# Patient Record
Sex: MALE | Race: WHITE | Employment: OTHER | URBAN - NONMETROPOLITAN AREA
[De-identification: names, ages, dates, MRNs, and addresses within clinical notes are randomized per-mention and may not be internally consistent; named-entity substitution may affect disease eponyms.]

---

## 2017-02-01 RX ORDER — ALPRAZOLAM 0.25 MG/1
0.25 TABLET ORAL DAILY
Qty: 90 TABLET | Refills: 0 | Status: SHIPPED | OUTPATIENT
Start: 2017-02-01 | End: 2017-05-25 | Stop reason: SDUPTHER

## 2017-03-07 ENCOUNTER — PROCEDURE VISIT (OUTPATIENT)
Dept: UROLOGY | Age: 72
End: 2017-03-07

## 2017-03-07 VITALS — BODY MASS INDEX: 31.42 KG/M2 | HEIGHT: 72 IN | WEIGHT: 232 LBS

## 2017-03-07 DIAGNOSIS — R35.0 FREQUENCY OF URINATION: ICD-10-CM

## 2017-03-07 DIAGNOSIS — N40.1 BENIGN PROSTATIC HYPERPLASIA WITH LOWER URINARY TRACT SYMPTOMS, UNSPECIFIED MORPHOLOGY: ICD-10-CM

## 2017-03-07 DIAGNOSIS — C67.0 MALIGNANT NEOPLASM OF TRIGONE OF URINARY BLADDER (HCC): ICD-10-CM

## 2017-03-07 DIAGNOSIS — C67.4 MALIGNANT NEOPLASM OF POSTERIOR WALL OF URINARY BLADDER (HCC): Primary | ICD-10-CM

## 2017-03-07 LAB
BILIRUBIN URINE: NEGATIVE
BLOOD URINE, POC: NORMAL
CHARACTER, URINE: CLEAR
COLOR, URINE: YELLOW
GLUCOSE URINE: NEGATIVE MG/DL
KETONES, URINE: NEGATIVE
LEUKOCYTE CLUMPS, URINE: NEGATIVE
NITRITE, URINE: NEGATIVE
PH, URINE: 6.5
PROTEIN, URINE: NEGATIVE MG/DL
SPECIFIC GRAVITY, URINE: 1.01 (ref 1–1.03)
UROBILINOGEN, URINE: 0.2 EU/DL

## 2017-03-07 PROCEDURE — 51741 ELECTRO-UROFLOWMETRY FIRST: CPT | Performed by: UROLOGY

## 2017-03-07 PROCEDURE — 99999 PR OFFICE/OUTPT VISIT,PROCEDURE ONLY: CPT | Performed by: UROLOGY

## 2017-03-07 PROCEDURE — 81003 URINALYSIS AUTO W/O SCOPE: CPT | Performed by: UROLOGY

## 2017-03-07 PROCEDURE — 52000 CYSTOURETHROSCOPY: CPT | Performed by: UROLOGY

## 2017-03-07 PROCEDURE — 51798 US URINE CAPACITY MEASURE: CPT | Performed by: UROLOGY

## 2017-03-07 ASSESSMENT — ENCOUNTER SYMPTOMS
EYE PAIN: 0
EYE ITCHING: 0
BACK PAIN: 0
VOMITING: 0
FACIAL SWELLING: 0
CONSTIPATION: 0
COLOR CHANGE: 0
SHORTNESS OF BREATH: 0
CHEST TIGHTNESS: 0

## 2017-03-09 ENCOUNTER — OFFICE VISIT (OUTPATIENT)
Dept: FAMILY MEDICINE CLINIC | Age: 72
End: 2017-03-09

## 2017-03-09 VITALS
DIASTOLIC BLOOD PRESSURE: 66 MMHG | HEART RATE: 60 BPM | WEIGHT: 240 LBS | BODY MASS INDEX: 32.55 KG/M2 | SYSTOLIC BLOOD PRESSURE: 100 MMHG | RESPIRATION RATE: 20 BRPM | TEMPERATURE: 97.9 F

## 2017-03-09 DIAGNOSIS — M77.8 SHOULDER TENDONITIS, LEFT: Primary | ICD-10-CM

## 2017-03-09 DIAGNOSIS — I10 ESSENTIAL HYPERTENSION, BENIGN: ICD-10-CM

## 2017-03-09 PROCEDURE — 96372 THER/PROPH/DIAG INJ SC/IM: CPT | Performed by: FAMILY MEDICINE

## 2017-03-09 PROCEDURE — 99213 OFFICE O/P EST LOW 20 MIN: CPT | Performed by: FAMILY MEDICINE

## 2017-03-09 RX ORDER — METHYLPREDNISOLONE ACETATE 80 MG/ML
160 INJECTION, SUSPENSION INTRA-ARTICULAR; INTRALESIONAL; INTRAMUSCULAR; SOFT TISSUE ONCE
Status: COMPLETED | OUTPATIENT
Start: 2017-03-09 | End: 2017-03-09

## 2017-03-09 RX ORDER — LISINOPRIL 10 MG/1
10 TABLET ORAL DAILY
Qty: 90 TABLET | Refills: 3 | Status: SHIPPED | OUTPATIENT
Start: 2017-03-09 | End: 2018-03-15 | Stop reason: SDUPTHER

## 2017-03-09 RX ORDER — HYOSCYAMINE SULFATE 0.125 MG
0.12 TABLET,DISINTEGRATING ORAL EVERY 4 HOURS PRN
Qty: 360 TABLET | Refills: 3 | Status: SHIPPED | OUTPATIENT
Start: 2017-03-09 | End: 2017-06-13 | Stop reason: SDUPTHER

## 2017-03-09 RX ADMIN — METHYLPREDNISOLONE ACETATE 160 MG: 80 INJECTION, SUSPENSION INTRA-ARTICULAR; INTRALESIONAL; INTRAMUSCULAR; SOFT TISSUE at 14:50

## 2017-03-09 ASSESSMENT — ENCOUNTER SYMPTOMS
DIARRHEA: 0
RHINORRHEA: 0
SHORTNESS OF BREATH: 0
NAUSEA: 0
COUGH: 0
SINUS PRESSURE: 0
CONSTIPATION: 0
ABDOMINAL PAIN: 0
ABDOMINAL DISTENTION: 0
EYE PAIN: 0
SORE THROAT: 0

## 2017-05-26 RX ORDER — ALPRAZOLAM 0.25 MG/1
0.25 TABLET ORAL DAILY
Qty: 30 TABLET | Refills: 0 | Status: SHIPPED | OUTPATIENT
Start: 2017-05-26 | End: 2017-08-02 | Stop reason: SDUPTHER

## 2017-06-13 ENCOUNTER — OFFICE VISIT (OUTPATIENT)
Dept: FAMILY MEDICINE CLINIC | Age: 72
End: 2017-06-13

## 2017-06-13 VITALS
RESPIRATION RATE: 16 BRPM | TEMPERATURE: 97.9 F | HEART RATE: 56 BPM | BODY MASS INDEX: 32.96 KG/M2 | SYSTOLIC BLOOD PRESSURE: 104 MMHG | WEIGHT: 243 LBS | DIASTOLIC BLOOD PRESSURE: 64 MMHG

## 2017-06-13 DIAGNOSIS — R35.1 NOCTURIA: ICD-10-CM

## 2017-06-13 DIAGNOSIS — M75.52 SHOULDER BURSITIS, LEFT: Primary | ICD-10-CM

## 2017-06-13 DIAGNOSIS — R04.2 HEMOPTYSIS: ICD-10-CM

## 2017-06-13 DIAGNOSIS — L82.1 SEBORRHEIC KERATOSES: ICD-10-CM

## 2017-06-13 DIAGNOSIS — L30.9 DERMATITIS: ICD-10-CM

## 2017-06-13 PROCEDURE — 96372 THER/PROPH/DIAG INJ SC/IM: CPT | Performed by: FAMILY MEDICINE

## 2017-06-13 PROCEDURE — 99214 OFFICE O/P EST MOD 30 MIN: CPT | Performed by: FAMILY MEDICINE

## 2017-06-13 RX ORDER — HYOSCYAMINE SULFATE 0.125 MG
0.12 TABLET,DISINTEGRATING ORAL EVERY 4 HOURS PRN
Qty: 360 TABLET | Refills: 3 | Status: SHIPPED | OUTPATIENT
Start: 2017-06-13 | End: 2018-12-20 | Stop reason: SDUPTHER

## 2017-06-13 RX ORDER — GLUCOSAMINE HCL/CHONDROITIN SU 500-400 MG
CAPSULE ORAL
Qty: 50 STRIP | Refills: 3 | Status: SHIPPED | OUTPATIENT
Start: 2017-06-13 | End: 2020-10-05 | Stop reason: SDUPTHER

## 2017-06-13 RX ORDER — METHYLPREDNISOLONE ACETATE 80 MG/ML
160 INJECTION, SUSPENSION INTRA-ARTICULAR; INTRALESIONAL; INTRAMUSCULAR; SOFT TISSUE ONCE
Status: COMPLETED | OUTPATIENT
Start: 2017-06-13 | End: 2017-06-13

## 2017-06-13 RX ORDER — GLUCOSAMINE HCL/CHONDROITIN SU 500-400 MG
CAPSULE ORAL
Qty: 100 STRIP | Refills: 3 | Status: CANCELLED | OUTPATIENT
Start: 2017-06-13

## 2017-06-13 RX ORDER — NITROGLYCERIN 0.4 MG/1
0.4 TABLET SUBLINGUAL EVERY 5 MIN PRN
Qty: 25 TABLET | Refills: 5 | Status: SHIPPED | OUTPATIENT
Start: 2017-06-13

## 2017-06-13 RX ADMIN — METHYLPREDNISOLONE ACETATE 160 MG: 80 INJECTION, SUSPENSION INTRA-ARTICULAR; INTRALESIONAL; INTRAMUSCULAR; SOFT TISSUE at 14:34

## 2017-06-16 LAB — PSA, ULTRASENSITIVE: 0.84 NG/ML

## 2017-06-16 ASSESSMENT — ENCOUNTER SYMPTOMS
CONSTIPATION: 0
NAUSEA: 0
ABDOMINAL PAIN: 0
COUGH: 0
SORE THROAT: 0
RHINORRHEA: 0
SINUS PRESSURE: 0
DIARRHEA: 0
SHORTNESS OF BREATH: 0
EYE PAIN: 0
ABDOMINAL DISTENTION: 0

## 2017-07-18 LAB
CHOLESTEROL, TOTAL: 139 MG/DL
CHOLESTEROL/HDL RATIO: NORMAL
HDLC SERPL-MCNC: 65 MG/DL (ref 35–70)
LDL CHOLESTEROL CALCULATED: 58 MG/DL (ref 0–160)
TRIGL SERPL-MCNC: 82 MG/DL
VLDLC SERPL CALC-MCNC: NORMAL MG/DL

## 2017-08-02 RX ORDER — ALPRAZOLAM 0.25 MG/1
0.25 TABLET ORAL DAILY
Qty: 30 TABLET | Refills: 0 | Status: SHIPPED | OUTPATIENT
Start: 2017-08-02 | End: 2017-09-14 | Stop reason: SDUPTHER

## 2017-08-03 ENCOUNTER — HOSPITAL ENCOUNTER (OUTPATIENT)
Dept: GENERAL RADIOLOGY | Age: 72
Discharge: HOME | End: 2017-08-03
Payer: MEDICARE

## 2017-08-03 ENCOUNTER — HOSPITAL ENCOUNTER (OUTPATIENT)
Age: 72
Discharge: HOME | End: 2017-08-03
Payer: MEDICARE

## 2017-08-03 DIAGNOSIS — I20.9 ANGINAL PAIN (HCC): ICD-10-CM

## 2017-08-03 LAB
BASOPHILS # BLD: 0.6 %
BASOPHILS ABSOLUTE: 0 THOU/MM3 (ref 0–0.1)
EOSINOPHIL # BLD: 1.5 %
EOSINOPHILS ABSOLUTE: 0.1 THOU/MM3 (ref 0–0.4)
HCT VFR BLD CALC: 39.5 % (ref 42–52)
HEMOGLOBIN: 13.9 GM/DL (ref 14–18)
LYMPHOCYTES # BLD: 20.9 %
LYMPHOCYTES ABSOLUTE: 1.6 THOU/MM3 (ref 1–4.8)
MCH RBC QN AUTO: 32.1 PG (ref 27–31)
MCHC RBC AUTO-ENTMCNC: 35.3 GM/DL (ref 33–37)
MCV RBC AUTO: 91.2 FL (ref 80–94)
MONOCYTES # BLD: 9.1 %
MONOCYTES ABSOLUTE: 0.7 THOU/MM3 (ref 0.4–1.3)
NUCLEATED RED BLOOD CELLS: 0 /100 WBC
PDW BLD-RTO: 13.7 % (ref 11.5–14.5)
PLATELET # BLD: 188 THOU/MM3 (ref 130–400)
PMV BLD AUTO: 9.1 MCM (ref 7.4–10.4)
RBC # BLD: 4.33 MILL/MM3 (ref 4.7–6.1)
RBC # BLD: NORMAL 10*6/UL
SEG NEUTROPHILS: 67.9 %
SEGMENTED NEUTROPHILS ABSOLUTE COUNT: 5.3 THOU/MM3 (ref 1.8–7.7)
TSH SERPL DL<=0.05 MIU/L-ACNC: 0.81 UIU/ML (ref 0.4–4.2)
WBC # BLD: 7.8 THOU/MM3 (ref 4.8–10.8)

## 2017-08-03 PROCEDURE — 36415 COLL VENOUS BLD VENIPUNCTURE: CPT

## 2017-08-03 PROCEDURE — 85025 COMPLETE CBC W/AUTO DIFF WBC: CPT

## 2017-08-03 PROCEDURE — 71020 XR CHEST STANDARD TWO VW: CPT

## 2017-08-03 PROCEDURE — 84443 ASSAY THYROID STIM HORMONE: CPT

## 2017-09-07 ENCOUNTER — PROCEDURE VISIT (OUTPATIENT)
Dept: UROLOGY | Age: 72
End: 2017-09-07
Payer: MEDICARE

## 2017-09-07 VITALS
DIASTOLIC BLOOD PRESSURE: 82 MMHG | BODY MASS INDEX: 31.69 KG/M2 | HEIGHT: 72 IN | SYSTOLIC BLOOD PRESSURE: 132 MMHG | WEIGHT: 234 LBS

## 2017-09-07 DIAGNOSIS — C67.0 MALIGNANT NEOPLASM OF TRIGONE OF URINARY BLADDER (HCC): ICD-10-CM

## 2017-09-07 DIAGNOSIS — R39.14 BENIGN PROSTATIC HYPERTROPHY (BPH) WITH INCOMPLETE BLADDER EMPTYING: Primary | ICD-10-CM

## 2017-09-07 DIAGNOSIS — N40.1 BENIGN PROSTATIC HYPERTROPHY (BPH) WITH INCOMPLETE BLADDER EMPTYING: Primary | ICD-10-CM

## 2017-09-07 LAB
BILIRUBIN URINE: NEGATIVE
BLOOD URINE, POC: NORMAL
CHARACTER, URINE: CLEAR
COLOR, URINE: YELLOW
GLUCOSE URINE: NEGATIVE MG/DL
KETONES, URINE: NEGATIVE
LEUKOCYTE CLUMPS, URINE: NORMAL
NITRITE, URINE: NEGATIVE
PH, URINE: 5.5
POST VOID RESIDUAL (PVR): 110 ML
PROTEIN, URINE: NEGATIVE MG/DL
SPECIFIC GRAVITY, URINE: 1.01 (ref 1–1.03)
UROBILINOGEN, URINE: 0.2 EU/DL

## 2017-09-07 PROCEDURE — 52000 CYSTOURETHROSCOPY: CPT | Performed by: UROLOGY

## 2017-09-07 PROCEDURE — 99999 PR OFFICE/OUTPT VISIT,PROCEDURE ONLY: CPT | Performed by: UROLOGY

## 2017-09-07 PROCEDURE — 81003 URINALYSIS AUTO W/O SCOPE: CPT | Performed by: UROLOGY

## 2017-09-07 PROCEDURE — 51798 US URINE CAPACITY MEASURE: CPT | Performed by: UROLOGY

## 2017-09-14 ENCOUNTER — OFFICE VISIT (OUTPATIENT)
Dept: FAMILY MEDICINE CLINIC | Age: 72
End: 2017-09-14
Payer: MEDICARE

## 2017-09-14 VITALS
HEIGHT: 70 IN | WEIGHT: 242 LBS | DIASTOLIC BLOOD PRESSURE: 60 MMHG | RESPIRATION RATE: 16 BRPM | HEART RATE: 72 BPM | TEMPERATURE: 97.7 F | BODY MASS INDEX: 34.65 KG/M2 | SYSTOLIC BLOOD PRESSURE: 106 MMHG

## 2017-09-14 DIAGNOSIS — Z85.51 HISTORY OF BLADDER CANCER: ICD-10-CM

## 2017-09-14 DIAGNOSIS — I10 ESSENTIAL HYPERTENSION, BENIGN: ICD-10-CM

## 2017-09-14 DIAGNOSIS — R73.9 HYPERGLYCEMIA: ICD-10-CM

## 2017-09-14 DIAGNOSIS — I25.10 CORONARY ARTERY DISEASE INVOLVING NATIVE HEART WITHOUT ANGINA PECTORIS, UNSPECIFIED VESSEL OR LESION TYPE: Primary | ICD-10-CM

## 2017-09-14 PROCEDURE — G0008 ADMIN INFLUENZA VIRUS VAC: HCPCS | Performed by: FAMILY MEDICINE

## 2017-09-14 PROCEDURE — 99213 OFFICE O/P EST LOW 20 MIN: CPT | Performed by: FAMILY MEDICINE

## 2017-09-14 PROCEDURE — 90662 IIV NO PRSV INCREASED AG IM: CPT | Performed by: FAMILY MEDICINE

## 2017-09-14 RX ORDER — ATORVASTATIN CALCIUM 40 MG/1
40 TABLET, FILM COATED ORAL NIGHTLY
Qty: 90 TABLET | Refills: 3 | Status: SHIPPED | OUTPATIENT
Start: 2017-09-14 | End: 2018-09-27 | Stop reason: SDUPTHER

## 2017-09-14 RX ORDER — ALPRAZOLAM 0.25 MG/1
0.25 TABLET ORAL DAILY
Qty: 30 TABLET | Refills: 2 | Status: SHIPPED | OUTPATIENT
Start: 2017-09-14 | End: 2017-12-14 | Stop reason: SDUPTHER

## 2017-09-14 RX ORDER — DICYCLOMINE HCL 20 MG
20 TABLET ORAL 2 TIMES DAILY
Qty: 180 TABLET | Refills: 3 | Status: SHIPPED | OUTPATIENT
Start: 2017-09-14 | End: 2018-09-27 | Stop reason: SDUPTHER

## 2017-09-14 RX ORDER — METOPROLOL TARTRATE 50 MG/1
25 TABLET, FILM COATED ORAL NIGHTLY
COMMUNITY
End: 2018-09-27 | Stop reason: DRUGHIGH

## 2017-09-17 ASSESSMENT — ENCOUNTER SYMPTOMS
DIARRHEA: 0
NAUSEA: 0
ABDOMINAL DISTENTION: 0
SINUS PRESSURE: 0
ABDOMINAL PAIN: 0
COUGH: 0
RHINORRHEA: 0
EYE PAIN: 0
SHORTNESS OF BREATH: 0
CONSTIPATION: 0
SORE THROAT: 0

## 2017-11-15 ENCOUNTER — OFFICE VISIT (OUTPATIENT)
Dept: PULMONOLOGY | Age: 72
End: 2017-11-15
Payer: MEDICARE

## 2017-11-15 VITALS
HEART RATE: 62 BPM | DIASTOLIC BLOOD PRESSURE: 66 MMHG | HEIGHT: 70 IN | BODY MASS INDEX: 35.05 KG/M2 | OXYGEN SATURATION: 92 % | SYSTOLIC BLOOD PRESSURE: 104 MMHG | WEIGHT: 244.8 LBS

## 2017-11-15 DIAGNOSIS — Z99.89 OBSTRUCTIVE SLEEP APNEA ON CPAP: Primary | ICD-10-CM

## 2017-11-15 DIAGNOSIS — G47.33 OBSTRUCTIVE SLEEP APNEA ON CPAP: Primary | ICD-10-CM

## 2017-11-15 PROCEDURE — G8484 FLU IMMUNIZE NO ADMIN: HCPCS | Performed by: PHYSICIAN ASSISTANT

## 2017-11-15 PROCEDURE — G8598 ASA/ANTIPLAT THER USED: HCPCS | Performed by: PHYSICIAN ASSISTANT

## 2017-11-15 PROCEDURE — G8417 CALC BMI ABV UP PARAM F/U: HCPCS | Performed by: PHYSICIAN ASSISTANT

## 2017-11-15 PROCEDURE — 1036F TOBACCO NON-USER: CPT | Performed by: PHYSICIAN ASSISTANT

## 2017-11-15 PROCEDURE — 1123F ACP DISCUSS/DSCN MKR DOCD: CPT | Performed by: PHYSICIAN ASSISTANT

## 2017-11-15 PROCEDURE — 3017F COLORECTAL CA SCREEN DOC REV: CPT | Performed by: PHYSICIAN ASSISTANT

## 2017-11-15 PROCEDURE — 4040F PNEUMOC VAC/ADMIN/RCVD: CPT | Performed by: PHYSICIAN ASSISTANT

## 2017-11-15 PROCEDURE — G8427 DOCREV CUR MEDS BY ELIG CLIN: HCPCS | Performed by: PHYSICIAN ASSISTANT

## 2017-11-15 PROCEDURE — 99213 OFFICE O/P EST LOW 20 MIN: CPT | Performed by: PHYSICIAN ASSISTANT

## 2017-11-15 NOTE — PROGRESS NOTES
[facility]      Nimco Lopez                                         [id number]  11/15/2017   Chief Complaint   Patient presents with    Sleep Apnea     16 month f/u with download [facility], unable to get a reading off pap machine        Pt of Dr. Mayo Fox    PAP Download:   Audery Crimes or initial  AHI: 13.4     Date of initial study: 9/12/05     Machine/Mfg: F and P Interface: nasal    Provider:  [x][facility]  []Akira  []Maile  []Bibiana         [][facility] []Other:   Neck Size: 16.5  Mallampati 4    ESS:  3    Here is a scan of the most recent download:  Machine will not download    Presentation:   Sydney Still presents for sleep medicine follow up for obstructive sleep apnea  Since the last visit, Sydney Still is doing reasonably well with their sleep machine. Other comments: He is doing well with PAP but hates it. Equipment issues: The pressure is  acceptable, the mask is acceptable and he  is  using the humidity. Sleep issues:  Do you feel better? No  More rested? No   Better concentration? no    Progress History:   Since last visit any new medical issues? No  New ER or hospitlal visits? No  Any new or changes in medicines? No  Any new sleep medicines? No        Past Medical History:   Diagnosis Date    Arthritis     CAD (coronary artery disease)     GERD (gastroesophageal reflux disease)     Hyperglycemia 3/18/2016    Hyperlipidemia     Hypertension     IBS (irritable bowel syndrome)     Kidney stones     Malignant neoplasm of trigone of urinary bladder (HCC)     Obstructive sleep apnea     wears cpap    Prostatitis        Past Surgical History:   Procedure Laterality Date    ABDOMEN SURGERY      BACK SURGERY  07/08/2013    Lumbar Coyle L2-S-1, revision, PLIF L5-S-1 w/ grafting    CARDIAC CATHETERIZATION  9/2007    COLON SURGERY  2008    Dr. Reagan Moran  2011?     Dr. April Ayala  2007    DILATATION, ESOPHAGUS      ENDOSCOPY, COLON, DIAGNOSTIC      HERNIA REPAIR  2005    Dr. Mei Valdes Right 3/25/2014    right total hip    OTHER SURGICAL HISTORY  08/25/2016    TURBT by Dr Saqib Figueroa       Social History   Substance Use Topics    Smoking status: Former Smoker     Packs/day: 1.00     Years: 25.00     Quit date: 1/1/1995    Smokeless tobacco: Former User    Alcohol use 0.0 oz/week      Comment: occas       Allergies   Allergen Reactions    Avodart [Dutasteride] Other (See Comments)     Chest tightness       Current Outpatient Prescriptions   Medication Sig Dispense Refill    metoprolol tartrate (LOPRESSOR) 50 MG tablet Take 25 mg by mouth nightly      ALPRAZolam (XANAX) 0.25 MG tablet Take 1 tablet by mouth Daily 30 tablet 2    dicyclomine (BENTYL) 20 MG tablet Take 1 tablet by mouth 2 times daily 180 tablet 3    atorvastatin (LIPITOR) 40 MG tablet Take 1 tablet by mouth nightly 90 tablet 3    hyoscyamine (NULEV) 125 MCG TBDP dispersible tablet Take 1 tablet by mouth every 4 hours as needed (abd pain) 360 tablet 3    nitroGLYCERIN (NITROSTAT) 0.4 MG SL tablet Place 1 tablet under the tongue every 5 minutes as needed for Chest pain 25 tablet 5    Glucose Blood (BLOOD GLUCOSE TEST STRIPS) STRP [employer] True Metrix Test strips Test BS's every two weeks as directed Dx: R73.9 50 strip 3    lisinopril (PRINIVIL;ZESTRIL) 10 MG tablet Take 1 tablet by mouth daily 90 tablet 3    tamsulosin (FLOMAX) 0.4 MG capsule Take 1 capsule by mouth nightly 90 capsule 3    Cinnamon 500 MG CAPS Take 500 mg by mouth Daily      Milk Thistle 1000 MG CAPS Take 1,000 mg by mouth Daily      Blood Glucose Monitoring Suppl KIT [employer] True Metrix Glucometer Dx: R73.9 1 kit 0    Lancets MISC TruePlus Super Thin 28G lancets Test BS's every 2 weeks as directed Dx: R73.9 50 each 11    Saw Palmetto 500 MG CAPS Take 1 tablet by mouth 2 times daily.       Ubiquinol 100 MG CAPS Take 1 tablet by mouth daily.      Bilberry 100 MG CAPS Take 100 mg by mouth Daily       fish oil-omega-3 fatty acids 1000 MG capsule Take 1 g by mouth daily.  aspirin 81 MG EC tablet Take 81 mg by mouth daily.  Chromium Picolinate 500 MCG TABS Take 500 mg by mouth daily.  Vitamin E 400 UNIT TABS Take 400 mg by mouth daily.  Ascorbic Acid (VITAMIN C CR) 500 MG TBCR Take 500 mg by mouth daily.  Methylsulfonylmethane (MSM) 1500 MG TABS Take 1,500 mg by mouth daily.  Lycopene 10 MG CAPS Take 20 mg by mouth daily.  Multiple Vitamin (MULTIVITAMIN PO) Take 1 tablet by mouth daily.  Cholecalciferol (VITAMIN D3) 2000 UNIT TABS Take 2,000 mg by mouth daily.  Pyridoxine HCl (VITAMIN B-6) 50 MG tablet Take 50 mg by mouth 2 times daily.  Vitamins-Lipotropics (BALANCED B-50 COMPLEX PO) Take  by mouth 2 times daily. No current facility-administered medications for this visit. Family History   Problem Relation Age of Onset    Heart Disease Mother     Diabetes Mother     Kidney Disease Father     Cancer Brother      colon        Review of Systems -   General ROS: gained 8 lbs over 2 months  ENT ROS: negative for - nasal congestion, oral lesions or sore throat  Hematological and Lymphatic ROS: negative  Endocrine ROS: negative  Respiratory ROS: no cough, shortness of breath, or wheezing  Cardiovascular ROS: no chest pain   Gastrointestinal ROS: no abdominal pain, change in bowel habits, or black or bloody stools  Musculoskeletal ROS: negative  Neurological ROS: negative    Physical Exam:    BMI:  Body mass index is 35.13 kg/m².     Wt Readings from Last 3 Encounters:   11/15/17 244 lb 12.8 oz (111 kg)   09/14/17 242 lb (109.8 kg)   09/07/17 234 lb (106.1 kg)     Vitals: /66   Pulse 62   Ht 5' 10\" (1.778 m)   Wt 244 lb 12.8 oz (111 kg)   SpO2 92% Comment: room air at rest  BMI 35.13 kg/m²       General appearance: alert and oriented to person, place and time, well-developed and well-nourished, in no acute distress  Nose: Nares normal. Septum midline. Mucosa normal. No drainage or sinus tenderness. Oropharynx:  negative  Lungs: clear to auscultation bilaterally  Heart: regular rate and rhythm, S1, S2 normal, no murmur, click, rub or gallop  Extremities: extremities normal, atraumatic, no cyanosis or edema  Neurologic: Mental status: Alert, oriented, thought content appropriate      ASSESSMENT/DIAGNOSIS    1. Obstructive sleep apnea on CPAP  DME Order for CPAP as OP            Plan   Do you need any equipment today? Yes he needs a replacement machine but does not want one  - his machine is not downloading but refuses new machine  - He  was advised to continue current positive airway pressure therapy with above described pressure. - He  advised to keep good compliance with current recommended pressure to get optimal results and clinical improvement  - Recommend 7-9 hours of sleep with PAP  - He was advised to call DME company regarding supplies if needed. - He call my office for earlier appointment if needed for worsening of sleep symptoms.   - He was instructed on weight loss  - Or[place] was educated about my impression and plan. Patient verbalizes understanding.   We will see Sherri Martinez back in: 1 year with download    Selina Rivera PA-C, MPAS  11/15/2017

## 2017-11-24 DIAGNOSIS — N40.1 BPH WITH OBSTRUCTION/LOWER URINARY TRACT SYMPTOMS: ICD-10-CM

## 2017-11-24 DIAGNOSIS — N13.8 BPH WITH OBSTRUCTION/LOWER URINARY TRACT SYMPTOMS: ICD-10-CM

## 2017-11-24 NOTE — TELEPHONE ENCOUNTER
Coleen Cough called requesting a refill on the following medications:  Requested Prescriptions     Pending Prescriptions Disp Refills    tamsulosin (FLOMAX) 0.4 MG capsule 90 capsule 3     Sig: Take 1 capsule by mouth nightly     Pharmacy verified: [facility] [address]      Date of last visit: 9/14/17  Date of next visit (if applicable): 03/88/7276

## 2017-11-27 RX ORDER — TAMSULOSIN HYDROCHLORIDE 0.4 MG/1
0.4 CAPSULE ORAL NIGHTLY
Qty: 90 CAPSULE | Refills: 3 | Status: SHIPPED | OUTPATIENT
Start: 2017-11-27 | End: 2017-12-14 | Stop reason: DRUGHIGH

## 2017-12-07 LAB
ABSOLUTE BASO #: 0.1 K/UL (ref 0–0.1)
ABSOLUTE EOS #: 0.3 K/UL (ref 0.1–0.4)
ABSOLUTE LYMPH #: 2.6 K/UL (ref 0.8–5.2)
ABSOLUTE MONO #: 0.7 K/UL (ref 0.1–0.9)
ABSOLUTE NEUT #: 3.8 K/UL (ref 1.3–9.1)
ALBUMIN SERPL-MCNC: 4.3 G/DL (ref 3.2–5.3)
ALK PHOSPHATASE: 80 IU/L (ref 35–121)
ALT SERPL-CCNC: 22 IU/L (ref 5–59)
ANION GAP SERPL CALCULATED.3IONS-SCNC: 11 MMOL/L
AST SERPL-CCNC: 26 IU/L (ref 10–42)
AVERAGE GLUCOSE: 114 MG/DL (ref 66–114)
BASOPHILS RELATIVE PERCENT: 0.8 %
BILIRUB SERPL-MCNC: 1.4 MG/DL (ref 0.2–1.3)
BUN BLDV-MCNC: 19 MG/DL (ref 10–25)
CALCIUM SERPL-MCNC: 9.1 MG/DL (ref 8.7–10.8)
CHLORIDE BLD-SCNC: 106 MMOL/L (ref 95–111)
CO2: 27 MMOL/L (ref 21–32)
CREAT SERPL-MCNC: 1 MG/DL (ref 0.7–1.5)
EGFR AFRICAN AMERICAN: 89
EGFR IF NONAFRICAN AMERICAN: 73
EOSINOPHILS RELATIVE PERCENT: 3.9 %
GLUCOSE: 110 MG/DL (ref 70–100)
HBA1C MFR BLD: 5.6 % (ref 4.2–5.8)
HCT VFR BLD CALC: 43.2 % (ref 41.4–51)
HEMOGLOBIN: 14.7 G/DL (ref 13.8–17)
LYMPHOCYTE %: 34.9 %
MCH RBC QN AUTO: 30.5 PG (ref 27–34)
MCHC RBC AUTO-ENTMCNC: 34 G/DL (ref 31–36)
MCV RBC AUTO: 89.6 FL (ref 80–100)
MONOCYTES # BLD: 9.4 %
NEUTROPHILS RELATIVE PERCENT: 50.5 %
PDW BLD-RTO: 11.8 % (ref 10.8–14.8)
PLATELETS: 206 K/UL (ref 150–450)
POTASSIUM SERPL-SCNC: 4.3 MMOL/L (ref 3.5–5.4)
RBC: 4.82 M/UL (ref 4–5.5)
SODIUM BLD-SCNC: 140 MMOL/L (ref 134–147)
TOTAL PROTEIN: 6.4 G/DL (ref 5.8–8)
WBC: 7.5 K/UL (ref 3.7–10.8)

## 2017-12-14 ENCOUNTER — OFFICE VISIT (OUTPATIENT)
Dept: FAMILY MEDICINE CLINIC | Age: 72
End: 2017-12-14
Payer: MEDICARE

## 2017-12-14 VITALS
DIASTOLIC BLOOD PRESSURE: 62 MMHG | WEIGHT: 244 LBS | RESPIRATION RATE: 16 BRPM | TEMPERATURE: 98.3 F | SYSTOLIC BLOOD PRESSURE: 118 MMHG | HEART RATE: 72 BPM | BODY MASS INDEX: 35.01 KG/M2

## 2017-12-14 DIAGNOSIS — N40.1 BENIGN PROSTATIC HYPERPLASIA WITH INCOMPLETE BLADDER EMPTYING: ICD-10-CM

## 2017-12-14 DIAGNOSIS — N40.1 BPH WITH OBSTRUCTION/LOWER URINARY TRACT SYMPTOMS: ICD-10-CM

## 2017-12-14 DIAGNOSIS — I10 ESSENTIAL HYPERTENSION, BENIGN: ICD-10-CM

## 2017-12-14 DIAGNOSIS — R39.14 BENIGN PROSTATIC HYPERPLASIA WITH INCOMPLETE BLADDER EMPTYING: ICD-10-CM

## 2017-12-14 DIAGNOSIS — E78.5 HYPERLIPIDEMIA, UNSPECIFIED HYPERLIPIDEMIA TYPE: ICD-10-CM

## 2017-12-14 DIAGNOSIS — R73.9 HYPERGLYCEMIA: Primary | Chronic | ICD-10-CM

## 2017-12-14 DIAGNOSIS — N13.8 BPH WITH OBSTRUCTION/LOWER URINARY TRACT SYMPTOMS: ICD-10-CM

## 2017-12-14 PROCEDURE — G8417 CALC BMI ABV UP PARAM F/U: HCPCS | Performed by: FAMILY MEDICINE

## 2017-12-14 PROCEDURE — 99213 OFFICE O/P EST LOW 20 MIN: CPT | Performed by: FAMILY MEDICINE

## 2017-12-14 PROCEDURE — 4040F PNEUMOC VAC/ADMIN/RCVD: CPT | Performed by: FAMILY MEDICINE

## 2017-12-14 PROCEDURE — 1036F TOBACCO NON-USER: CPT | Performed by: FAMILY MEDICINE

## 2017-12-14 PROCEDURE — G8484 FLU IMMUNIZE NO ADMIN: HCPCS | Performed by: FAMILY MEDICINE

## 2017-12-14 PROCEDURE — 3017F COLORECTAL CA SCREEN DOC REV: CPT | Performed by: FAMILY MEDICINE

## 2017-12-14 PROCEDURE — G8427 DOCREV CUR MEDS BY ELIG CLIN: HCPCS | Performed by: FAMILY MEDICINE

## 2017-12-14 PROCEDURE — 1123F ACP DISCUSS/DSCN MKR DOCD: CPT | Performed by: FAMILY MEDICINE

## 2017-12-14 PROCEDURE — G8598 ASA/ANTIPLAT THER USED: HCPCS | Performed by: FAMILY MEDICINE

## 2017-12-14 RX ORDER — ALPRAZOLAM 0.25 MG/1
0.25 TABLET ORAL DAILY
Qty: 30 TABLET | Refills: 2 | Status: SHIPPED | OUTPATIENT
Start: 2017-12-14 | End: 2018-03-15 | Stop reason: SDUPTHER

## 2017-12-14 RX ORDER — BLOOD-GLUCOSE METER
1 EACH MISCELLANEOUS WEEKLY
Qty: 1 EACH | Refills: 1 | Status: SHIPPED | OUTPATIENT
Start: 2017-12-14 | End: 2018-12-20

## 2017-12-14 RX ORDER — TAMSULOSIN HYDROCHLORIDE 0.4 MG/1
0.8 CAPSULE ORAL NIGHTLY
Qty: 90 CAPSULE | Refills: 3 | Status: SHIPPED
Start: 2017-12-14 | End: 2017-12-29 | Stop reason: SDUPTHER

## 2017-12-14 RX ORDER — OMEPRAZOLE 20 MG/1
20 CAPSULE, DELAYED RELEASE ORAL DAILY
COMMUNITY
End: 2019-03-21 | Stop reason: SDUPTHER

## 2017-12-14 ASSESSMENT — ENCOUNTER SYMPTOMS
DIARRHEA: 0
SINUS PRESSURE: 0
COUGH: 0
RHINORRHEA: 0
ABDOMINAL DISTENTION: 0
NAUSEA: 0
CONSTIPATION: 0
SORE THROAT: 0
EYE PAIN: 0
ABDOMINAL PAIN: 0
SHORTNESS OF BREATH: 0

## 2017-12-14 ASSESSMENT — PATIENT HEALTH QUESTIONNAIRE - PHQ9
2. FEELING DOWN, DEPRESSED OR HOPELESS: 0
1. LITTLE INTEREST OR PLEASURE IN DOING THINGS: 0
SUM OF ALL RESPONSES TO PHQ QUESTIONS 1-9: 0
SUM OF ALL RESPONSES TO PHQ9 QUESTIONS 1 & 2: 0

## 2017-12-29 ENCOUNTER — TELEPHONE (OUTPATIENT)
Dept: FAMILY MEDICINE CLINIC | Age: 72
End: 2017-12-29

## 2017-12-29 DIAGNOSIS — N40.1 BPH WITH OBSTRUCTION/LOWER URINARY TRACT SYMPTOMS: ICD-10-CM

## 2017-12-29 DIAGNOSIS — N13.8 BPH WITH OBSTRUCTION/LOWER URINARY TRACT SYMPTOMS: ICD-10-CM

## 2017-12-29 RX ORDER — TAMSULOSIN HYDROCHLORIDE 0.4 MG/1
0.8 CAPSULE ORAL NIGHTLY
Qty: 180 CAPSULE | Refills: 3 | Status: SHIPPED | OUTPATIENT
Start: 2017-12-29 | End: 2018-12-20 | Stop reason: SDUPTHER

## 2018-03-15 ENCOUNTER — OFFICE VISIT (OUTPATIENT)
Dept: FAMILY MEDICINE CLINIC | Age: 73
End: 2018-03-15
Payer: MEDICARE

## 2018-03-15 VITALS
RESPIRATION RATE: 16 BRPM | HEART RATE: 64 BPM | DIASTOLIC BLOOD PRESSURE: 68 MMHG | TEMPERATURE: 97.6 F | SYSTOLIC BLOOD PRESSURE: 110 MMHG | BODY MASS INDEX: 35.3 KG/M2 | WEIGHT: 246 LBS

## 2018-03-15 DIAGNOSIS — R73.9 HYPERGLYCEMIA: Chronic | ICD-10-CM

## 2018-03-15 DIAGNOSIS — E78.5 HYPERLIPIDEMIA, UNSPECIFIED HYPERLIPIDEMIA TYPE: Primary | ICD-10-CM

## 2018-03-15 DIAGNOSIS — I10 ESSENTIAL HYPERTENSION, BENIGN: ICD-10-CM

## 2018-03-15 DIAGNOSIS — F41.9 ANXIETY: Chronic | ICD-10-CM

## 2018-03-15 PROCEDURE — 4040F PNEUMOC VAC/ADMIN/RCVD: CPT | Performed by: FAMILY MEDICINE

## 2018-03-15 PROCEDURE — 1036F TOBACCO NON-USER: CPT | Performed by: FAMILY MEDICINE

## 2018-03-15 PROCEDURE — G8482 FLU IMMUNIZE ORDER/ADMIN: HCPCS | Performed by: FAMILY MEDICINE

## 2018-03-15 PROCEDURE — 1123F ACP DISCUSS/DSCN MKR DOCD: CPT | Performed by: FAMILY MEDICINE

## 2018-03-15 PROCEDURE — 99213 OFFICE O/P EST LOW 20 MIN: CPT | Performed by: FAMILY MEDICINE

## 2018-03-15 PROCEDURE — G8427 DOCREV CUR MEDS BY ELIG CLIN: HCPCS | Performed by: FAMILY MEDICINE

## 2018-03-15 PROCEDURE — 3017F COLORECTAL CA SCREEN DOC REV: CPT | Performed by: FAMILY MEDICINE

## 2018-03-15 PROCEDURE — G8417 CALC BMI ABV UP PARAM F/U: HCPCS | Performed by: FAMILY MEDICINE

## 2018-03-15 RX ORDER — LISINOPRIL 10 MG/1
10 TABLET ORAL DAILY
Qty: 90 TABLET | Refills: 3 | Status: SHIPPED | OUTPATIENT
Start: 2018-03-15 | End: 2018-12-20 | Stop reason: SDUPTHER

## 2018-03-15 RX ORDER — ALPRAZOLAM 0.25 MG/1
0.25 TABLET ORAL DAILY
Qty: 30 TABLET | Refills: 2 | Status: SHIPPED | OUTPATIENT
Start: 2018-03-15 | End: 2018-04-14

## 2018-03-15 ASSESSMENT — ENCOUNTER SYMPTOMS
SINUS PRESSURE: 0
COUGH: 0
CONSTIPATION: 0
ABDOMINAL PAIN: 0
EYE PAIN: 0
NAUSEA: 0
RHINORRHEA: 0
DIARRHEA: 0
SORE THROAT: 0
ABDOMINAL DISTENTION: 0
SHORTNESS OF BREATH: 0

## 2018-03-15 NOTE — PROGRESS NOTES
Neck: No JVD present. Cardiovascular: Normal rate, regular rhythm and normal heart sounds. Pulmonary/Chest: Effort normal and breath sounds normal. He has no wheezes. He has no rales. Musculoskeletal: He exhibits no edema or tenderness. Neurological: He is alert and oriented to person, place, and time. Skin: Skin is warm and dry. He is not diaphoretic. No pallor. /68   Pulse 64   Temp 97.6 °F (36.4 °C)   Resp 16   Wt 246 lb (111.6 kg)   BMI 35.30 kg/m²       Impression/Plan:  1. Hyperlipidemia, unspecified hyperlipidemia type    2. Anxiety    3. Essential hypertension, benign    4. Hyperglycemia      Requested Prescriptions     Signed Prescriptions Disp Refills    ALPRAZolam (XANAX) 0.25 MG tablet 30 tablet 2     Sig: Take 1 tablet by mouth Daily for 30 days.  lisinopril (PRINIVIL;ZESTRIL) 10 MG tablet 90 tablet 3     Sig: Take 1 tablet by mouth daily     Orders Placed This Encounter   Procedures    Hemoglobin A1C     Standing Status:   Future     Standing Expiration Date:   3/15/2019    Comprehensive Metabolic Panel     Standing Status:   Future     Standing Expiration Date:   3/15/2019    Lipid Panel     Standing Status:   Future     Standing Expiration Date:   3/15/2019     Order Specific Question:   Is Patient Fasting?/# of Hours     Answer:   yes/12       Patient given educational materials - see patient instructions. Discussed use, benefit, and side effects of prescribed medications. All patient questions answered. Pt voiced understanding. Reviewed health maintenance. Patient agreed with treatment plan. Follow up as directed. **This report has been created using voice recognition software. It may contain minor errors which are inherent in voice recognition technology. **       Electronically signed by Miriam Taylor MD on 3/15/2018 at 7:15 PM

## 2018-06-16 LAB
ALBUMIN SERPL-MCNC: 4.6 G/DL (ref 3.5–5.2)
ALK PHOSPHATASE: 81 U/L (ref 39–118)
ALT SERPL-CCNC: 21 U/L (ref 5–50)
ANION GAP SERPL CALCULATED.3IONS-SCNC: 15 MEQ/L (ref 10–19)
AST SERPL-CCNC: 22 U/L (ref 9–50)
AVERAGE GLUCOSE: 117 MG/DL (ref 66–114)
BILIRUB SERPL-MCNC: 1.3 MG/DL
BUN BLDV-MCNC: 18 MG/DL (ref 8–23)
CALCIUM SERPL-MCNC: 8.8 MG/DL (ref 8.5–10.5)
CHLORIDE BLD-SCNC: 104 MEQ/L (ref 95–107)
CHOLESTEROL/HDL RATIO: 2.1
CHOLESTEROL: 138 MG/DL
CO2: 22 MEQ/L (ref 19–31)
CREAT SERPL-MCNC: 0.9 MG/DL (ref 0.8–1.4)
EGFR AFRICAN AMERICAN: 97.9 ML/MIN/1.73 M2
EGFR IF NONAFRICAN AMERICAN: 84.4 ML/MIN/1.73 M2
GLUCOSE: 114 MG/DL (ref 70–99)
HBA1C MFR BLD: 5.7 % (ref 4.2–5.8)
HDLC SERPL-MCNC: 66 MG/DL
LDL CHOLESTEROL CALCULATED: 58 MG/DL
LDL/HDL RATIO: 0.9
POTASSIUM SERPL-SCNC: 4.2 MEQ/L (ref 3.5–5.4)
SODIUM BLD-SCNC: 141 MEQ/L (ref 135–146)
TOTAL PROTEIN: 6.7 G/DL (ref 6.1–8.3)
TRIGL SERPL-MCNC: 68 MG/DL
VLDLC SERPL CALC-MCNC: 14 MG/DL

## 2018-06-21 ENCOUNTER — OFFICE VISIT (OUTPATIENT)
Dept: FAMILY MEDICINE CLINIC | Age: 73
End: 2018-06-21
Payer: MEDICARE

## 2018-06-21 VITALS
SYSTOLIC BLOOD PRESSURE: 102 MMHG | WEIGHT: 249 LBS | BODY MASS INDEX: 35.73 KG/M2 | DIASTOLIC BLOOD PRESSURE: 70 MMHG | HEART RATE: 76 BPM | RESPIRATION RATE: 20 BRPM | TEMPERATURE: 98.2 F

## 2018-06-21 DIAGNOSIS — E78.5 HYPERLIPIDEMIA, UNSPECIFIED HYPERLIPIDEMIA TYPE: ICD-10-CM

## 2018-06-21 DIAGNOSIS — F41.9 ANXIETY: Primary | Chronic | ICD-10-CM

## 2018-06-21 DIAGNOSIS — I10 ESSENTIAL HYPERTENSION, BENIGN: ICD-10-CM

## 2018-06-21 PROCEDURE — G8427 DOCREV CUR MEDS BY ELIG CLIN: HCPCS | Performed by: FAMILY MEDICINE

## 2018-06-21 PROCEDURE — 4040F PNEUMOC VAC/ADMIN/RCVD: CPT | Performed by: FAMILY MEDICINE

## 2018-06-21 PROCEDURE — G8417 CALC BMI ABV UP PARAM F/U: HCPCS | Performed by: FAMILY MEDICINE

## 2018-06-21 PROCEDURE — 99213 OFFICE O/P EST LOW 20 MIN: CPT | Performed by: FAMILY MEDICINE

## 2018-06-21 PROCEDURE — 1036F TOBACCO NON-USER: CPT | Performed by: FAMILY MEDICINE

## 2018-06-21 PROCEDURE — 3017F COLORECTAL CA SCREEN DOC REV: CPT | Performed by: FAMILY MEDICINE

## 2018-06-21 PROCEDURE — 1123F ACP DISCUSS/DSCN MKR DOCD: CPT | Performed by: FAMILY MEDICINE

## 2018-06-21 RX ORDER — ALPRAZOLAM 0.25 MG/1
0.25 TABLET ORAL DAILY
COMMUNITY
End: 2018-06-21 | Stop reason: SDUPTHER

## 2018-06-21 RX ORDER — ALPRAZOLAM 0.25 MG/1
0.25 TABLET ORAL DAILY
Qty: 30 TABLET | Refills: 2 | Status: SHIPPED | OUTPATIENT
Start: 2018-06-21 | End: 2018-07-21

## 2018-06-27 ASSESSMENT — ENCOUNTER SYMPTOMS
NAUSEA: 0
CONSTIPATION: 0
ABDOMINAL PAIN: 0
RHINORRHEA: 0
EYE PAIN: 0
SHORTNESS OF BREATH: 0
SORE THROAT: 0
SINUS PRESSURE: 0
COUGH: 0
ABDOMINAL DISTENTION: 0
DIARRHEA: 0

## 2018-08-17 ENCOUNTER — APPOINTMENT (OUTPATIENT)
Dept: GENERAL RADIOLOGY | Age: 73
DRG: 303 | End: 2018-08-17
Payer: MEDICARE

## 2018-08-17 ENCOUNTER — HOSPITAL ENCOUNTER (INPATIENT)
Age: 73
LOS: 2 days | Discharge: HOME | DRG: 303 | End: 2018-08-19
Attending: EMERGENCY MEDICINE | Admitting: INTERNAL MEDICINE
Payer: MEDICARE

## 2018-08-17 DIAGNOSIS — R07.89 OTHER CHEST PAIN: Primary | ICD-10-CM

## 2018-08-17 PROBLEM — R07.9 CHEST PAIN: Status: ACTIVE | Noted: 2018-08-17

## 2018-08-17 LAB
ANION GAP SERPL CALCULATED.3IONS-SCNC: 13 MEQ/L (ref 8–16)
BASOPHILS # BLD: 0.5 %
BASOPHILS ABSOLUTE: 0 THOU/MM3 (ref 0–0.1)
BUN BLDV-MCNC: 15 MG/DL (ref 7–22)
CALCIUM SERPL-MCNC: 9.4 MG/DL (ref 8.5–10.5)
CHLORIDE BLD-SCNC: 102 MEQ/L (ref 98–111)
CO2: 24 MEQ/L (ref 23–33)
CREAT SERPL-MCNC: 0.9 MG/DL (ref 0.4–1.2)
EKG ATRIAL RATE: 64 BPM
EKG P AXIS: 20 DEGREES
EKG P-R INTERVAL: 148 MS
EKG Q-T INTERVAL: 422 MS
EKG QRS DURATION: 106 MS
EKG QTC CALCULATION (BAZETT): 435 MS
EKG R AXIS: -21 DEGREES
EKG T AXIS: 29 DEGREES
EKG VENTRICULAR RATE: 64 BPM
EOSINOPHIL # BLD: 1.8 %
EOSINOPHILS ABSOLUTE: 0.1 THOU/MM3 (ref 0–0.4)
ERYTHROCYTE [DISTWIDTH] IN BLOOD BY AUTOMATED COUNT: 12.6 % (ref 11.5–14.5)
ERYTHROCYTE [DISTWIDTH] IN BLOOD BY AUTOMATED COUNT: 41.3 FL (ref 35–45)
GFR SERPL CREATININE-BSD FRML MDRD: 83 ML/MIN/1.73M2
GLUCOSE BLD-MCNC: 102 MG/DL (ref 70–108)
HCT VFR BLD CALC: 43.5 % (ref 42–52)
HEMOGLOBIN: 15.2 GM/DL (ref 14–18)
IMMATURE GRANS (ABS): 0.02 THOU/MM3 (ref 0–0.07)
IMMATURE GRANULOCYTES: 0.3 %
LYMPHOCYTES # BLD: 29.2 %
LYMPHOCYTES ABSOLUTE: 2.3 THOU/MM3 (ref 1–4.8)
MCH RBC QN AUTO: 31.1 PG (ref 26–33)
MCHC RBC AUTO-ENTMCNC: 34.9 GM/DL (ref 32.2–35.5)
MCV RBC AUTO: 89 FL (ref 80–94)
MONOCYTES # BLD: 9 %
MONOCYTES ABSOLUTE: 0.7 THOU/MM3 (ref 0.4–1.3)
NUCLEATED RED BLOOD CELLS: 0 /100 WBC
OSMOLALITY CALCULATION: 278.6 MOSMOL/KG (ref 275–300)
PLATELET # BLD: 196 THOU/MM3 (ref 130–400)
PMV BLD AUTO: 10.6 FL (ref 9.4–12.4)
POTASSIUM SERPL-SCNC: 4.2 MEQ/L (ref 3.5–5.2)
RBC # BLD: 4.89 MILL/MM3 (ref 4.7–6.1)
SEG NEUTROPHILS: 59.2 %
SEGMENTED NEUTROPHILS ABSOLUTE COUNT: 4.6 THOU/MM3 (ref 1.8–7.7)
SODIUM BLD-SCNC: 139 MEQ/L (ref 135–145)
TROPONIN T: < 0.01 NG/ML
WBC # BLD: 7.8 THOU/MM3 (ref 4.8–10.8)

## 2018-08-17 PROCEDURE — 6370000000 HC RX 637 (ALT 250 FOR IP): Performed by: INTERNAL MEDICINE

## 2018-08-17 PROCEDURE — 80048 BASIC METABOLIC PNL TOTAL CA: CPT

## 2018-08-17 PROCEDURE — G0378 HOSPITAL OBSERVATION PER HR: HCPCS

## 2018-08-17 PROCEDURE — 36415 COLL VENOUS BLD VENIPUNCTURE: CPT

## 2018-08-17 PROCEDURE — 71046 X-RAY EXAM CHEST 2 VIEWS: CPT

## 2018-08-17 PROCEDURE — 6360000002 HC RX W HCPCS: Performed by: INTERNAL MEDICINE

## 2018-08-17 PROCEDURE — 93010 ELECTROCARDIOGRAM REPORT: CPT | Performed by: NUCLEAR MEDICINE

## 2018-08-17 PROCEDURE — 1200000003 HC TELEMETRY R&B

## 2018-08-17 PROCEDURE — 2580000003 HC RX 258: Performed by: INTERNAL MEDICINE

## 2018-08-17 PROCEDURE — 84484 ASSAY OF TROPONIN QUANT: CPT

## 2018-08-17 PROCEDURE — 85025 COMPLETE CBC W/AUTO DIFF WBC: CPT

## 2018-08-17 PROCEDURE — 96372 THER/PROPH/DIAG INJ SC/IM: CPT

## 2018-08-17 PROCEDURE — 93005 ELECTROCARDIOGRAM TRACING: CPT | Performed by: EMERGENCY MEDICINE

## 2018-08-17 PROCEDURE — 99285 EMERGENCY DEPT VISIT HI MDM: CPT

## 2018-08-17 PROCEDURE — 2709999900 HC NON-CHARGEABLE SUPPLY

## 2018-08-17 RX ORDER — DICYCLOMINE HCL 20 MG
20 TABLET ORAL 2 TIMES DAILY
Status: DISCONTINUED | OUTPATIENT
Start: 2018-08-17 | End: 2018-08-19 | Stop reason: HOSPADM

## 2018-08-17 RX ORDER — POTASSIUM CHLORIDE 7.45 MG/ML
10 INJECTION INTRAVENOUS PRN
Status: DISCONTINUED | OUTPATIENT
Start: 2018-08-17 | End: 2018-08-19 | Stop reason: HOSPADM

## 2018-08-17 RX ORDER — POTASSIUM CHLORIDE 20 MEQ/1
40 TABLET, EXTENDED RELEASE ORAL PRN
Status: DISCONTINUED | OUTPATIENT
Start: 2018-08-17 | End: 2018-08-19 | Stop reason: HOSPADM

## 2018-08-17 RX ORDER — ASCORBIC ACID 500 MG
500 TABLET ORAL DAILY
Status: DISCONTINUED | OUTPATIENT
Start: 2018-08-18 | End: 2018-08-19 | Stop reason: HOSPADM

## 2018-08-17 RX ORDER — MULTIVITAMIN WITH FOLIC ACID 400 MCG
1 TABLET ORAL DAILY
Status: DISCONTINUED | OUTPATIENT
Start: 2018-08-18 | End: 2018-08-19 | Stop reason: HOSPADM

## 2018-08-17 RX ORDER — VITAMIN E 268 MG
400 CAPSULE ORAL DAILY
Status: DISCONTINUED | OUTPATIENT
Start: 2018-08-18 | End: 2018-08-19 | Stop reason: HOSPADM

## 2018-08-17 RX ORDER — PANTOTHENIC ACID (VIT B5) 250 MG
1 TABLET ORAL 2 TIMES DAILY
Status: DISCONTINUED | OUTPATIENT
Start: 2018-08-17 | End: 2018-08-17 | Stop reason: RX

## 2018-08-17 RX ORDER — GUAIFENESIN/PHENYLPROPANOLAMIN
1 EXPECTORANT ORAL 2 TIMES DAILY
Status: DISCONTINUED | OUTPATIENT
Start: 2018-08-17 | End: 2018-08-17 | Stop reason: RX

## 2018-08-17 RX ORDER — BILBERRY 100 MG
100 CAPSULE ORAL DAILY
Status: DISCONTINUED | OUTPATIENT
Start: 2018-08-18 | End: 2018-08-17 | Stop reason: RX

## 2018-08-17 RX ORDER — TAMSULOSIN HYDROCHLORIDE 0.4 MG/1
0.8 CAPSULE ORAL NIGHTLY
Status: DISCONTINUED | OUTPATIENT
Start: 2018-08-17 | End: 2018-08-19 | Stop reason: HOSPADM

## 2018-08-17 RX ORDER — NITROGLYCERIN 0.4 MG/1
0.4 TABLET SUBLINGUAL EVERY 5 MIN PRN
Status: DISCONTINUED | OUTPATIENT
Start: 2018-08-17 | End: 2018-08-19 | Stop reason: HOSPADM

## 2018-08-17 RX ORDER — BLOOD-GLUCOSE METER
1 EACH MISCELLANEOUS WEEKLY
Status: DISCONTINUED | OUTPATIENT
Start: 2018-08-17 | End: 2018-08-17 | Stop reason: RX

## 2018-08-17 RX ORDER — SODIUM CHLORIDE 9 MG/ML
INJECTION, SOLUTION INTRAVENOUS CONTINUOUS
Status: DISCONTINUED | OUTPATIENT
Start: 2018-08-17 | End: 2018-08-18 | Stop reason: CLARIF

## 2018-08-17 RX ORDER — ASPIRIN 81 MG/1
81 TABLET, CHEWABLE ORAL DAILY
Status: DISCONTINUED | OUTPATIENT
Start: 2018-08-18 | End: 2018-08-19 | Stop reason: HOSPADM

## 2018-08-17 RX ORDER — NITROGLYCERIN 0.4 MG/1
0.4 TABLET SUBLINGUAL EVERY 5 MIN PRN
Status: DISCONTINUED | OUTPATIENT
Start: 2018-08-17 | End: 2018-08-17 | Stop reason: SDUPTHER

## 2018-08-17 RX ORDER — LANOLIN ALCOHOL/MO/W.PET/CERES
50 CREAM (GRAM) TOPICAL 2 TIMES DAILY
Status: DISCONTINUED | OUTPATIENT
Start: 2018-08-17 | End: 2018-08-19 | Stop reason: HOSPADM

## 2018-08-17 RX ORDER — HYOSCYAMINE SULFATE 0.125 MG
0.12 TABLET,DISINTEGRATING ORAL EVERY 4 HOURS PRN
Status: DISCONTINUED | OUTPATIENT
Start: 2018-08-17 | End: 2018-08-19 | Stop reason: HOSPADM

## 2018-08-17 RX ORDER — SODIUM CHLORIDE 0.9 % (FLUSH) 0.9 %
10 SYRINGE (ML) INJECTION EVERY 12 HOURS SCHEDULED
Status: DISCONTINUED | OUTPATIENT
Start: 2018-08-17 | End: 2018-08-19 | Stop reason: HOSPADM

## 2018-08-17 RX ORDER — OMEGA-3-ACID ETHYL ESTERS 1 G/1
1 CAPSULE, LIQUID FILLED ORAL DAILY
Status: DISCONTINUED | OUTPATIENT
Start: 2018-08-18 | End: 2018-08-19 | Stop reason: HOSPADM

## 2018-08-17 RX ORDER — SODIUM CHLORIDE 0.9 % (FLUSH) 0.9 %
10 SYRINGE (ML) INJECTION PRN
Status: DISCONTINUED | OUTPATIENT
Start: 2018-08-17 | End: 2018-08-19 | Stop reason: HOSPADM

## 2018-08-17 RX ORDER — AMPICILLIN TRIHYDRATE 250 MG
500 CAPSULE ORAL DAILY
Status: DISCONTINUED | OUTPATIENT
Start: 2018-08-18 | End: 2018-08-17 | Stop reason: RX

## 2018-08-17 RX ORDER — PANTOPRAZOLE SODIUM 40 MG/1
40 TABLET, DELAYED RELEASE ORAL
Status: DISCONTINUED | OUTPATIENT
Start: 2018-08-18 | End: 2018-08-19 | Stop reason: HOSPADM

## 2018-08-17 RX ORDER — ATORVASTATIN CALCIUM 40 MG/1
40 TABLET, FILM COATED ORAL NIGHTLY
Status: DISCONTINUED | OUTPATIENT
Start: 2018-08-17 | End: 2018-08-19 | Stop reason: HOSPADM

## 2018-08-17 RX ORDER — LISINOPRIL 10 MG/1
10 TABLET ORAL DAILY
Status: DISCONTINUED | OUTPATIENT
Start: 2018-08-18 | End: 2018-08-19 | Stop reason: HOSPADM

## 2018-08-17 RX ORDER — ALPRAZOLAM 0.25 MG/1
0.25 TABLET ORAL NIGHTLY PRN
COMMUNITY
End: 2018-09-27 | Stop reason: SDUPTHER

## 2018-08-17 RX ORDER — ASPIRIN 81 MG/1
81 TABLET ORAL DAILY
Status: DISCONTINUED | OUTPATIENT
Start: 2018-08-17 | End: 2018-08-17 | Stop reason: SDUPTHER

## 2018-08-17 RX ORDER — ACETAMINOPHEN 325 MG/1
650 TABLET ORAL EVERY 4 HOURS PRN
Status: DISCONTINUED | OUTPATIENT
Start: 2018-08-17 | End: 2018-08-19 | Stop reason: HOSPADM

## 2018-08-17 RX ORDER — ALPRAZOLAM 0.25 MG/1
0.25 TABLET ORAL NIGHTLY PRN
Status: DISCONTINUED | OUTPATIENT
Start: 2018-08-17 | End: 2018-08-19 | Stop reason: HOSPADM

## 2018-08-17 RX ORDER — ONDANSETRON 2 MG/ML
4 INJECTION INTRAMUSCULAR; INTRAVENOUS EVERY 6 HOURS PRN
Status: DISCONTINUED | OUTPATIENT
Start: 2018-08-17 | End: 2018-08-19 | Stop reason: HOSPADM

## 2018-08-17 RX ORDER — POTASSIUM CHLORIDE 20MEQ/15ML
40 LIQUID (ML) ORAL PRN
Status: DISCONTINUED | OUTPATIENT
Start: 2018-08-17 | End: 2018-08-19 | Stop reason: HOSPADM

## 2018-08-17 RX ADMIN — PYRIDOXINE HCL TAB 50 MG 50 MG: 50 TAB at 22:19

## 2018-08-17 RX ADMIN — DICYCLOMINE HYDROCHLORIDE 20 MG: 20 TABLET ORAL at 22:19

## 2018-08-17 RX ADMIN — ENOXAPARIN SODIUM 40 MG: 40 INJECTION SUBCUTANEOUS at 22:19

## 2018-08-17 RX ADMIN — Medication 10 ML: at 21:22

## 2018-08-17 RX ADMIN — TAMSULOSIN HYDROCHLORIDE 0.8 MG: 0.4 CAPSULE ORAL at 22:19

## 2018-08-17 RX ADMIN — SODIUM CHLORIDE: 9 INJECTION, SOLUTION INTRAVENOUS at 21:21

## 2018-08-17 RX ADMIN — ATORVASTATIN CALCIUM 40 MG: 40 TABLET, FILM COATED ORAL at 22:19

## 2018-08-17 ASSESSMENT — PAIN DESCRIPTION - PROGRESSION
CLINICAL_PROGRESSION: NOT CHANGED
CLINICAL_PROGRESSION: GRADUALLY WORSENING

## 2018-08-17 ASSESSMENT — PAIN DESCRIPTION - PAIN TYPE
TYPE: ACUTE PAIN

## 2018-08-17 ASSESSMENT — ENCOUNTER SYMPTOMS
EYE DISCHARGE: 0
DIARRHEA: 0
COUGH: 0
BACK PAIN: 0
RHINORRHEA: 0
ABDOMINAL PAIN: 0
WHEEZING: 0
NAUSEA: 0
VOMITING: 0
EYE REDNESS: 0
SHORTNESS OF BREATH: 1
SORE THROAT: 0

## 2018-08-17 ASSESSMENT — PAIN DESCRIPTION - ONSET
ONSET: ON-GOING
ONSET: ON-GOING

## 2018-08-17 ASSESSMENT — PAIN DESCRIPTION - ORIENTATION: ORIENTATION: LEFT;RIGHT

## 2018-08-17 ASSESSMENT — PAIN DESCRIPTION - LOCATION
LOCATION: CHEST

## 2018-08-17 ASSESSMENT — PAIN DESCRIPTION - DESCRIPTORS
DESCRIPTORS: PRESSURE
DESCRIPTORS: RADIATING;PRESSURE
DESCRIPTORS: PRESSURE

## 2018-08-17 ASSESSMENT — PAIN DESCRIPTION - FREQUENCY
FREQUENCY: CONTINUOUS
FREQUENCY: CONTINUOUS

## 2018-08-17 ASSESSMENT — PAIN SCALES - GENERAL
PAINLEVEL_OUTOF10: 5
PAINLEVEL_OUTOF10: 5

## 2018-08-17 NOTE — PROGRESS NOTES
Pt arrived in 4K22 via cart/stretcher. Complaints: Chest pain / discomfort. IV IV push only, no IV fluids infusing into the antecubital right, condition patent and no redness at a rate of 0 mls/ hour with about 0 mls remaining in the bag.     The best day to schedule a follow up Dr appointment is: Afternoons      The patient is interested in [facility]. [facility] to [facility] program?:  No

## 2018-08-17 NOTE — ED PROVIDER NOTES
decreased breath sounds. He has no wheezes. He has no rhonchi. He has no rales. Abdominal: Soft. Bowel sounds are normal. He exhibits no distension. There is no tenderness. There is no rebound, no guarding and no CVA tenderness. Musculoskeletal: Normal range of motion. He exhibits no edema. Neurological: He is alert and oriented to person, place, and time. He exhibits normal muscle tone. Coordination normal.   Skin: Skin is warm and dry. No rash noted. He is not diaphoretic. Nursing note and vitals reviewed. DIFFERENTIAL DIAGNOSIS:   Including but not limited to angina, MI, and ACS. DIAGNOSTIC RESULTS     EKG: All EKG's are interpreted by the Emergency Department Physician who either signs or Co-signs this chart in the absence of a cardiologist.  EKG interpreted by Chayo Jean DO:    Flavio. Rate: 64 bpm  NE interval: 148 ms  QRS duration: 106 ms  QTc: 435 ms  P-R-T axes: 20, -21, 29  Normal sinus rhythm  Incomplete right bundle branch block  No STEMI. Compared to old EKG on 17-Jul-2016    RADIOLOGY: non-plain film images(s) such as CT, Ultrasound and MRI are read by the radiologist.    XR CHEST STANDARD (2 VW)   Final Result      No acute intrathoracic process. **This report has been created using voice recognition software. It may contain minor errors which are inherent in voice recognition technology. **      Final report electronically signed by Dr. Sangeetha Moore on 8/17/2018 2:36 PM          LABS:   Labs Reviewed   GLOMERULAR FILTRATION RATE, ESTIMATED - Abnormal; Notable for the following:        Result Value    Est, Glom Filt Rate 83 (*)     All other components within normal limits   HEPATIC FUNCTION PANEL - Abnormal; Notable for the following:      Total Bilirubin 1.8 (*)     All other components within normal limits   CBC - Abnormal; Notable for the following:     RBC 4.57 (*)     Hematocrit 41.6 (*)     All other components within normal limits   TROPONIN   CBC WITH AUTO DIFFERENTIAL   BASIC METABOLIC PANEL   ANION GAP   OSMOLALITY   LIPID PANEL   TROPONIN       EMERGENCY DEPARTMENT COURSE:   Vitals:    Vitals:    08/18/18 0403 08/18/18 0458 08/18/18 0823 08/18/18 1104   BP: 115/67  120/76 113/70   Pulse: 63  77 59   Resp: 16 16 18   Temp: 98 °F (36.7 °C)  97.4 °F (36.3 °C) 96.8 °F (36 °C)   TempSrc: Oral  Oral Oral   SpO2: 95%  98% 95%   Weight:  241 lb (109.3 kg)     Height:           3:27 PM: The patient was seen and evaluated     MDM:  Patient was seen and evaluated by me at bedside for chest pain, bilateral jaw pain, and fatigue. History and physical exam were obtained which resulted negative. Patient did not appear to be in any distress. I ordered a XR of the chest which resulted unremarkable but his ECG showed a right bundle branch block. His labs were within acceptable range but based on his age, symptoms, and ECG I decided to consult the hospitalist and cardiology. Based on the patient's presentation, history, and workup here in the department, it was decided to bring the patient into the hospital for admission. We did discuss this plan with the patient and available family and they did agree to the plan of admission. Cardiology  service was consulted regarding this patient and did graciously accept this patient in the hospital for admission. Patient was admitted to the hospital in fair condition. CRITICAL CARE:   None     CONSULTS:  None    PROCEDURES:  None     FINAL IMPRESSION      1.  Other chest pain          DISPOSITION/PLAN   Admit    PATIENT REFERRED TO:  Kathy Bray MD  [address] [facility] [address]  [phone]            DISCHARGE MEDICATIONS:  Current Discharge Medication List          (Please note that portions of this note were completed with a voice recognition program.  Efforts were made to edit the dictations but occasionally words are mis-transcribed.)    Scribe:  Carlene Gallardo 8/17/18 3:27 PM Scribing for and in the presence of Juanito Armendariz, DO.    Signed by: Keith Perez(Name), Veronica, 08/18/18 11:49 AM    Provider:  I personally performed the services described in the documentation, reviewed and edited the documentation which was dictated to the scribe in my presence, and it accurately records my words and actions.     Juanito Armendariz,  8/17/18 11:49 AM        Juanito Armendariz,   08/18/18 1149

## 2018-08-17 NOTE — ED NOTES
Pt and family informed of admission status and family requesting something to eat and drink while waiting on transport to admission room. Significant other provided with a lunch box and water as requested. Vs reassessed and stable pt currently denies any chest pain. Call light in reach and sr up x1. Will continue to monitor and update on status.      Robin Wilder RN  08/17/18 8162

## 2018-08-17 NOTE — ED PROVIDER NOTES
[facility] Triage Note-Initiation of of Medical Screening Exam      Morgan Tovar is a 67 y.o. male who presents to the Emergency Department with complaint of chest pain and jaw pain onset 1 week ago. Patient describes his pain as pressure-like. He has been increasingly fatigue and weak as well as short of breath with any exertion. Patient follows up with Dr. Angeles Ness (Cardiologist). Patient has a stent. Denies fever or chills, nausea or emesis, palpitations, or wheezing. Brief Exam  Normal Exam. Normal heart rhythm and breath sounds. Interventions:    Appropriate labs and imaging were ordered prior to patient transferred to higher level of care. The patient was initially triaged and workup initiated by Barbara Wallis Please see provider dictation for full History and Physical for further details of the patient's visit today. Scribe:  Han Velazquez(Name) 8/17/18 2:18 PM Scribing for and in the presence of [employer]. Signed by: Han Velazquez(Name), Veronica, 08/17/18 2:33 PM    Provider:  I personally performed the services described in the documentation, reviewed and edited the documentation which was dictated to the scribe in my presence, and it accurately records my words and actions.     [employer] PA-C 08/17/18 2:33 PM        Devon Rodriguezma  08/17/18 7274

## 2018-08-18 LAB
ALBUMIN SERPL-MCNC: 4 G/DL (ref 3.5–5.1)
ALP BLD-CCNC: 69 U/L (ref 38–126)
ALT SERPL-CCNC: 21 U/L (ref 11–66)
AST SERPL-CCNC: 24 U/L (ref 5–40)
BILIRUB SERPL-MCNC: 1.8 MG/DL (ref 0.3–1.2)
BILIRUBIN DIRECT: 0.3 MG/DL (ref 0–0.3)
CHOLESTEROL, TOTAL: 139 MG/DL (ref 100–199)
EKG ATRIAL RATE: 55 BPM
EKG P AXIS: 54 DEGREES
EKG P-R INTERVAL: 176 MS
EKG Q-T INTERVAL: 438 MS
EKG QRS DURATION: 116 MS
EKG QTC CALCULATION (BAZETT): 419 MS
EKG R AXIS: -26 DEGREES
EKG T AXIS: 41 DEGREES
EKG VENTRICULAR RATE: 55 BPM
ERYTHROCYTE [DISTWIDTH] IN BLOOD BY AUTOMATED COUNT: 12.8 % (ref 11.5–14.5)
ERYTHROCYTE [DISTWIDTH] IN BLOOD BY AUTOMATED COUNT: 42 FL (ref 35–45)
HCT VFR BLD CALC: 41.6 % (ref 42–52)
HDLC SERPL-MCNC: 56 MG/DL
HEMOGLOBIN: 14.2 GM/DL (ref 14–18)
LDL CHOLESTEROL CALCULATED: 61 MG/DL
MCH RBC QN AUTO: 31.1 PG (ref 26–33)
MCHC RBC AUTO-ENTMCNC: 34.1 GM/DL (ref 32.2–35.5)
MCV RBC AUTO: 91 FL (ref 80–94)
PLATELET # BLD: 181 THOU/MM3 (ref 130–400)
PMV BLD AUTO: 10.6 FL (ref 9.4–12.4)
RBC # BLD: 4.57 MILL/MM3 (ref 4.7–6.1)
TOTAL PROTEIN: 6.6 G/DL (ref 6.1–8)
TRIGL SERPL-MCNC: 110 MG/DL (ref 0–199)
TROPONIN T: < 0.01 NG/ML
WBC # BLD: 7 THOU/MM3 (ref 4.8–10.8)

## 2018-08-18 PROCEDURE — 2580000003 HC RX 258: Performed by: INTERNAL MEDICINE

## 2018-08-18 PROCEDURE — 96372 THER/PROPH/DIAG INJ SC/IM: CPT

## 2018-08-18 PROCEDURE — 36415 COLL VENOUS BLD VENIPUNCTURE: CPT

## 2018-08-18 PROCEDURE — 80076 HEPATIC FUNCTION PANEL: CPT

## 2018-08-18 PROCEDURE — 1200000003 HC TELEMETRY R&B

## 2018-08-18 PROCEDURE — G0378 HOSPITAL OBSERVATION PER HR: HCPCS

## 2018-08-18 PROCEDURE — 6370000000 HC RX 637 (ALT 250 FOR IP): Performed by: INTERNAL MEDICINE

## 2018-08-18 PROCEDURE — 80061 LIPID PANEL: CPT

## 2018-08-18 PROCEDURE — 93010 ELECTROCARDIOGRAM REPORT: CPT | Performed by: NUCLEAR MEDICINE

## 2018-08-18 PROCEDURE — 93005 ELECTROCARDIOGRAM TRACING: CPT | Performed by: INTERNAL MEDICINE

## 2018-08-18 PROCEDURE — 84484 ASSAY OF TROPONIN QUANT: CPT

## 2018-08-18 PROCEDURE — 6360000002 HC RX W HCPCS: Performed by: INTERNAL MEDICINE

## 2018-08-18 PROCEDURE — 85027 COMPLETE CBC AUTOMATED: CPT

## 2018-08-18 RX ADMIN — Medication 10 ML: at 20:24

## 2018-08-18 RX ADMIN — ATORVASTATIN CALCIUM 40 MG: 40 TABLET, FILM COATED ORAL at 20:24

## 2018-08-18 RX ADMIN — ASPIRIN 81 MG CHEWABLE TABLET 81 MG: 81 TABLET CHEWABLE at 09:05

## 2018-08-18 RX ADMIN — PANTOPRAZOLE SODIUM 40 MG: 40 TABLET, DELAYED RELEASE ORAL at 05:01

## 2018-08-18 RX ADMIN — METOPROLOL TARTRATE 25 MG: 25 TABLET ORAL at 22:24

## 2018-08-18 RX ADMIN — LISINOPRIL 10 MG: 10 TABLET ORAL at 09:05

## 2018-08-18 RX ADMIN — ALPRAZOLAM 0.25 MG: 0.25 TABLET ORAL at 00:42

## 2018-08-18 RX ADMIN — VITAMIN E CAP 400 UNIT 400 UNITS: 400 CAP at 09:05

## 2018-08-18 RX ADMIN — DICYCLOMINE HYDROCHLORIDE 20 MG: 20 TABLET ORAL at 09:05

## 2018-08-18 RX ADMIN — ENOXAPARIN SODIUM 40 MG: 40 INJECTION SUBCUTANEOUS at 20:24

## 2018-08-18 RX ADMIN — ACETAMINOPHEN 650 MG: 325 TABLET ORAL at 00:41

## 2018-08-18 RX ADMIN — PYRIDOXINE HCL TAB 50 MG 50 MG: 50 TAB at 09:06

## 2018-08-18 RX ADMIN — DICYCLOMINE HYDROCHLORIDE 20 MG: 20 TABLET ORAL at 20:24

## 2018-08-18 RX ADMIN — THERA TABS 1 TABLET: TAB at 09:05

## 2018-08-18 RX ADMIN — TAMSULOSIN HYDROCHLORIDE 0.8 MG: 0.4 CAPSULE ORAL at 20:24

## 2018-08-18 RX ADMIN — Medication 500 MG: at 09:05

## 2018-08-18 RX ADMIN — ALPRAZOLAM 0.25 MG: 0.25 TABLET ORAL at 22:24

## 2018-08-18 RX ADMIN — OMEGA-3-ACID ETHYL ESTERS 1 G: 1 CAPSULE, LIQUID FILLED ORAL at 09:05

## 2018-08-18 RX ADMIN — VITAMIN D, TAB 1000IU (100/BT) 2000 UNITS: 25 TAB at 09:05

## 2018-08-18 RX ADMIN — PYRIDOXINE HCL TAB 50 MG 50 MG: 50 TAB at 20:24

## 2018-08-18 RX ADMIN — ACETAMINOPHEN 650 MG: 325 TABLET ORAL at 05:05

## 2018-08-18 ASSESSMENT — PAIN SCALES - GENERAL
PAINLEVEL_OUTOF10: 0
PAINLEVEL_OUTOF10: 0
PAINLEVEL_OUTOF10: 5
PAINLEVEL_OUTOF10: 0
PAINLEVEL_OUTOF10: 7
PAINLEVEL_OUTOF10: 0
PAINLEVEL_OUTOF10: 0

## 2018-08-18 ASSESSMENT — PAIN DESCRIPTION - DESCRIPTORS: DESCRIPTORS: HEADACHE

## 2018-08-18 ASSESSMENT — PAIN DESCRIPTION - PROGRESSION: CLINICAL_PROGRESSION: GRADUALLY WORSENING

## 2018-08-18 ASSESSMENT — PAIN DESCRIPTION - PAIN TYPE: TYPE: ACUTE PAIN

## 2018-08-18 ASSESSMENT — PAIN DESCRIPTION - LOCATION: LOCATION: HEAD

## 2018-08-18 ASSESSMENT — PAIN DESCRIPTION - FREQUENCY: FREQUENCY: INTERMITTENT

## 2018-08-18 ASSESSMENT — PAIN DESCRIPTION - ONSET: ONSET: SUDDEN

## 2018-08-18 NOTE — PLAN OF CARE
Problem: Pain Control  Goal: Maintain pain level at or below patient's acceptable level (or 5 if patient is unable to determine acceptable level)  Outcome: Ongoing  PRN pain medication is available reviewed pain scale with patient. Problem: Cardiovascular  Goal: Hemodynamic stability  Outcome: Ongoing  Vitals stable this shift as noted in chart. Problem: Respiratory  Goal: No pulmonary complications  Outcome: Completed Date Met: 08/18/18      Problem:   Goal: Adequate urinary output  Outcome: Completed Date Met: 08/18/18      Problem: Nutrition  Goal: Optimal nutrition therapy  Outcome: Ongoing  Encouraged patient to eat a balanced meal this shift. Problem: Skin Integrity/Risk  Goal: No skin breakdown during hospitalization  Outcome: Ongoing  No new skin breakdown this shift, encouraged patient to change position often    Problem: Musculor/Skeletal Functional Status  Goal: Absence of falls  Outcome: Ongoing  No fall this shift, continue with falling star program.     Problem: Discharge Planning:  Goal: Discharged to appropriate level of care  Discharged to appropriate level of care   Outcome: Ongoing  Plan is to discharge patient home when medically stable. Comments: Care plan reviewed with patient. Patient  verbalize understanding of the plan of care and contribute to goal setting.

## 2018-08-19 VITALS
HEIGHT: 72 IN | WEIGHT: 241.4 LBS | BODY MASS INDEX: 32.7 KG/M2 | RESPIRATION RATE: 16 BRPM | OXYGEN SATURATION: 94 % | SYSTOLIC BLOOD PRESSURE: 105 MMHG | TEMPERATURE: 97.8 F | HEART RATE: 62 BPM | DIASTOLIC BLOOD PRESSURE: 63 MMHG

## 2018-08-19 PROCEDURE — G0378 HOSPITAL OBSERVATION PER HR: HCPCS

## 2018-08-19 PROCEDURE — 2580000003 HC RX 258: Performed by: INTERNAL MEDICINE

## 2018-08-19 PROCEDURE — 6370000000 HC RX 637 (ALT 250 FOR IP): Performed by: INTERNAL MEDICINE

## 2018-08-19 RX ADMIN — VITAMIN E CAP 400 UNIT 400 UNITS: 400 CAP at 08:12

## 2018-08-19 RX ADMIN — Medication 10 ML: at 08:13

## 2018-08-19 RX ADMIN — PANTOPRAZOLE SODIUM 40 MG: 40 TABLET, DELAYED RELEASE ORAL at 05:13

## 2018-08-19 RX ADMIN — DICYCLOMINE HYDROCHLORIDE 20 MG: 20 TABLET ORAL at 08:12

## 2018-08-19 RX ADMIN — THERA TABS 1 TABLET: TAB at 08:12

## 2018-08-19 RX ADMIN — PYRIDOXINE HCL TAB 50 MG 50 MG: 50 TAB at 08:12

## 2018-08-19 RX ADMIN — OMEGA-3-ACID ETHYL ESTERS 1 G: 1 CAPSULE, LIQUID FILLED ORAL at 08:13

## 2018-08-19 RX ADMIN — ASPIRIN 81 MG CHEWABLE TABLET 81 MG: 81 TABLET CHEWABLE at 08:12

## 2018-08-19 RX ADMIN — Medication 500 MG: at 08:12

## 2018-08-19 RX ADMIN — LISINOPRIL 10 MG: 10 TABLET ORAL at 08:12

## 2018-08-19 RX ADMIN — VITAMIN D, TAB 1000IU (100/BT) 2000 UNITS: 25 TAB at 08:12

## 2018-08-19 ASSESSMENT — PAIN SCALES - GENERAL
PAINLEVEL_OUTOF10: 0

## 2018-08-19 NOTE — H&P
[address] [place] [address]                               HISTORY AND PHYSICAL    PATIENT NAME: Pineda Swenson                  :        1945  MED REC NO:   [id number]                           ROOM:       0022  ACCOUNT NO:   [de-identified]                           ADMIT DATE: 2018  PROVIDER:     Joey Felix. Iza Delgado M.D. REASON FOR EVALUATION:  Chest pain. HISTORY OF PRESENT ILLNESS:  The patient is a 27-year-old gentleman who is  known to me from prior encounter. He is known to have a history of  coronary artery disease. Back in , he did have a CYPHER stent to the  RCA. The patient back in  had a heart cath which showed his coronary  arteries were patent at that time. The patient has been having chest pain  in the past few weeks, described as a discomfort, tightness, and heaviness  in the chest in the retrosternal area, radiated to the neck, associated  with significant shortness of breath. He indicates anytime he tried to do  some physical activity, he has to sit down because of severe sweating and  shortness of breath. The patient denied PND or orthopnea. He denied  palpitations, dizziness, or syncopal episodes. REVIEW OF SYSTEMS:  He denies fever, chills, or rigors. He is not aware of  thyroid disease. He is not aware of palpitation. No history of pulmonary  disease. No GI bleed or bleeding disorders. The patient has a history of  irritable bowel syndrome, history of hypertension, dyslipidemia, and  gastroesophageal reflux. The patient has arthritis. He has no change in  his weight. He has sleep apnea. He utilized the CPAP. The patient had a  history of cancer of the bladder.     PRIOR INTERVENTIONS:  The patient has a history of intervention of the RCA  in the past; history of back surgery; history of esophageal stricture  dilatation; history of hernia repair; and history of hip surgery, right  total hip back in 2014. SOCIAL HISTORY:  He is a former smoker. Denies alcohol or recreational  drug abuse. ALLERGIES:  The patient has allergies to AVODART. PHYSICAL EXAMINATION:  VITAL SIGNS:  He weighed 240 pounds. Blood pressure is 110/60. He is in  sinus rhythm. He was afebrile. Respiratory rate was 14. NEUROLOGIC:  The patient has no focal neurological deficit. NECK:  He has no JVD. No carotid bruit. There was no thyromegaly. No  lymphadenopathy. HEENT:  No discharge from the throat, ear, or nose. LUNGS:  Show scattered rhonchi. HEART:  There was no S3.  ABDOMEN:  Soft. GENITAL/RECTAL:  Deferred. EXTREMITIES:  Lower limbs, there is no edema. DIAGNOSTIC/LABORATORY DATA:  EKG showed sinus rhythm with right  bundle-branch block. No acute ST-T wave changes. Troponin is negative. IMPRESSION:  1. The patient with history of coronary artery disease, intervention back  in 2007, admitted with diagnosis of unstable angina, rule out MI. Cardiac  enzymes will be obtained. 2.  History of hypertension. 3.  History of hypercholesterolemia. 4.  History of CA of the bladder. 5.  History of dyslipidemia. 6.  History of esophageal stricture. 7.  Shortness of breath and sweating. Further recommendation pending the results of the above tests and  hospitalization course. Arun Herndon M.D.    D: 08/18/2018 16:19:14       T: 08/18/2018 16:21:53     [email or web address]  Job#: [id number]     Doc#: [id number]    CC:  Ezio Ness M.D.

## 2018-08-19 NOTE — PLAN OF CARE
Problem: Pain Control  Goal: Maintain pain level at or below patient's acceptable level (or 5 if patient is unable to determine acceptable level)  Outcome: Met This Shift  Pain Assessment: 0-10  Pain Level: 0   Pain goal:  0  Is pain goal met at this time? Yes  Pain Intervention(s): Medication (see eMar) (Tylenol)  Additional interventions to be implemented: medications Tylenol      Problem: Cardiovascular  Goal: No DVT, peripheral vascular complications  Outcome: Met This Shift  Patient getting daily Lovenox injections for DVT prophylaxis. Goal: Hemodynamic stability  Outcome: Ongoing  Vital signs have remained stable and within normal limits. Vitals being monitored every 4 hours and as needed. Continuous cardiac monitor in place. Heart rhythm is sinus bradycardia. HR between 50's-60's. Dr. Hal Rich aware of patient's bradycardia. Problem: Nutrition  Goal: Optimal nutrition therapy  Outcome: Met This Shift  Patient on a cardiac diet and tolerating well. Problem: Skin Integrity/Risk  Goal: No skin breakdown during hospitalization  Outcome: Met This Shift  No areas of skin breakdown noted tonight. Skin is warm and dry. Patient able to turn and reposition himself independently. Problem: Musculor/Skeletal Functional Status  Goal: Absence of falls  Outcome: Ongoing  Patient has remained free from falls tonight. Call light and bedside table are within reach. Patient alert and oriented and uses his call light appropriately. Patient ambulates independently in the room and halls. Fall precautions in place for his safety. Nurse checks on the patient every hour. Problem: Discharge Planning:  Goal: Discharged to appropriate level of care  Discharged to appropriate level of care   Outcome: Ongoing  No discharge plans currently. Patient from home with wife.      Problem: Tissue Perfusion - Cardiopulmonary, Altered:  Goal: Absence of angina  Absence of angina   Outcome: Met This Shift  Patient has denied having any CP tonight. Comments: Care plan reviewed with patient. Patient verbalize understanding of the plan of care and contribute to goal setting.

## 2018-08-20 NOTE — DISCHARGE SUMMARY
[address] [id number]                                 DISCHARGE SUMMARY    PATIENT NAME: Evelin Arreguin                  :        1945  MED REC NO:   [id number]                           ROOM:       0022  ACCOUNT NO:   [de-identified]                           ADMIT DATE: 2018  PROVIDER:     Andres Medeiros. Bailey Kawasaki, M.D.               Caiocruz Langekaleb: 2018    FINAL DIAGNOSES:  1. Chest pain, possible angina pectoris. 2.  History of coronary artery disease, history of prior intervention. 3.  History of hypertension. 4.  Anxiety and possible depression. 5.  Chronic history of CA of the bladder. 6.  History of back surgery. 7.  History of hip surgery. 8.  History of gastroesophageal reflux and esophageal stricture with  dilatation of the esophagus. 9.  History of dyslipidemia. 10.  History of prostatitis. 11.  History of irritable bowel syndrome. 12.  History of glucose intolerance. BRIEF HISTORY:  This is a patient who is a 79-year-old gentleman with a  known history of coronary artery disease. He had the prior history of  intervention. This patient presented to emergency room with the chest  pain. HOSPITALIZATION COURSE:  The patient placed on IV nitroglycerin. He was  admitted to the telemetry bed. Cardiac enzymes were negative. Chest pain  symptoms subside. The patient ambulated and he continued to be chest pain  free. The patient was given the option to have a heart test versus a  stress test.  The patient chose to have a stress test.  The patient  indicates he is under a lot of stress at home. The patient advised to  follow up with family physician. He will continue his home medication. Arrangement will be made for the patient to have further cardiac workup as  an outpatient. He is to seek medical attention if he has any change in  heart condition and follow up in the office.   Follow with

## 2018-09-27 ENCOUNTER — OFFICE VISIT (OUTPATIENT)
Dept: FAMILY MEDICINE CLINIC | Age: 73
End: 2018-09-27
Payer: MEDICARE

## 2018-09-27 VITALS
SYSTOLIC BLOOD PRESSURE: 118 MMHG | DIASTOLIC BLOOD PRESSURE: 62 MMHG | BODY MASS INDEX: 33.31 KG/M2 | TEMPERATURE: 98.8 F | HEART RATE: 68 BPM | RESPIRATION RATE: 20 BRPM | WEIGHT: 245.6 LBS

## 2018-09-27 DIAGNOSIS — E78.5 HYPERLIPIDEMIA, UNSPECIFIED HYPERLIPIDEMIA TYPE: Primary | ICD-10-CM

## 2018-09-27 DIAGNOSIS — F41.9 ANXIETY: Chronic | ICD-10-CM

## 2018-09-27 DIAGNOSIS — I10 ESSENTIAL HYPERTENSION, BENIGN: ICD-10-CM

## 2018-09-27 PROCEDURE — G8417 CALC BMI ABV UP PARAM F/U: HCPCS | Performed by: FAMILY MEDICINE

## 2018-09-27 PROCEDURE — 1123F ACP DISCUSS/DSCN MKR DOCD: CPT | Performed by: FAMILY MEDICINE

## 2018-09-27 PROCEDURE — 99213 OFFICE O/P EST LOW 20 MIN: CPT | Performed by: FAMILY MEDICINE

## 2018-09-27 PROCEDURE — G0008 ADMIN INFLUENZA VIRUS VAC: HCPCS | Performed by: FAMILY MEDICINE

## 2018-09-27 PROCEDURE — 3017F COLORECTAL CA SCREEN DOC REV: CPT | Performed by: FAMILY MEDICINE

## 2018-09-27 PROCEDURE — 1101F PT FALLS ASSESS-DOCD LE1/YR: CPT | Performed by: FAMILY MEDICINE

## 2018-09-27 PROCEDURE — 1036F TOBACCO NON-USER: CPT | Performed by: FAMILY MEDICINE

## 2018-09-27 PROCEDURE — 90662 IIV NO PRSV INCREASED AG IM: CPT | Performed by: FAMILY MEDICINE

## 2018-09-27 PROCEDURE — G8427 DOCREV CUR MEDS BY ELIG CLIN: HCPCS | Performed by: FAMILY MEDICINE

## 2018-09-27 PROCEDURE — 4040F PNEUMOC VAC/ADMIN/RCVD: CPT | Performed by: FAMILY MEDICINE

## 2018-09-27 RX ORDER — DICYCLOMINE HCL 20 MG
20 TABLET ORAL 2 TIMES DAILY
Qty: 180 TABLET | Refills: 3 | Status: SHIPPED | OUTPATIENT
Start: 2018-09-27 | End: 2019-09-17 | Stop reason: SDUPTHER

## 2018-09-27 RX ORDER — ATORVASTATIN CALCIUM 40 MG/1
40 TABLET, FILM COATED ORAL NIGHTLY
Qty: 90 TABLET | Refills: 3 | Status: SHIPPED | OUTPATIENT
Start: 2018-09-27 | End: 2019-09-17 | Stop reason: SDUPTHER

## 2018-09-27 RX ORDER — ALPRAZOLAM 0.25 MG/1
0.25 TABLET ORAL NIGHTLY PRN
Qty: 30 TABLET | Refills: 2 | Status: SHIPPED | OUTPATIENT
Start: 2018-09-27 | End: 2018-12-20 | Stop reason: SDUPTHER

## 2018-09-27 ASSESSMENT — PATIENT HEALTH QUESTIONNAIRE - PHQ9
1. LITTLE INTEREST OR PLEASURE IN DOING THINGS: 0
2. FEELING DOWN, DEPRESSED OR HOPELESS: 0
SUM OF ALL RESPONSES TO PHQ QUESTIONS 1-9: 0
SUM OF ALL RESPONSES TO PHQ9 QUESTIONS 1 & 2: 0
SUM OF ALL RESPONSES TO PHQ QUESTIONS 1-9: 0

## 2018-09-29 ASSESSMENT — ENCOUNTER SYMPTOMS
DIARRHEA: 0
BACK PAIN: 0
NAUSEA: 0
COUGH: 0
ABDOMINAL PAIN: 0
SORE THROAT: 0
SHORTNESS OF BREATH: 0
WHEEZING: 0
RHINORRHEA: 0
VOMITING: 0
CONSTIPATION: 0

## 2018-09-29 NOTE — PROGRESS NOTES
2007    DILATATION, ESOPHAGUS      ENDOSCOPY, COLON, DIAGNOSTIC      HERNIA REPAIR  2005    Dr. Pacheco Orient Right 3/25/2014    right total hip    OTHER SURGICAL HISTORY  08/25/2016    TURBT by Dr Nelia Mortimer     Family History   Problem Relation Age of Onset    Heart Disease Mother     Diabetes Mother     Kidney Disease Father     Cancer Brother         colon     Social History   Substance Use Topics    Smoking status: Former Smoker     Packs/day: 1.00     Years: 25.00     Quit date: 1/1/1995    Smokeless tobacco: Former User    Alcohol use 0.6 oz/week     1 Cans of beer per week      Comment: occas      Current Outpatient Prescriptions   Medication Sig Dispense Refill    NONFORMULARY Take 450 mg by mouth 2 times daily      ALPRAZolam (XANAX) 0.25 MG tablet Take 1 tablet by mouth nightly as needed for Sleep for up to 30 days. . 30 tablet 2    atorvastatin (LIPITOR) 40 MG tablet Take 1 tablet by mouth nightly 90 tablet 3    dicyclomine (BENTYL) 20 MG tablet Take 1 tablet by mouth 2 times daily 180 tablet 3    metoprolol tartrate (LOPRESSOR) 25 MG tablet Take 1 tablet by mouth daily 90 tablet 3    lisinopril (PRINIVIL;ZESTRIL) 10 MG tablet Take 1 tablet by mouth daily 90 tablet 3    tamsulosin (FLOMAX) 0.4 MG capsule Take 2 capsules by mouth nightly 180 capsule 3    omeprazole (PRILOSEC) 20 MG delayed release capsule Take 20 mg by mouth daily      glucose blood VI test strips (TRUE METRIX BLOOD GLUCOSE TEST) strip 1 each by In Vitro route daily As needed.  100 each 3    Blood Glucose Calibration (CONTROL) Normal SOLN 1 drop by In Vitro route once a week 1 each 1    hyoscyamine (NULEV) 125 MCG TBDP dispersible tablet Take 1 tablet by mouth every 4 hours as needed (abd pain) 360 tablet 3    nitroGLYCERIN (NITROSTAT) 0.4 MG SL tablet Place 1 tablet under the tongue every 5 minutes as needed for Chest pain 25 tablet 5    Glucose Blood (BLOOD GLUCOSE TEST STRIPS) [facility] [employer] True Metrix Test strips Test BS's every two weeks as directed Dx: R73.9 50 strip 3    Cinnamon 500 MG CAPS Take 500 mg by mouth Daily      Milk Thistle 1000 MG CAPS Take 1,000 mg by mouth Daily      Blood Glucose Monitoring Suppl KIT [employer] True Metrix Glucometer Dx: R73.9 1 kit 0    Lancets MISC TruePlus Super Thin 28G lancets Test BS's every 2 weeks as directed Dx: R73.9 50 each 11    Saw Palmetto 500 MG CAPS Take 1 tablet by mouth 2 times daily.  Ubiquinol 100 MG CAPS Take 1 tablet by mouth daily.  Bilberry 100 MG CAPS Take 100 mg by mouth Daily       fish oil-omega-3 fatty acids 1000 MG capsule Take 1 g by mouth daily.  aspirin 81 MG EC tablet Take 81 mg by mouth daily.  Chromium Picolinate 500 MCG TABS Take 500 mg by mouth daily.  Vitamin E 400 UNIT TABS Take 400 mg by mouth daily.  Ascorbic Acid (VITAMIN C CR) 500 MG TBCR Take 500 mg by mouth daily.  Methylsulfonylmethane (MSM) 1500 MG TABS Take 1,500 mg by mouth daily.  Lycopene 10 MG CAPS Take 20 mg by mouth daily.  Multiple Vitamin (MULTIVITAMIN PO) Take 1 tablet by mouth daily.  Cholecalciferol (VITAMIN D3) 2000 UNIT TABS Take 2,000 mg by mouth daily.  Pyridoxine HCl (VITAMIN B-6) 50 MG tablet Take 50 mg by mouth 2 times daily.  Vitamins-Lipotropics (BALANCED B-50 COMPLEX PO) Take  by mouth 2 times daily. No current facility-administered medications for this visit.       Allergies   Allergen Reactions    Avodart [Dutasteride] Other (See Comments)     Chest tightness     Health Maintenance   Topic Date Due    DTaP/Tdap/Td vaccine (1 - Tdap) 03/20/2014    Shingles Vaccine (1 of 2 - 2 Dose Series) 11/01/2014    A1C test (Diabetic or Prediabetic)  06/15/2019    Potassium monitoring  08/17/2019    Creatinine monitoring  08/17/2019    Lipid screen  08/18/2023    Colon cancer screen colonoscopy  11/13/2027    Flu vaccine  Completed    Pneumococcal low/med risk

## 2018-10-01 ENCOUNTER — PROCEDURE VISIT (OUTPATIENT)
Dept: UROLOGY | Age: 73
End: 2018-10-01
Payer: MEDICARE

## 2018-10-01 VITALS
BODY MASS INDEX: 33.32 KG/M2 | SYSTOLIC BLOOD PRESSURE: 118 MMHG | DIASTOLIC BLOOD PRESSURE: 70 MMHG | WEIGHT: 246 LBS | HEIGHT: 72 IN

## 2018-10-01 DIAGNOSIS — N40.1 BENIGN PROSTATIC HYPERPLASIA WITH INCOMPLETE BLADDER EMPTYING: ICD-10-CM

## 2018-10-01 DIAGNOSIS — R39.14 BENIGN PROSTATIC HYPERPLASIA WITH INCOMPLETE BLADDER EMPTYING: ICD-10-CM

## 2018-10-01 DIAGNOSIS — D49.4 BLADDER TUMOR: ICD-10-CM

## 2018-10-01 DIAGNOSIS — C67.0 MALIGNANT NEOPLASM OF TRIGONE OF URINARY BLADDER (HCC): Primary | ICD-10-CM

## 2018-10-01 LAB
BILIRUBIN URINE: NEGATIVE
BLOOD URINE, POC: ABNORMAL
CHARACTER, URINE: CLEAR
COLOR, URINE: YELLOW
GLUCOSE URINE: NEGATIVE MG/DL
KETONES, URINE: NEGATIVE
LEUKOCYTE CLUMPS, URINE: ABNORMAL
NITRITE, URINE: NEGATIVE
PH, URINE: 7
PROTEIN, URINE: NEGATIVE MG/DL
SPECIFIC GRAVITY, URINE: 1.01 (ref 1–1.03)
UROBILINOGEN, URINE: 0.2 EU/DL

## 2018-10-01 PROCEDURE — 52000 CYSTOURETHROSCOPY: CPT | Performed by: UROLOGY

## 2018-10-01 PROCEDURE — 99999 PR OFFICE/OUTPT VISIT,PROCEDURE ONLY: CPT | Performed by: UROLOGY

## 2018-10-01 PROCEDURE — 81003 URINALYSIS AUTO W/O SCOPE: CPT | Performed by: UROLOGY

## 2018-11-20 ENCOUNTER — OFFICE VISIT (OUTPATIENT)
Dept: PULMONOLOGY | Age: 73
End: 2018-11-20
Payer: MEDICARE

## 2018-11-20 VITALS
WEIGHT: 247 LBS | HEART RATE: 62 BPM | SYSTOLIC BLOOD PRESSURE: 110 MMHG | HEIGHT: 73 IN | OXYGEN SATURATION: 96 % | BODY MASS INDEX: 32.74 KG/M2 | DIASTOLIC BLOOD PRESSURE: 80 MMHG

## 2018-11-20 DIAGNOSIS — Z99.89 OBSTRUCTIVE SLEEP APNEA ON CPAP: Primary | ICD-10-CM

## 2018-11-20 DIAGNOSIS — E66.9 OBESITY (BMI 30.0-34.9): ICD-10-CM

## 2018-11-20 DIAGNOSIS — G47.33 OBSTRUCTIVE SLEEP APNEA ON CPAP: Primary | ICD-10-CM

## 2018-11-20 PROCEDURE — G8482 FLU IMMUNIZE ORDER/ADMIN: HCPCS | Performed by: PHYSICIAN ASSISTANT

## 2018-11-20 PROCEDURE — G8417 CALC BMI ABV UP PARAM F/U: HCPCS | Performed by: PHYSICIAN ASSISTANT

## 2018-11-20 PROCEDURE — 4040F PNEUMOC VAC/ADMIN/RCVD: CPT | Performed by: PHYSICIAN ASSISTANT

## 2018-11-20 PROCEDURE — 1036F TOBACCO NON-USER: CPT | Performed by: PHYSICIAN ASSISTANT

## 2018-11-20 PROCEDURE — 1123F ACP DISCUSS/DSCN MKR DOCD: CPT | Performed by: PHYSICIAN ASSISTANT

## 2018-11-20 PROCEDURE — 1101F PT FALLS ASSESS-DOCD LE1/YR: CPT | Performed by: PHYSICIAN ASSISTANT

## 2018-11-20 PROCEDURE — 3017F COLORECTAL CA SCREEN DOC REV: CPT | Performed by: PHYSICIAN ASSISTANT

## 2018-11-20 PROCEDURE — 99213 OFFICE O/P EST LOW 20 MIN: CPT | Performed by: PHYSICIAN ASSISTANT

## 2018-11-20 PROCEDURE — G8427 DOCREV CUR MEDS BY ELIG CLIN: HCPCS | Performed by: PHYSICIAN ASSISTANT

## 2018-11-20 ASSESSMENT — ENCOUNTER SYMPTOMS
STRIDOR: 0
CHEST TIGHTNESS: 0
SHORTNESS OF BREATH: 0
NAUSEA: 0
COUGH: 0
BACK PAIN: 0
ALLERGIC/IMMUNOLOGIC NEGATIVE: 1
EYES NEGATIVE: 1
WHEEZING: 0
DIARRHEA: 0

## 2018-12-20 ENCOUNTER — OFFICE VISIT (OUTPATIENT)
Dept: FAMILY MEDICINE CLINIC | Age: 73
End: 2018-12-20
Payer: MEDICARE

## 2018-12-20 VITALS
RESPIRATION RATE: 16 BRPM | DIASTOLIC BLOOD PRESSURE: 60 MMHG | WEIGHT: 246 LBS | BODY MASS INDEX: 32.46 KG/M2 | SYSTOLIC BLOOD PRESSURE: 90 MMHG | HEART RATE: 68 BPM

## 2018-12-20 DIAGNOSIS — F41.9 ANXIETY: Chronic | ICD-10-CM

## 2018-12-20 DIAGNOSIS — E78.5 HYPERLIPIDEMIA, UNSPECIFIED HYPERLIPIDEMIA TYPE: ICD-10-CM

## 2018-12-20 DIAGNOSIS — D17.0 LIPOMA OF NECK: Primary | ICD-10-CM

## 2018-12-20 DIAGNOSIS — I10 ESSENTIAL HYPERTENSION, BENIGN: ICD-10-CM

## 2018-12-20 DIAGNOSIS — N40.1 BPH WITH OBSTRUCTION/LOWER URINARY TRACT SYMPTOMS: ICD-10-CM

## 2018-12-20 DIAGNOSIS — N13.8 BPH WITH OBSTRUCTION/LOWER URINARY TRACT SYMPTOMS: ICD-10-CM

## 2018-12-20 PROCEDURE — 4040F PNEUMOC VAC/ADMIN/RCVD: CPT | Performed by: FAMILY MEDICINE

## 2018-12-20 PROCEDURE — 3017F COLORECTAL CA SCREEN DOC REV: CPT | Performed by: FAMILY MEDICINE

## 2018-12-20 PROCEDURE — 99213 OFFICE O/P EST LOW 20 MIN: CPT | Performed by: FAMILY MEDICINE

## 2018-12-20 PROCEDURE — 1036F TOBACCO NON-USER: CPT | Performed by: FAMILY MEDICINE

## 2018-12-20 PROCEDURE — G8417 CALC BMI ABV UP PARAM F/U: HCPCS | Performed by: FAMILY MEDICINE

## 2018-12-20 PROCEDURE — 1101F PT FALLS ASSESS-DOCD LE1/YR: CPT | Performed by: FAMILY MEDICINE

## 2018-12-20 PROCEDURE — G8482 FLU IMMUNIZE ORDER/ADMIN: HCPCS | Performed by: FAMILY MEDICINE

## 2018-12-20 PROCEDURE — 1123F ACP DISCUSS/DSCN MKR DOCD: CPT | Performed by: FAMILY MEDICINE

## 2018-12-20 PROCEDURE — G8427 DOCREV CUR MEDS BY ELIG CLIN: HCPCS | Performed by: FAMILY MEDICINE

## 2018-12-20 RX ORDER — TAMSULOSIN HYDROCHLORIDE 0.4 MG/1
0.8 CAPSULE ORAL NIGHTLY
Qty: 180 CAPSULE | Refills: 3 | Status: SHIPPED | OUTPATIENT
Start: 2018-12-20 | End: 2019-12-17 | Stop reason: SDUPTHER

## 2018-12-20 RX ORDER — HYOSCYAMINE SULFATE 0.125 MG
0.12 TABLET,DISINTEGRATING ORAL EVERY 4 HOURS PRN
Qty: 360 TABLET | Refills: 3 | Status: SHIPPED | OUTPATIENT
Start: 2018-12-20 | End: 2020-06-08 | Stop reason: SDUPTHER

## 2018-12-20 RX ORDER — LISINOPRIL 10 MG/1
10 TABLET ORAL DAILY
Qty: 90 TABLET | Refills: 3 | Status: SHIPPED | OUTPATIENT
Start: 2018-12-20 | End: 2019-03-21 | Stop reason: SDUPTHER

## 2018-12-20 RX ORDER — ALPRAZOLAM 0.25 MG/1
0.25 TABLET ORAL NIGHTLY PRN
Qty: 30 TABLET | Refills: 2 | Status: SHIPPED | OUTPATIENT
Start: 2018-12-20 | End: 2019-03-21 | Stop reason: SDUPTHER

## 2018-12-21 ASSESSMENT — ENCOUNTER SYMPTOMS
VOMITING: 0
SHORTNESS OF BREATH: 0
ABDOMINAL PAIN: 0
COUGH: 0
SORE THROAT: 0
BACK PAIN: 0
CONSTIPATION: 0
RHINORRHEA: 0
WHEEZING: 0
DIARRHEA: 0
NAUSEA: 0

## 2018-12-21 NOTE — PROGRESS NOTES
[address] [facility] [facility] [address] [address]  Dept: [phone]  Dept Fax: [phone]  Loc: [phone]  PROGRESS NOTE      VisitDate: 12/20/2018    Katerina Fitzgerald is a 75 y.o. male who presents today for:  Chief Complaint   Patient presents with    3 Month Follow-Up     anxiety, hyperlipidemia    Medication Refill     wants Rx's printed. Subjective:  HPI  Follow-up anxiety and hyperlipidemia. Both are stable. Due for refills. Denies any other issues    Review of Systems   Constitutional: Negative for chills, fatigue and fever. HENT: Negative for congestion, rhinorrhea and sore throat. Respiratory: Negative for cough, shortness of breath and wheezing. Cardiovascular: Negative for chest pain, palpitations and leg swelling. Gastrointestinal: Negative for abdominal pain, constipation, diarrhea, nausea and vomiting. Genitourinary: Negative for dysuria, frequency and urgency. Musculoskeletal: Negative for arthralgias, back pain and joint swelling. Skin: Negative for rash. Neurological: Negative for dizziness, light-headedness, numbness and headaches. Past Medical History:   Diagnosis Date    Arthritis     CAD (coronary artery disease)     GERD (gastroesophageal reflux disease)     Hyperglycemia 3/18/2016    Hyperlipidemia     Hypertension     IBS (irritable bowel syndrome)     Kidney stones     Malignant neoplasm of trigone of urinary bladder (HCC)     Obstructive sleep apnea     wears cpap    Prostatitis       Past Surgical History:   Procedure Laterality Date    ABDOMEN SURGERY      BACK SURGERY  07/08/2013    Lumbar Coyle L2-S-1, revision, PLIF L5-S-1 w/ grafting    CARDIAC CATHETERIZATION  9/2007    COLON SURGERY  2008    Dr. Donta Grier  2011?     Dr. Flor Schafer  2007    DILATATION, ESOPHAGUS      ENDOSCOPY, COLON, DIAGNOSTIC      HERNIA REPAIR  2005    Dr. Francine Caballero Right 3/25/2014    right total hip    OTHER SURGICAL HISTORY  08/25/2016    TURBT by Dr Elizabeth Phillip     Family History   Problem Relation Age of Onset    Heart Disease Mother     Diabetes Mother     Kidney Disease Father     Cancer Brother         colon     Social History   Substance Use Topics    Smoking status: Former Smoker     Packs/day: 1.00     Years: 25.00     Quit date: 1/1/1995    Smokeless tobacco: Former User    Alcohol use 0.6 oz/week     1 Cans of beer per week      Comment: occas      Current Outpatient Prescriptions   Medication Sig Dispense Refill    hyoscyamine (NULEV) 125 MCG TBDP dispersible tablet Take 1 tablet by mouth every 4 hours as needed (abd pain) 360 tablet 3    ALPRAZolam (XANAX) 0.25 MG tablet Take 1 tablet by mouth nightly as needed for Sleep for up to 30 days. . 30 tablet 2    lisinopril (PRINIVIL;ZESTRIL) 10 MG tablet Take 1 tablet by mouth daily 90 tablet 3    tamsulosin (FLOMAX) 0.4 MG capsule Take 2 capsules by mouth nightly 180 capsule 3    NONFORMULARY Take 450 mg by mouth 2 times daily      atorvastatin (LIPITOR) 40 MG tablet Take 1 tablet by mouth nightly 90 tablet 3    dicyclomine (BENTYL) 20 MG tablet Take 1 tablet by mouth 2 times daily 180 tablet 3    metoprolol tartrate (LOPRESSOR) 25 MG tablet Take 1 tablet by mouth daily 90 tablet 3    omeprazole (PRILOSEC) 20 MG delayed release capsule Take 20 mg by mouth daily      nitroGLYCERIN (NITROSTAT) 0.4 MG SL tablet Place 1 tablet under the tongue every 5 minutes as needed for Chest pain 25 tablet 5    Glucose Blood (BLOOD GLUCOSE TEST STRIPS) STRP [employer] True Metrix Test strips Test BS's every two weeks as directed Dx: R73.9 50 strip 3    Cinnamon 500 MG CAPS Take 500 mg by mouth Daily      Milk Thistle 1000 MG CAPS Take 1,000 mg by mouth Daily      Lancets MISC TruePlus Super Thin 28G lancets Test BS's every 2 weeks as directed Dx: R73.9 50 Normal rate, regular rhythm and normal heart sounds. Pulmonary/Chest: Effort normal and breath sounds normal. He has no wheezes. He has no rales. Musculoskeletal: He exhibits no edema or tenderness. Neurological: He is alert and oriented to person, place, and time. Skin: Skin is warm and dry. He is not diaphoretic. No pallor. BP 90/60   Pulse 68   Resp 16   Wt 246 lb (111.6 kg)   BMI 32.46 kg/m²       Impression/Plan:  1. Lipoma of neck    2. Anxiety    3. BPH with obstruction/lower urinary tract symptoms    4. Hyperlipidemia, unspecified hyperlipidemia type    5. Essential hypertension, benign      Requested Prescriptions     Signed Prescriptions Disp Refills    hyoscyamine (NULEV) 125 MCG TBDP dispersible tablet 360 tablet 3     Sig: Take 1 tablet by mouth every 4 hours as needed (abd pain)    ALPRAZolam (XANAX) 0.25 MG tablet 30 tablet 2     Sig: Take 1 tablet by mouth nightly as needed for Sleep for up to 30 days. Norma Duque lisinopril (PRINIVIL;ZESTRIL) 10 MG tablet 90 tablet 3     Sig: Take 1 tablet by mouth daily    tamsulosin (FLOMAX) 0.4 MG capsule 180 capsule 3     Sig: Take 2 capsules by mouth nightly     Orders Placed This Encounter   Procedures    Lipid Panel     Standing Status:   Future     Standing Expiration Date:   12/20/2019     Order Specific Question:   Is Patient Fasting?/# of Hours     Answer:   yes/12    Comprehensive Metabolic Panel     Standing Status:   Future     Standing Expiration Date:   12/20/2019    CBC Auto Differential     Standing Status:   Future     Standing Expiration Date:   12/20/2019       Patient giveneducational materials - see patient instructions. Discussed use, benefit, and side effects of prescribed medications. All patient questions answered. Pt voiced understanding. Reviewed health maintenance. Patient agreedwith treatment plan. Follow up as directed. **This report has been created using voice recognition software.  It may contain minor

## 2019-03-14 LAB
ABSOLUTE BASO #: 0.1 K/UL (ref 0–0.1)
ABSOLUTE EOS #: 0.2 K/UL (ref 0.1–0.4)
ABSOLUTE LYMPH #: 2.7 K/UL (ref 0.8–5.2)
ABSOLUTE MONO #: 0.6 K/UL (ref 0.1–0.9)
ABSOLUTE NEUT #: 3.8 K/UL (ref 1.3–9.1)
ALBUMIN SERPL-MCNC: 4.5 G/DL (ref 3.5–5.2)
ALK PHOSPHATASE: 79 U/L (ref 39–118)
ALT SERPL-CCNC: 23 U/L (ref 5–50)
ANION GAP SERPL CALCULATED.3IONS-SCNC: 12 MEQ/L (ref 10–19)
AST SERPL-CCNC: 26 U/L (ref 9–50)
BASOPHILS RELATIVE PERCENT: 0.8 %
BILIRUB SERPL-MCNC: 1.6 MG/DL
BUN BLDV-MCNC: 13 MG/DL (ref 8–23)
CALCIUM SERPL-MCNC: 9.2 MG/DL (ref 8.5–10.5)
CHLORIDE BLD-SCNC: 106 MEQ/L (ref 95–107)
CHOLESTEROL/HDL RATIO: 1.9
CHOLESTEROL: 127 MG/DL
CO2: 23 MEQ/L (ref 19–31)
CREAT SERPL-MCNC: 0.9 MG/DL (ref 0.8–1.4)
EGFR AFRICAN AMERICAN: 97.2 ML/MIN/1.73 M2
EGFR IF NONAFRICAN AMERICAN: 83.8 ML/MIN/1.73 M2
EOSINOPHILS RELATIVE PERCENT: 3.1 %
GLUCOSE: 120 MG/DL (ref 70–99)
HCT VFR BLD CALC: 41 % (ref 41.4–51)
HDLC SERPL-MCNC: 67.7 MG/DL
HEMOGLOBIN: 14.1 G/DL (ref 13.8–17)
LDL CHOLESTEROL CALCULATED: 46 MG/DL
LDL/HDL RATIO: 0.7
LYMPHOCYTE %: 35.8 %
MCH RBC QN AUTO: 30.9 PG (ref 27–34)
MCHC RBC AUTO-ENTMCNC: 34.4 G/DL (ref 31–36)
MCV RBC AUTO: 89.7 FL (ref 80–100)
MONOCYTES # BLD: 8.6 %
NEUTROPHILS RELATIVE PERCENT: 51.4 %
PDW BLD-RTO: 12.2 % (ref 10.8–14.8)
PLATELETS: 187 K/UL (ref 150–450)
POTASSIUM SERPL-SCNC: 4.2 MEQ/L (ref 3.5–5.4)
RBC: 4.57 M/UL (ref 4–5.5)
SODIUM BLD-SCNC: 141 MEQ/L (ref 135–146)
TOTAL PROTEIN: 6.7 G/DL (ref 6.1–8.3)
TRIGL SERPL-MCNC: 66 MG/DL
VLDLC SERPL CALC-MCNC: 13 MG/DL
WBC: 7.5 K/UL (ref 3.7–10.8)

## 2019-03-21 ENCOUNTER — OFFICE VISIT (OUTPATIENT)
Dept: FAMILY MEDICINE CLINIC | Age: 74
End: 2019-03-21
Payer: MEDICARE

## 2019-03-21 VITALS
HEART RATE: 88 BPM | BODY MASS INDEX: 32.55 KG/M2 | TEMPERATURE: 98.2 F | WEIGHT: 245.6 LBS | RESPIRATION RATE: 16 BRPM | DIASTOLIC BLOOD PRESSURE: 64 MMHG | SYSTOLIC BLOOD PRESSURE: 110 MMHG | HEIGHT: 73 IN

## 2019-03-21 DIAGNOSIS — I10 ESSENTIAL HYPERTENSION, BENIGN: ICD-10-CM

## 2019-03-21 DIAGNOSIS — F41.9 ANXIETY: Chronic | ICD-10-CM

## 2019-03-21 DIAGNOSIS — E78.5 HYPERLIPIDEMIA, UNSPECIFIED HYPERLIPIDEMIA TYPE: ICD-10-CM

## 2019-03-21 DIAGNOSIS — M75.50 SUBACROMIAL BURSITIS: Primary | ICD-10-CM

## 2019-03-21 PROCEDURE — 99213 OFFICE O/P EST LOW 20 MIN: CPT | Performed by: FAMILY MEDICINE

## 2019-03-21 PROCEDURE — 1123F ACP DISCUSS/DSCN MKR DOCD: CPT | Performed by: FAMILY MEDICINE

## 2019-03-21 PROCEDURE — 1101F PT FALLS ASSESS-DOCD LE1/YR: CPT | Performed by: FAMILY MEDICINE

## 2019-03-21 PROCEDURE — G8482 FLU IMMUNIZE ORDER/ADMIN: HCPCS | Performed by: FAMILY MEDICINE

## 2019-03-21 PROCEDURE — G8427 DOCREV CUR MEDS BY ELIG CLIN: HCPCS | Performed by: FAMILY MEDICINE

## 2019-03-21 PROCEDURE — 3017F COLORECTAL CA SCREEN DOC REV: CPT | Performed by: FAMILY MEDICINE

## 2019-03-21 PROCEDURE — 4040F PNEUMOC VAC/ADMIN/RCVD: CPT | Performed by: FAMILY MEDICINE

## 2019-03-21 PROCEDURE — 1036F TOBACCO NON-USER: CPT | Performed by: FAMILY MEDICINE

## 2019-03-21 PROCEDURE — 96372 THER/PROPH/DIAG INJ SC/IM: CPT | Performed by: FAMILY MEDICINE

## 2019-03-21 PROCEDURE — G8417 CALC BMI ABV UP PARAM F/U: HCPCS | Performed by: FAMILY MEDICINE

## 2019-03-21 RX ORDER — METHYLPREDNISOLONE ACETATE 80 MG/ML
160 INJECTION, SUSPENSION INTRA-ARTICULAR; INTRALESIONAL; INTRAMUSCULAR; SOFT TISSUE ONCE
Status: COMPLETED | OUTPATIENT
Start: 2019-03-21 | End: 2019-03-21

## 2019-03-21 RX ORDER — BLOOD-GLUCOSE METER
EACH MISCELLANEOUS
Qty: 1 EACH | Refills: 3 | Status: SHIPPED | OUTPATIENT
Start: 2019-03-21 | End: 2020-10-05 | Stop reason: SDUPTHER

## 2019-03-21 RX ORDER — ALPRAZOLAM 0.25 MG/1
0.25 TABLET ORAL NIGHTLY PRN
Qty: 30 TABLET | Refills: 2 | Status: SHIPPED | OUTPATIENT
Start: 2019-03-21 | End: 2019-06-14 | Stop reason: SDUPTHER

## 2019-03-21 RX ORDER — OMEPRAZOLE 20 MG/1
20 CAPSULE, DELAYED RELEASE ORAL DAILY
Qty: 90 CAPSULE | Refills: 3 | Status: SHIPPED | OUTPATIENT
Start: 2019-03-21 | End: 2020-03-16 | Stop reason: SDUPTHER

## 2019-03-21 RX ORDER — GLUCOSAMINE HCL/CHONDROITIN SU 500-400 MG
CAPSULE ORAL
Qty: 100 STRIP | Refills: 3 | Status: SHIPPED | OUTPATIENT
Start: 2019-03-21 | End: 2020-06-08 | Stop reason: SDUPTHER

## 2019-03-21 RX ORDER — LISINOPRIL 10 MG/1
10 TABLET ORAL DAILY
Qty: 90 TABLET | Refills: 3 | Status: SHIPPED | OUTPATIENT
Start: 2019-03-21 | End: 2020-03-16 | Stop reason: SDUPTHER

## 2019-03-21 RX ADMIN — METHYLPREDNISOLONE ACETATE 160 MG: 80 INJECTION, SUSPENSION INTRA-ARTICULAR; INTRALESIONAL; INTRAMUSCULAR; SOFT TISSUE at 14:26

## 2019-03-21 ASSESSMENT — PATIENT HEALTH QUESTIONNAIRE - PHQ9
2. FEELING DOWN, DEPRESSED OR HOPELESS: 0
1. LITTLE INTEREST OR PLEASURE IN DOING THINGS: 0
SUM OF ALL RESPONSES TO PHQ QUESTIONS 1-9: 0
SUM OF ALL RESPONSES TO PHQ QUESTIONS 1-9: 0
SUM OF ALL RESPONSES TO PHQ9 QUESTIONS 1 & 2: 0

## 2019-03-24 ASSESSMENT — ENCOUNTER SYMPTOMS
BACK PAIN: 0
COUGH: 0
SHORTNESS OF BREATH: 0
DIARRHEA: 0
WHEEZING: 0
ABDOMINAL PAIN: 0
RHINORRHEA: 0
NAUSEA: 0
SORE THROAT: 0
CONSTIPATION: 0
VOMITING: 0

## 2019-06-14 ENCOUNTER — OFFICE VISIT (OUTPATIENT)
Dept: FAMILY MEDICINE CLINIC | Age: 74
End: 2019-06-14
Payer: MEDICARE

## 2019-06-14 VITALS
TEMPERATURE: 98.6 F | WEIGHT: 240.4 LBS | BODY MASS INDEX: 31.72 KG/M2 | RESPIRATION RATE: 16 BRPM | DIASTOLIC BLOOD PRESSURE: 70 MMHG | HEART RATE: 66 BPM | SYSTOLIC BLOOD PRESSURE: 128 MMHG

## 2019-06-14 DIAGNOSIS — F41.9 ANXIETY: Chronic | ICD-10-CM

## 2019-06-14 DIAGNOSIS — L25.9 CONTACT DERMATITIS, UNSPECIFIED CONTACT DERMATITIS TYPE, UNSPECIFIED TRIGGER: Primary | ICD-10-CM

## 2019-06-14 PROCEDURE — 96372 THER/PROPH/DIAG INJ SC/IM: CPT | Performed by: FAMILY MEDICINE

## 2019-06-14 PROCEDURE — 3017F COLORECTAL CA SCREEN DOC REV: CPT | Performed by: FAMILY MEDICINE

## 2019-06-14 PROCEDURE — G8417 CALC BMI ABV UP PARAM F/U: HCPCS | Performed by: FAMILY MEDICINE

## 2019-06-14 PROCEDURE — G8427 DOCREV CUR MEDS BY ELIG CLIN: HCPCS | Performed by: FAMILY MEDICINE

## 2019-06-14 PROCEDURE — 99213 OFFICE O/P EST LOW 20 MIN: CPT | Performed by: FAMILY MEDICINE

## 2019-06-14 PROCEDURE — 1036F TOBACCO NON-USER: CPT | Performed by: FAMILY MEDICINE

## 2019-06-14 PROCEDURE — 1123F ACP DISCUSS/DSCN MKR DOCD: CPT | Performed by: FAMILY MEDICINE

## 2019-06-14 PROCEDURE — 4040F PNEUMOC VAC/ADMIN/RCVD: CPT | Performed by: FAMILY MEDICINE

## 2019-06-14 RX ORDER — ALPRAZOLAM 0.25 MG/1
0.25 TABLET ORAL NIGHTLY PRN
Qty: 30 TABLET | Refills: 2 | Status: SHIPPED | OUTPATIENT
Start: 2019-06-14 | End: 2019-07-14

## 2019-06-14 RX ORDER — ALPRAZOLAM 0.25 MG/1
TABLET ORAL
Refills: 2 | COMMUNITY
Start: 2019-05-26 | End: 2019-06-17 | Stop reason: SDUPTHER

## 2019-06-14 RX ORDER — METHYLPREDNISOLONE ACETATE 80 MG/ML
160 INJECTION, SUSPENSION INTRA-ARTICULAR; INTRALESIONAL; INTRAMUSCULAR; SOFT TISSUE ONCE
Status: COMPLETED | OUTPATIENT
Start: 2019-06-14 | End: 2019-06-14

## 2019-06-14 RX ADMIN — METHYLPREDNISOLONE ACETATE 160 MG: 80 INJECTION, SUSPENSION INTRA-ARTICULAR; INTRALESIONAL; INTRAMUSCULAR; SOFT TISSUE at 12:36

## 2019-06-14 NOTE — PROGRESS NOTES
Administrations This Visit     methylPREDNISolone acetate (DEPO-MEDROL) injection 160 mg     Admin Date  06/14/2019  12:36 Action  Given Dose  160 mg Route  Intramuscular Site  Dorsogluteal Left Administered By  Diana Hernandez    Ordering Provider:  Yumiko Barraza MD    NDC:  2685-6190-04    Lot#:  [id number]    :  [address].     Patient Supplied?:  No    Comments:  exp 3/21

## 2019-06-16 ASSESSMENT — ENCOUNTER SYMPTOMS
RHINORRHEA: 0
SORE THROAT: 0
ABDOMINAL DISTENTION: 0
NAUSEA: 0
SINUS PRESSURE: 0
SHORTNESS OF BREATH: 0
COUGH: 0
EYE PAIN: 0
ABDOMINAL PAIN: 0
CONSTIPATION: 0
DIARRHEA: 0

## 2019-06-17 ENCOUNTER — OFFICE VISIT (OUTPATIENT)
Dept: FAMILY MEDICINE CLINIC | Age: 74
End: 2019-06-17
Payer: MEDICARE

## 2019-06-17 VITALS
SYSTOLIC BLOOD PRESSURE: 112 MMHG | DIASTOLIC BLOOD PRESSURE: 70 MMHG | RESPIRATION RATE: 20 BRPM | TEMPERATURE: 97.9 F | HEART RATE: 64 BPM | WEIGHT: 239 LBS | BODY MASS INDEX: 31.53 KG/M2

## 2019-06-17 DIAGNOSIS — L25.9 CONTACT DERMATITIS, UNSPECIFIED CONTACT DERMATITIS TYPE, UNSPECIFIED TRIGGER: Primary | ICD-10-CM

## 2019-06-17 PROCEDURE — 1123F ACP DISCUSS/DSCN MKR DOCD: CPT | Performed by: FAMILY MEDICINE

## 2019-06-17 PROCEDURE — 4040F PNEUMOC VAC/ADMIN/RCVD: CPT | Performed by: FAMILY MEDICINE

## 2019-06-17 PROCEDURE — 99213 OFFICE O/P EST LOW 20 MIN: CPT | Performed by: FAMILY MEDICINE

## 2019-06-17 PROCEDURE — G8427 DOCREV CUR MEDS BY ELIG CLIN: HCPCS | Performed by: FAMILY MEDICINE

## 2019-06-17 PROCEDURE — G8417 CALC BMI ABV UP PARAM F/U: HCPCS | Performed by: FAMILY MEDICINE

## 2019-06-17 PROCEDURE — 3017F COLORECTAL CA SCREEN DOC REV: CPT | Performed by: FAMILY MEDICINE

## 2019-06-17 PROCEDURE — 1036F TOBACCO NON-USER: CPT | Performed by: FAMILY MEDICINE

## 2019-06-17 RX ORDER — PREDNISONE 10 MG/1
TABLET ORAL
Qty: 30 TABLET | Refills: 0 | Status: SHIPPED | OUTPATIENT
Start: 2019-06-17 | End: 2019-06-27

## 2019-06-17 RX ORDER — MOMETASONE FUROATE 1 MG/G
CREAM TOPICAL
Qty: 45 G | Refills: 1 | Status: SHIPPED | OUTPATIENT
Start: 2019-06-17 | End: 2019-09-17 | Stop reason: ALTCHOICE

## 2019-06-17 RX ORDER — FEXOFENADINE HCL 180 MG/1
180 TABLET ORAL DAILY
Qty: 30 TABLET | Refills: 0 | Status: SHIPPED | OUTPATIENT
Start: 2019-06-17 | End: 2019-07-17

## 2019-06-17 ASSESSMENT — ENCOUNTER SYMPTOMS
CONSTIPATION: 0
SINUS PRESSURE: 0
DIARRHEA: 0
COUGH: 0
SORE THROAT: 0
RHINORRHEA: 0
ABDOMINAL DISTENTION: 0
EYE PAIN: 0
SHORTNESS OF BREATH: 0
NAUSEA: 0
ABDOMINAL PAIN: 0

## 2019-06-17 NOTE — PROGRESS NOTES
ENDOSCOPY, COLON, DIAGNOSTIC      HERNIA REPAIR      Dr. Andre Whitmore Right 3/25/2014    right total hip    OTHER SURGICAL HISTORY  2016    TURBT by Dr Esme Blevins     Family History   Problem Relation Age of Onset    Heart Disease Mother     Diabetes Mother     Kidney Disease Father     Cancer Brother         colon     Social History     Tobacco Use    Smoking status: Former Smoker     Packs/day: 1.00     Years: 25.00     Pack years: 25.00     Last attempt to quit: 1995     Years since quittin.4    Smokeless tobacco: Former User   Substance Use Topics    Alcohol use: Yes     Alcohol/week: 0.6 oz     Types: 1 Cans of beer per week     Comment: occas      Current Outpatient Medications   Medication Sig Dispense Refill    predniSONE (DELTASONE) 10 MG tablet 4 po qd for 3 days, then 3 po qd for 3 days, then 2 po qd for 3 days, then 1 po qd for 3 days 30 tablet 0    fexofenadine (ALLEGRA) 180 MG tablet Take 1 tablet by mouth daily 30 tablet 0    mometasone (ELOCON) 0.1 % cream Apply topically 2x daily. 45 g 1    ALPRAZolam (XANAX) 0.25 MG tablet Take 1 tablet by mouth nightly as needed for Sleep for up to 30 days.  30 tablet 2    lisinopril (PRINIVIL;ZESTRIL) 10 MG tablet Take 1 tablet by mouth daily 90 tablet 3    omeprazole (PRILOSEC) 20 MG delayed release capsule Take 1 capsule by mouth daily 90 capsule 3    blood glucose monitor strips True Metrix Test Strips, test QD Diagnosis:E11.9 100 strip 3    Blood Glucose Calibration (CONTROL) Normal SOLN True Metrix Control Solution, Diagnosis:E11.9 1 each 3    hyoscyamine (NULEV) 125 MCG TBDP dispersible tablet Take 1 tablet by mouth every 4 hours as needed (abd pain) 360 tablet 3    tamsulosin (FLOMAX) 0.4 MG capsule Take 2 capsules by mouth nightly 180 capsule 3    NONFORMULARY Take 450 mg by mouth 2 times daily      atorvastatin (LIPITOR) 40 MG tablet Take 1 tablet by mouth nightly 90 tablet 3    dicyclomine (BENTYL) 20 MG tablet Take 1 tablet by mouth 2 times daily 180 tablet 3    metoprolol tartrate (LOPRESSOR) 25 MG tablet Take 1 tablet by mouth daily 90 tablet 3    nitroGLYCERIN (NITROSTAT) 0.4 MG SL tablet Place 1 tablet under the tongue every 5 minutes as needed for Chest pain 25 tablet 5    Glucose Blood (BLOOD GLUCOSE TEST STRIPS) STRP [employer] True Metrix Test strips Test BS's every two weeks as directed Dx: R73.9 50 strip 3    Cinnamon 500 MG CAPS Take 500 mg by mouth Daily      Milk Thistle 1000 MG CAPS Take 1,000 mg by mouth Daily      Lancets MISC TruePlus Super Thin 28G lancets Test BS's every 2 weeks as directed Dx: R73.9 50 each 11    Saw Palmetto 500 MG CAPS Take 1 tablet by mouth 2 times daily.  Ubiquinol 100 MG CAPS Take 1 tablet by mouth daily.  Bilberry 100 MG CAPS Take 100 mg by mouth Daily       fish oil-omega-3 fatty acids 1000 MG capsule Take 1 g by mouth daily.  aspirin 81 MG EC tablet Take 81 mg by mouth daily.  Chromium Picolinate 500 MCG TABS Take 500 mg by mouth daily.  Vitamin E 400 UNIT TABS Take 400 mg by mouth daily.  Ascorbic Acid (VITAMIN C CR) 500 MG TBCR Take 500 mg by mouth daily.  Methylsulfonylmethane (MSM) 1500 MG TABS Take 1,500 mg by mouth daily.  Lycopene 10 MG CAPS Take 20 mg by mouth daily.  Multiple Vitamin (MULTIVITAMIN PO) Take 1 tablet by mouth daily.  Cholecalciferol (VITAMIN D3) 2000 UNIT TABS Take 2,000 mg by mouth daily.  Pyridoxine HCl (VITAMIN B-6) 50 MG tablet Take 50 mg by mouth 2 times daily.  Vitamins-Lipotropics (BALANCED B-50 COMPLEX PO) Take  by mouth 2 times daily. No current facility-administered medications for this visit.       Allergies   Allergen Reactions    Avodart [Dutasteride] Other (See Comments)     Chest tightness     Health Maintenance   Topic Date Due    DTaP/Tdap/Td vaccine (1 - Tdap) 03/20/2014    Shingles Vaccine (2 of 3) 10/27/2014    A1C test (Diabetic or Prediabetic)  06/15/2019    Potassium monitoring  2020    Creatinine monitoring  2020    Lipid screen  2024    Colon cancer screen colonoscopy  2027    Flu vaccine  Completed    Pneumococcal 65+ years Vaccine  Completed    AAA screen  Completed         Objective:     Physical Exam   Constitutional: He is oriented to person, place, and time. He appears well-developed and well-nourished. No distress. HENT:   Head: Normocephalic and atraumatic. Right Ear: External ear normal.   Left Ear: External ear normal.   Eyes: Conjunctivae are normal.   Neck: No JVD present. Cardiovascular: Normal rate, regular rhythm and normal heart sounds. Pulmonary/Chest: Effort normal and breath sounds normal. He has no wheezes. He has no rales. Musculoskeletal: He exhibits no edema or tenderness. Neurological: He is alert and oriented to person, place, and time. Skin: Skin is warm and dry. Rash noted. He is not diaphoretic. No pallor. Diffuse rash from the trunk upper and lower extremities with linear vesiculation surrounded by diffuse erythema     /70   Pulse 64   Temp 97.9 °F (36.6 °C) (Oral)   Resp 20   Wt 239 lb (108.4 kg)   BMI 31.53 kg/m²       Impression/Plan:  1. Contact dermatitis, unspecified contact dermatitis type, unspecified trigger      Requested Prescriptions     Signed Prescriptions Disp Refills    predniSONE (DELTASONE) 10 MG tablet 30 tablet 0     Si po qd for 3 days, then 3 po qd for 3 days, then 2 po qd for 3 days, then 1 po qd for 3 days    fexofenadine (ALLEGRA) 180 MG tablet 30 tablet 0     Sig: Take 1 tablet by mouth daily    mometasone (ELOCON) 0.1 % cream 45 g 1     Sig: Apply topically 2x daily. No orders of the defined types were placed in this encounter. Patient giveneducational materials - see patient instructions. Discussed use, benefit, and side effects of prescribed medications. All patient questions answered.   Pt voiced understanding. Reviewed health maintenance. Patient agreedwith treatment plan. Follow up as directed. **This report has been created using voice recognition software. It may contain minor errorswhich are inherent in voice recognition technology. **       Electronically signed by Nando Wynn MD on 6/17/2019 at 1:40 PM

## 2019-06-17 NOTE — PROGRESS NOTES
[address] [address]  Dept: [phone]  Dept Fax: [phone]  Loc: [phone]  PROGRESS NOTE      VisitDate: 6/14/2019    Delvis Garcia is a 75 y.o. male who presents today for:     Chief Complaint   Patient presents with    Rash     x3days, worsening, all over, thinks it might be poision ivy, has been using calamine lotion, itchy on wrists    3 Month Follow-Up     xanax refill, set to print per req--originally scheduled 6/20/19         Subjective:  HPI  Follow-up anxiety stable doing well with current medications. Patient's had a rash for 3 days spreading itchy no response to over-the-counter medicines, no new contacts that he is aware of    Review of Systems   Constitutional: Negative for appetite change and fever. HENT: Negative for congestion, ear pain, postnasal drip, rhinorrhea, sinus pressure and sore throat. Eyes: Negative for pain and visual disturbance. Respiratory: Negative for cough and shortness of breath. Cardiovascular: Negative for chest pain. Gastrointestinal: Negative for abdominal distention, abdominal pain, constipation, diarrhea and nausea. Genitourinary: Negative for dysuria, frequency and urgency. Musculoskeletal: Negative for arthralgias. Skin: Positive for rash. Neurological: Negative for dizziness.      Past Medical History:   Diagnosis Date    Arthritis     CAD (coronary artery disease)     GERD (gastroesophageal reflux disease)     Hyperglycemia 3/18/2016    Hyperlipidemia     Hypertension     IBS (irritable bowel syndrome)     Kidney stones     Malignant neoplasm of trigone of urinary bladder (HCC)     Obstructive sleep apnea     wears cpap    Prostatitis       Past Surgical History:   Procedure Laterality Date    ABDOMEN SURGERY      BACK SURGERY  07/08/2013    Lumbar Coyle L2-S-1, revision, PLIF L5-S-1 w/ grafting    CARDIAC CATHETERIZATION  9/2007    COLON SURGERY      Dr. Alice Patrick  ? Dr. Wil Craig  2007    DILATATION, ESOPHAGUS      ENDOSCOPY, COLON, DIAGNOSTIC      HERNIA REPAIR      Dr. Venkatesh Traore Right 3/25/2014    right total hip    OTHER SURGICAL HISTORY  2016    TURBT by Dr Ayaka Callaway     Family History   Problem Relation Age of Onset    Heart Disease Mother     Diabetes Mother     Kidney Disease Father     Cancer Brother         colon     Social History     Tobacco Use    Smoking status: Former Smoker     Packs/day: 1.00     Years: 25.00     Pack years: 25.00     Last attempt to quit: 1995     Years since quittin.4    Smokeless tobacco: Former User   Substance Use Topics    Alcohol use: Yes     Alcohol/week: 0.6 oz     Types: 1 Cans of beer per week     Comment: occas      Current Outpatient Medications   Medication Sig Dispense Refill    ALPRAZolam (XANAX) 0.25 MG tablet TAKE 1 TABLET BY MOUTH NIGHTLY AS NEEDED FOR SLEEP  2    ALPRAZolam (XANAX) 0.25 MG tablet Take 1 tablet by mouth nightly as needed for Sleep for up to 30 days.  30 tablet 2    lisinopril (PRINIVIL;ZESTRIL) 10 MG tablet Take 1 tablet by mouth daily 90 tablet 3    omeprazole (PRILOSEC) 20 MG delayed release capsule Take 1 capsule by mouth daily 90 capsule 3    blood glucose monitor strips True Metrix Test Strips, test QD Diagnosis:E11.9 100 strip 3    Blood Glucose Calibration (CONTROL) Normal SOLN True Metrix Control Solution, Diagnosis:E11.9 1 each 3    hyoscyamine (NULEV) 125 MCG TBDP dispersible tablet Take 1 tablet by mouth every 4 hours as needed (abd pain) 360 tablet 3    tamsulosin (FLOMAX) 0.4 MG capsule Take 2 capsules by mouth nightly 180 capsule 3    NONFORMULARY Take 450 mg by mouth 2 times daily      atorvastatin (LIPITOR) 40 MG tablet Take 1 tablet by mouth nightly 90 tablet 3    dicyclomine (BENTYL) 20 MG tablet Take 1 tablet by mouth Potassium monitoring  03/14/2020    Creatinine monitoring  03/14/2020    Lipid screen  03/14/2024    Colon cancer screen colonoscopy  11/13/2027    Flu vaccine  Completed    Pneumococcal 65+ years Vaccine  Completed    AAA screen  Completed         Objective:     Physical Exam   Constitutional: He is oriented to person, place, and time. He appears well-developed and well-nourished. No distress. HENT:   Head: Normocephalic and atraumatic. Right Ear: External ear normal.   Left Ear: External ear normal.   Eyes: Conjunctivae are normal.   Neck: No JVD present. Cardiovascular: Normal rate, regular rhythm and normal heart sounds. Pulmonary/Chest: Effort normal and breath sounds normal. He has no wheezes. He has no rales. Musculoskeletal: He exhibits no edema or tenderness. Neurological: He is alert and oriented to person, place, and time. Skin: Skin is warm and dry. Rash noted. He is not diaphoretic. No pallor. /70 (Site: Left Upper Arm)   Pulse 66   Temp 98.6 °F (37 °C) (Oral)   Resp 16   Wt 240 lb 6.4 oz (109 kg)   BMI 31.72 kg/m²       Impression/Plan:  1. Contact dermatitis, unspecified contact dermatitis type, unspecified trigger    2. Anxiety      Requested Prescriptions     Signed Prescriptions Disp Refills    ALPRAZolam (XANAX) 0.25 MG tablet 30 tablet 2     Sig: Take 1 tablet by mouth nightly as needed for Sleep for up to 30 days. No orders of the defined types were placed in this encounter. Methylprednisolone 160 IM    Patient giveneducational materials - see patient instructions. Discussed use, benefit, and side effects of prescribed medications. All patient questions answered. Pt voiced understanding. Reviewed health maintenance. Patient agreedwith treatment plan. Follow up as directed. **This report has been created using voice recognition software. It may contain minor errorswhich are inherent in voice recognition technology. **       Electronically signed by Vishal Benitez MD on 6/16/2019 at 10:01 PM

## 2019-08-26 ENCOUNTER — HOSPITAL ENCOUNTER (OUTPATIENT)
Age: 74
Discharge: HOME | End: 2019-08-26
Payer: MEDICARE

## 2019-08-26 ENCOUNTER — HOSPITAL ENCOUNTER (OUTPATIENT)
Dept: GENERAL RADIOLOGY | Age: 74
Discharge: HOME | End: 2019-08-26
Payer: MEDICARE

## 2019-08-26 ENCOUNTER — HOSPITAL ENCOUNTER (OUTPATIENT)
Dept: PULMONOLOGY | Age: 74
Discharge: HOME | End: 2019-08-26
Payer: MEDICARE

## 2019-08-26 DIAGNOSIS — R06.02 SOB (SHORTNESS OF BREATH): ICD-10-CM

## 2019-08-26 PROCEDURE — 94060 EVALUATION OF WHEEZING: CPT

## 2019-08-26 PROCEDURE — 94729 DIFFUSING CAPACITY: CPT

## 2019-08-26 PROCEDURE — 94726 PLETHYSMOGRAPHY LUNG VOLUMES: CPT

## 2019-08-26 PROCEDURE — 71046 X-RAY EXAM CHEST 2 VIEWS: CPT

## 2019-09-17 ENCOUNTER — OFFICE VISIT (OUTPATIENT)
Dept: FAMILY MEDICINE CLINIC | Age: 74
End: 2019-09-17
Payer: MEDICARE

## 2019-09-17 VITALS
BODY MASS INDEX: 30.34 KG/M2 | RESPIRATION RATE: 16 BRPM | HEART RATE: 63 BPM | SYSTOLIC BLOOD PRESSURE: 112 MMHG | DIASTOLIC BLOOD PRESSURE: 72 MMHG | TEMPERATURE: 98.7 F | WEIGHT: 230 LBS

## 2019-09-17 DIAGNOSIS — S90.31XA CONTUSION OF RIGHT FOOT, INITIAL ENCOUNTER: Primary | ICD-10-CM

## 2019-09-17 DIAGNOSIS — I10 ESSENTIAL HYPERTENSION, BENIGN: ICD-10-CM

## 2019-09-17 DIAGNOSIS — F41.9 ANXIETY: ICD-10-CM

## 2019-09-17 PROBLEM — J44.9 CHRONIC OBSTRUCTIVE PULMONARY DISEASE (HCC): Status: ACTIVE | Noted: 2019-09-17

## 2019-09-17 PROCEDURE — 4040F PNEUMOC VAC/ADMIN/RCVD: CPT | Performed by: FAMILY MEDICINE

## 2019-09-17 PROCEDURE — 3017F COLORECTAL CA SCREEN DOC REV: CPT | Performed by: FAMILY MEDICINE

## 2019-09-17 PROCEDURE — 99213 OFFICE O/P EST LOW 20 MIN: CPT | Performed by: FAMILY MEDICINE

## 2019-09-17 PROCEDURE — G8417 CALC BMI ABV UP PARAM F/U: HCPCS | Performed by: FAMILY MEDICINE

## 2019-09-17 PROCEDURE — 1036F TOBACCO NON-USER: CPT | Performed by: FAMILY MEDICINE

## 2019-09-17 PROCEDURE — 1123F ACP DISCUSS/DSCN MKR DOCD: CPT | Performed by: FAMILY MEDICINE

## 2019-09-17 PROCEDURE — G8427 DOCREV CUR MEDS BY ELIG CLIN: HCPCS | Performed by: FAMILY MEDICINE

## 2019-09-17 RX ORDER — ATORVASTATIN CALCIUM 40 MG/1
40 TABLET, FILM COATED ORAL NIGHTLY
Qty: 90 TABLET | Refills: 3 | Status: SHIPPED | OUTPATIENT
Start: 2019-09-17 | End: 2019-10-20 | Stop reason: SDUPTHER

## 2019-09-17 RX ORDER — DICYCLOMINE HCL 20 MG
20 TABLET ORAL 2 TIMES DAILY
Qty: 180 TABLET | Refills: 3 | Status: SHIPPED | OUTPATIENT
Start: 2019-09-17 | End: 2019-10-20 | Stop reason: SDUPTHER

## 2019-09-17 RX ORDER — ALPRAZOLAM 0.25 MG/1
TABLET ORAL
Qty: 30 TABLET | Refills: 2 | Status: SHIPPED | OUTPATIENT
Start: 2019-09-17 | End: 2019-12-17 | Stop reason: SDUPTHER

## 2019-09-23 ASSESSMENT — ENCOUNTER SYMPTOMS
EYE PAIN: 0
SHORTNESS OF BREATH: 0
DIARRHEA: 0
COUGH: 0
SINUS PRESSURE: 0
RHINORRHEA: 0
ABDOMINAL DISTENTION: 0
SORE THROAT: 0
CONSTIPATION: 0
ABDOMINAL PAIN: 0
NAUSEA: 0

## 2019-09-23 NOTE — PROGRESS NOTES
[address] [place] [facility] [address]  Dept: [phone]  Dept Fax: [phone]  Loc: [phone]  PROGRESS NOTE      VisitDate: 9/17/2019    Ezekiel Vu is a 75 y.o. male who presents today for:     Chief Complaint   Patient presents with    3 Month Follow-Up    Injury     right foot pain, caught it under a door. Subjective:  HPI  Comes in having right foot pain clotted under a door is been bruised and swollen hurts when he walks. Follow-up hypertension stable follow-up anxiety stable    Review of Systems   Constitutional: Negative for appetite change and fever. HENT: Negative for congestion, ear pain, postnasal drip, rhinorrhea, sinus pressure and sore throat. Eyes: Negative for pain and visual disturbance. Respiratory: Negative for cough and shortness of breath. Cardiovascular: Negative for chest pain. Gastrointestinal: Negative for abdominal distention, abdominal pain, constipation, diarrhea and nausea. Genitourinary: Negative for dysuria, frequency and urgency. Musculoskeletal: Positive for arthralgias. Skin: Negative for rash. Neurological: Negative for dizziness. Past Medical History:   Diagnosis Date    Arthritis     CAD (coronary artery disease)     Chronic obstructive pulmonary disease ([facility] [address] 9/17/2019    GERD (gastroesophageal reflux disease)     Hyperglycemia 3/18/2016    Hyperlipidemia     Hypertension     IBS (irritable bowel syndrome)     Kidney stones     Malignant neoplasm of trigone of urinary bladder (HCC)     Obstructive sleep apnea     wears cpap    Prostatitis       Past Surgical History:   Procedure Laterality Date    ABDOMEN SURGERY      BACK SURGERY  07/08/2013    Lumbar Coyle L2-S-1, revision, PLIF L5-S-1 w/ grafting    CARDIAC CATHETERIZATION  9/2007    COLON SURGERY  2008    Dr. George Hodges  2011?     Dr. Harrison Brush

## 2019-10-10 ENCOUNTER — PROCEDURE VISIT (OUTPATIENT)
Dept: UROLOGY | Age: 74
End: 2019-10-10
Payer: MEDICARE

## 2019-10-10 ENCOUNTER — NURSE ONLY (OUTPATIENT)
Dept: FAMILY MEDICINE CLINIC | Age: 74
End: 2019-10-10
Payer: MEDICARE

## 2019-10-10 VITALS
WEIGHT: 227 LBS | BODY MASS INDEX: 30.75 KG/M2 | DIASTOLIC BLOOD PRESSURE: 68 MMHG | SYSTOLIC BLOOD PRESSURE: 126 MMHG | HEIGHT: 72 IN

## 2019-10-10 DIAGNOSIS — C67.0 MALIGNANT NEOPLASM OF TRIGONE OF URINARY BLADDER (HCC): Primary | ICD-10-CM

## 2019-10-10 LAB
BILIRUBIN URINE: NEGATIVE
BLOOD URINE, POC: NEGATIVE
CHARACTER, URINE: CLEAR
COLOR, URINE: YELLOW
GLUCOSE URINE: NEGATIVE MG/DL
KETONES, URINE: NEGATIVE
LEUKOCYTE CLUMPS, URINE: ABNORMAL
NITRITE, URINE: NEGATIVE
PH, URINE: 5.5 (ref 5–9)
PROTEIN, URINE: NEGATIVE MG/DL
SPECIFIC GRAVITY, URINE: 1.02 (ref 1–1.03)
UROBILINOGEN, URINE: 0.2 EU/DL (ref 0–1)

## 2019-10-10 PROCEDURE — 3017F COLORECTAL CA SCREEN DOC REV: CPT | Performed by: UROLOGY

## 2019-10-10 PROCEDURE — 1036F TOBACCO NON-USER: CPT | Performed by: UROLOGY

## 2019-10-10 PROCEDURE — 4040F PNEUMOC VAC/ADMIN/RCVD: CPT | Performed by: UROLOGY

## 2019-10-10 PROCEDURE — G0008 ADMIN INFLUENZA VIRUS VAC: HCPCS | Performed by: FAMILY MEDICINE

## 2019-10-10 PROCEDURE — G8417 CALC BMI ABV UP PARAM F/U: HCPCS | Performed by: UROLOGY

## 2019-10-10 PROCEDURE — 99213 OFFICE O/P EST LOW 20 MIN: CPT | Performed by: UROLOGY

## 2019-10-10 PROCEDURE — G8484 FLU IMMUNIZE NO ADMIN: HCPCS | Performed by: UROLOGY

## 2019-10-10 PROCEDURE — 1123F ACP DISCUSS/DSCN MKR DOCD: CPT | Performed by: UROLOGY

## 2019-10-10 PROCEDURE — 81003 URINALYSIS AUTO W/O SCOPE: CPT | Performed by: UROLOGY

## 2019-10-10 PROCEDURE — 90653 IIV ADJUVANT VACCINE IM: CPT | Performed by: FAMILY MEDICINE

## 2019-10-10 PROCEDURE — G8427 DOCREV CUR MEDS BY ELIG CLIN: HCPCS | Performed by: UROLOGY

## 2019-10-21 RX ORDER — DICYCLOMINE HCL 20 MG
20 TABLET ORAL 2 TIMES DAILY
Qty: 180 TABLET | Refills: 3 | Status: SHIPPED | OUTPATIENT
Start: 2019-10-21 | End: 2020-10-23 | Stop reason: SDUPTHER

## 2019-10-21 RX ORDER — ATORVASTATIN CALCIUM 40 MG/1
40 TABLET, FILM COATED ORAL NIGHTLY
Qty: 90 TABLET | Refills: 3 | Status: SHIPPED | OUTPATIENT
Start: 2019-10-21 | End: 2020-10-23 | Stop reason: SDUPTHER

## 2019-12-17 ENCOUNTER — OFFICE VISIT (OUTPATIENT)
Dept: FAMILY MEDICINE CLINIC | Age: 74
End: 2019-12-17
Payer: MEDICARE

## 2019-12-17 VITALS
BODY MASS INDEX: 31.48 KG/M2 | RESPIRATION RATE: 12 BRPM | HEIGHT: 72 IN | TEMPERATURE: 98 F | HEART RATE: 59 BPM | SYSTOLIC BLOOD PRESSURE: 108 MMHG | WEIGHT: 232.4 LBS | DIASTOLIC BLOOD PRESSURE: 62 MMHG

## 2019-12-17 DIAGNOSIS — M77.8 RIGHT SHOULDER TENDONITIS: ICD-10-CM

## 2019-12-17 DIAGNOSIS — N40.1 BPH WITH OBSTRUCTION/LOWER URINARY TRACT SYMPTOMS: ICD-10-CM

## 2019-12-17 DIAGNOSIS — E78.5 HYPERLIPIDEMIA, UNSPECIFIED HYPERLIPIDEMIA TYPE: ICD-10-CM

## 2019-12-17 DIAGNOSIS — M79.671 RIGHT FOOT PAIN: ICD-10-CM

## 2019-12-17 DIAGNOSIS — R73.9 HYPERGLYCEMIA: Chronic | ICD-10-CM

## 2019-12-17 DIAGNOSIS — N13.8 BPH WITH OBSTRUCTION/LOWER URINARY TRACT SYMPTOMS: ICD-10-CM

## 2019-12-17 DIAGNOSIS — I10 ESSENTIAL HYPERTENSION, BENIGN: Primary | ICD-10-CM

## 2019-12-17 DIAGNOSIS — F41.9 ANXIETY: ICD-10-CM

## 2019-12-17 DIAGNOSIS — C67.4 MALIGNANT NEOPLASM OF POSTERIOR WALL OF URINARY BLADDER (HCC): ICD-10-CM

## 2019-12-17 PROCEDURE — G8482 FLU IMMUNIZE ORDER/ADMIN: HCPCS | Performed by: FAMILY MEDICINE

## 2019-12-17 PROCEDURE — 99214 OFFICE O/P EST MOD 30 MIN: CPT | Performed by: FAMILY MEDICINE

## 2019-12-17 PROCEDURE — G8417 CALC BMI ABV UP PARAM F/U: HCPCS | Performed by: FAMILY MEDICINE

## 2019-12-17 PROCEDURE — 3017F COLORECTAL CA SCREEN DOC REV: CPT | Performed by: FAMILY MEDICINE

## 2019-12-17 PROCEDURE — 1123F ACP DISCUSS/DSCN MKR DOCD: CPT | Performed by: FAMILY MEDICINE

## 2019-12-17 PROCEDURE — G8427 DOCREV CUR MEDS BY ELIG CLIN: HCPCS | Performed by: FAMILY MEDICINE

## 2019-12-17 PROCEDURE — 96372 THER/PROPH/DIAG INJ SC/IM: CPT | Performed by: FAMILY MEDICINE

## 2019-12-17 PROCEDURE — 4040F PNEUMOC VAC/ADMIN/RCVD: CPT | Performed by: FAMILY MEDICINE

## 2019-12-17 PROCEDURE — 1036F TOBACCO NON-USER: CPT | Performed by: FAMILY MEDICINE

## 2019-12-17 RX ORDER — METHYLPREDNISOLONE ACETATE 80 MG/ML
160 INJECTION, SUSPENSION INTRA-ARTICULAR; INTRALESIONAL; INTRAMUSCULAR; SOFT TISSUE ONCE
Status: COMPLETED | OUTPATIENT
Start: 2019-12-17 | End: 2019-12-17

## 2019-12-17 RX ORDER — TAMSULOSIN HYDROCHLORIDE 0.4 MG/1
0.8 CAPSULE ORAL NIGHTLY
Qty: 180 CAPSULE | Refills: 3 | Status: SHIPPED | OUTPATIENT
Start: 2019-12-17 | End: 2020-12-08 | Stop reason: SDUPTHER

## 2019-12-17 RX ORDER — ALPRAZOLAM 0.25 MG/1
TABLET ORAL
Qty: 30 TABLET | Refills: 2 | Status: SHIPPED | OUTPATIENT
Start: 2019-12-17 | End: 2020-03-17

## 2019-12-17 RX ADMIN — METHYLPREDNISOLONE ACETATE 160 MG: 80 INJECTION, SUSPENSION INTRA-ARTICULAR; INTRALESIONAL; INTRAMUSCULAR; SOFT TISSUE at 14:16

## 2019-12-22 ASSESSMENT — ENCOUNTER SYMPTOMS
COUGH: 0
CONSTIPATION: 0
EYE PAIN: 0
RHINORRHEA: 0
SHORTNESS OF BREATH: 0
NAUSEA: 0
SORE THROAT: 0
SINUS PRESSURE: 0
DIARRHEA: 0
ABDOMINAL PAIN: 0
ABDOMINAL DISTENTION: 0

## 2020-01-27 ENCOUNTER — TELEPHONE (OUTPATIENT)
Dept: UROLOGY | Age: 75
End: 2020-01-27

## 2020-01-28 ENCOUNTER — OFFICE VISIT (OUTPATIENT)
Dept: PULMONOLOGY | Age: 75
End: 2020-01-28
Payer: MEDICARE

## 2020-01-28 VITALS
OXYGEN SATURATION: 97 % | DIASTOLIC BLOOD PRESSURE: 78 MMHG | BODY MASS INDEX: 31.56 KG/M2 | WEIGHT: 233 LBS | HEIGHT: 72 IN | HEART RATE: 60 BPM | SYSTOLIC BLOOD PRESSURE: 136 MMHG

## 2020-01-28 PROCEDURE — 1036F TOBACCO NON-USER: CPT | Performed by: PHYSICIAN ASSISTANT

## 2020-01-28 PROCEDURE — 4040F PNEUMOC VAC/ADMIN/RCVD: CPT | Performed by: PHYSICIAN ASSISTANT

## 2020-01-28 PROCEDURE — G8417 CALC BMI ABV UP PARAM F/U: HCPCS | Performed by: PHYSICIAN ASSISTANT

## 2020-01-28 PROCEDURE — 3017F COLORECTAL CA SCREEN DOC REV: CPT | Performed by: PHYSICIAN ASSISTANT

## 2020-01-28 PROCEDURE — G8482 FLU IMMUNIZE ORDER/ADMIN: HCPCS | Performed by: PHYSICIAN ASSISTANT

## 2020-01-28 PROCEDURE — G8427 DOCREV CUR MEDS BY ELIG CLIN: HCPCS | Performed by: PHYSICIAN ASSISTANT

## 2020-01-28 PROCEDURE — 99213 OFFICE O/P EST LOW 20 MIN: CPT | Performed by: PHYSICIAN ASSISTANT

## 2020-01-28 PROCEDURE — 1123F ACP DISCUSS/DSCN MKR DOCD: CPT | Performed by: PHYSICIAN ASSISTANT

## 2020-01-28 ASSESSMENT — ENCOUNTER SYMPTOMS
CHEST TIGHTNESS: 0
WHEEZING: 0
DIARRHEA: 0
SHORTNESS OF BREATH: 0
NAUSEA: 0
COUGH: 0
STRIDOR: 0
EYES NEGATIVE: 1
BACK PAIN: 0
ALLERGIC/IMMUNOLOGIC NEGATIVE: 1

## 2020-01-28 NOTE — PROGRESS NOTES
[facility]      Eren Conway         [id number]  1/28/2020   Chief Complaint   Patient presents with    Follow-up     ANTWON 1 yr f/u, no download        Pt of Dr. Andres Diaz    PAP Download:   Rubin Fiddler or initial AHI: 13.4     Date of initial study: 9/12/05          Neck Size: 16.5  Mallampati Mallampati 4  ESS:  3  SAQLI:     Here is a scan of the most recent download:  No download- unable to get    Presentation:   Chapis presents for sleep medicine follow up for obstructive sleep apnea  Since the last visit, Chapis is wearing PAP according to him. His machine is not downloading. The info off the PAP states only using 1 hour at night but he claims at least 4-5 hours. He feels rested and sleeps well. He wakes several times at night to urinate. Equipment issues: The pressure is  acceptable, the mask is acceptable     Sleep issues:  Do you feel better? Yes  More rested? Yes   Better concentration? yes    Progress History:   Since last visit any new medical issues? No  New ER or hospitlal visits? No  Any new or changes in medicines? No  Any new sleep medicines? No        Past Medical History:   Diagnosis Date    Arthritis     CAD (coronary artery disease)     Chronic obstructive pulmonary disease ([facility] [address] 9/17/2019    GERD (gastroesophageal reflux disease)     Hyperglycemia 3/18/2016    Hyperlipidemia     Hypertension     IBS (irritable bowel syndrome)     Kidney stones     Malignant neoplasm of trigone of urinary bladder (HCC)     Obstructive sleep apnea     wears cpap    Prostatitis        Past Surgical History:   Procedure Laterality Date    ABDOMEN SURGERY      BACK SURGERY  07/08/2013    Lumbar Coyle L2-S-1, revision, PLIF L5-S-1 w/ grafting    CARDIAC CATHETERIZATION  9/2007    COLON SURGERY  2008    Dr. Katy Low  2011?     Dr. Myriam Miller  2007    DILATATION, ESOPHAGUS      ENDOSCOPY, [employer] True Metrix Test strips Test BS's every two weeks as directed Dx: R73.9 50 strip 3    Cinnamon 500 MG CAPS Take 500 mg by mouth Daily      Milk Thistle 1000 MG CAPS Take 1,000 mg by mouth Daily      Lancets MISC TruePlus Super Thin 28G lancets Test BS's every 2 weeks as directed Dx: R73.9 50 each 11    Saw Palmetto 500 MG CAPS Take 1 tablet by mouth 2 times daily.  Ubiquinol 100 MG CAPS Take 1 tablet by mouth daily.  Bilberry 100 MG CAPS Take 100 mg by mouth Daily       fish oil-omega-3 fatty acids 1000 MG capsule Take 1 g by mouth daily.  aspirin 81 MG EC tablet Take 81 mg by mouth daily.  Chromium Picolinate 500 MCG TABS Take 500 mg by mouth daily.  Vitamin E 400 UNIT TABS Take 400 mg by mouth daily.  Ascorbic Acid (VITAMIN C CR) 500 MG TBCR Take 500 mg by mouth daily.  Methylsulfonylmethane (MSM) 1500 MG TABS Take 1,500 mg by mouth daily.  Lycopene 10 MG CAPS Take 20 mg by mouth daily.  Multiple Vitamin (MULTIVITAMIN PO) Take 1 tablet by mouth daily.  Cholecalciferol (VITAMIN D3) 2000 UNIT TABS Take 2,000 mg by mouth daily.  Pyridoxine HCl (VITAMIN B-6) 50 MG tablet Take 50 mg by mouth 2 times daily.  Vitamins-Lipotropics (BALANCED B-50 COMPLEX PO) Take  by mouth 2 times daily. No current facility-administered medications for this visit. Family History   Problem Relation Age of Onset    Heart Disease Mother     Diabetes Mother     Kidney Disease Father     Cancer Brother         colon        Review of Systems -   Review of Systems   Constitutional: Negative for activity change, appetite change, chills and fever. HENT: Negative for congestion and postnasal drip. Eyes: Negative. Respiratory: Negative for cough, chest tightness, shortness of breath, wheezing and stridor. Cardiovascular: Negative for chest pain and leg swelling. Gastrointestinal: Negative for diarrhea and nausea.    Endocrine:

## 2020-01-29 LAB
BILIRUBIN URINE: ABNORMAL
BLOOD, URINE: ABNORMAL
CLARITY: ABNORMAL
COLOR: YELLOW
GLUCOSE URINE: NEGATIVE
KETONES, URINE: NEGATIVE
LEUKOCYTE ESTERASE, URINE: ABNORMAL
NITRITE, URINE: NEGATIVE
PH UA: 5.5 (ref 4.5–8)
PROTEIN UA: ABNORMAL
SPECIFIC GRAVITY UA: 1.02 (ref 1–1.03)
UROBILINOGEN, URINE: ABNORMAL

## 2020-02-18 ENCOUNTER — PROCEDURE VISIT (OUTPATIENT)
Dept: UROLOGY | Age: 75
End: 2020-02-18
Payer: MEDICARE

## 2020-02-18 ENCOUNTER — TELEPHONE (OUTPATIENT)
Dept: UROLOGY | Age: 75
End: 2020-02-18

## 2020-02-18 ENCOUNTER — PREP FOR PROCEDURE (OUTPATIENT)
Dept: UROLOGY | Age: 75
End: 2020-02-18

## 2020-02-18 VITALS
DIASTOLIC BLOOD PRESSURE: 62 MMHG | WEIGHT: 236.5 LBS | BODY MASS INDEX: 32.03 KG/M2 | HEIGHT: 72 IN | SYSTOLIC BLOOD PRESSURE: 130 MMHG

## 2020-02-18 LAB
ANION GAP SERPL CALCULATED.3IONS-SCNC: 8 MMOL/L (ref 5–15)
BILIRUBIN URINE: NEGATIVE
BLOOD URINE, POC: ABNORMAL
BUN BLDV-MCNC: 20 MG/DL (ref 5–27)
CALCIUM SERPL-MCNC: 9.6 MG/DL (ref 8.5–10.5)
CHARACTER, URINE: CLEAR
CHLORIDE BLD-SCNC: 102 MMOL/L (ref 98–109)
CO2: 29 MMOL/L (ref 22–32)
COLOR, URINE: YELLOW
CREAT SERPL-MCNC: 1.02 MG/DL (ref 0.6–1.3)
EGFR AFRICAN AMERICAN: >60 ML/MIN/1.73SQ.M
EGFR IF NONAFRICAN AMERICAN: >60 ML/MIN/1.73SQ.M
GLUCOSE URINE: NEGATIVE MG/DL
GLUCOSE: 103 MG/DL (ref 65–99)
KETONES, URINE: NEGATIVE
LEUKOCYTE CLUMPS, URINE: ABNORMAL
NITRITE, URINE: NEGATIVE
PH, URINE: 5.5 (ref 5–9)
POTASSIUM SERPL-SCNC: 4.4 MMOL/L (ref 3.5–5)
PROTEIN, URINE: NEGATIVE MG/DL
SODIUM BLD-SCNC: 139 MMOL/L (ref 134–146)
SPECIFIC GRAVITY, URINE: 1.02 (ref 1–1.03)
UROBILINOGEN, URINE: 0.2 EU/DL (ref 0–1)

## 2020-02-18 PROCEDURE — G8417 CALC BMI ABV UP PARAM F/U: HCPCS | Performed by: UROLOGY

## 2020-02-18 PROCEDURE — 3017F COLORECTAL CA SCREEN DOC REV: CPT | Performed by: UROLOGY

## 2020-02-18 PROCEDURE — 52000 CYSTOURETHROSCOPY: CPT | Performed by: UROLOGY

## 2020-02-18 PROCEDURE — 1036F TOBACCO NON-USER: CPT | Performed by: UROLOGY

## 2020-02-18 PROCEDURE — 1123F ACP DISCUSS/DSCN MKR DOCD: CPT | Performed by: UROLOGY

## 2020-02-18 PROCEDURE — 81003 URINALYSIS AUTO W/O SCOPE: CPT | Performed by: UROLOGY

## 2020-02-18 PROCEDURE — 99213 OFFICE O/P EST LOW 20 MIN: CPT | Performed by: UROLOGY

## 2020-02-18 PROCEDURE — 4040F PNEUMOC VAC/ADMIN/RCVD: CPT | Performed by: UROLOGY

## 2020-02-18 PROCEDURE — G8427 DOCREV CUR MEDS BY ELIG CLIN: HCPCS | Performed by: UROLOGY

## 2020-02-18 PROCEDURE — G8482 FLU IMMUNIZE ORDER/ADMIN: HCPCS | Performed by: UROLOGY

## 2020-02-18 RX ORDER — SODIUM CHLORIDE 9 MG/ML
INJECTION, SOLUTION INTRAVENOUS CONTINUOUS
Status: CANCELLED | OUTPATIENT
Start: 2020-02-28

## 2020-02-18 NOTE — TELEPHONE ENCOUNTER
SURGERY [address]  [address] [place] [address] [address] CARRIE KURTZ AM [place] [address], [address]      Phone *[phone] *[phone]   Surgical Scheduling Direct Line Phone *[phone] Fax *[phone]      Timi Tate 1945 male    [facility] [place] [address]   Marital Status:          Home Phone: [phone]      Cell Phone:    Telephone Information:   Mobile [phone]          Surgeon: Dr. Jonah Velazquez  Surgery Date: 2/28/20   Time: 10:30 am    Procedure: Transurethral resection of bladder tumor    Diagnosis: Bladder  Tumor    Important Medical History: In [facility]    Special Inst/Equip:     CPT Codes:    98586  Latex Allergy:  No     Cardiac Device:  No     Anesthesia:  Anesthesiologist (General/Spinal)          Admission Type:  Same Day                             Admit Prior to Day of Surgery: No    Case Location:  Main OR           Preadmission Testing:Phone Call              PAT Date and Time:______________________________________________________    PAT Confirmation #: ______________________________________________________    Post Op Visit: ___________________________________________________________    Need Preop Cardiac Clearance: Yes    Does Patient have Cardiologist/physician?      Dr. Lauren Carrasco Confirmation #: __________________________________________________    Charles Keshia: ________________________   Date: __________________________     [employer] Name: [facility]

## 2020-02-18 NOTE — PROGRESS NOTES
10 ml of 2% Lidocaine Urojet inserted into the penis and clamped for 5 minutes before cystoscopy procedure.

## 2020-02-18 NOTE — TELEPHONE ENCOUNTER
Patient scheduled for surgery with Dr Winsome Arroyo for 2/28/20. Surgery consent signed. Patient give BMP to do. Patient does not need any pre op testing. Patient will need clearance from Dr Michael Ayala. Will obtain recent EKG from Dr. Michael Ayala. Patient is to be off his aspirin for 10 days per Dr Winsome Arroyo. Dr Winsome Arroyo instructed the patient to stop the aspirin today. Surgery instructions gone over verbally with the patient. Patient voiced understanding. CT Scan scheduled for 2/25/20 at [facility] arrival of 3:30 pm for 4:00 pm scan. NPO 4 hours prior. Patient was given the order.

## 2020-02-19 ENCOUNTER — TELEPHONE (OUTPATIENT)
Dept: UROLOGY | Age: 75
End: 2020-02-19

## 2020-02-21 ENCOUNTER — OFFICE VISIT (OUTPATIENT)
Dept: FAMILY MEDICINE CLINIC | Age: 75
End: 2020-02-21
Payer: MEDICARE

## 2020-02-21 VITALS
RESPIRATION RATE: 16 BRPM | OXYGEN SATURATION: 99 % | DIASTOLIC BLOOD PRESSURE: 84 MMHG | WEIGHT: 229 LBS | SYSTOLIC BLOOD PRESSURE: 126 MMHG | HEART RATE: 72 BPM | BODY MASS INDEX: 32.78 KG/M2 | TEMPERATURE: 98.6 F | HEIGHT: 70 IN

## 2020-02-21 PROCEDURE — G8417 CALC BMI ABV UP PARAM F/U: HCPCS | Performed by: FAMILY MEDICINE

## 2020-02-21 PROCEDURE — 1123F ACP DISCUSS/DSCN MKR DOCD: CPT | Performed by: FAMILY MEDICINE

## 2020-02-21 PROCEDURE — 3017F COLORECTAL CA SCREEN DOC REV: CPT | Performed by: FAMILY MEDICINE

## 2020-02-21 PROCEDURE — G8427 DOCREV CUR MEDS BY ELIG CLIN: HCPCS | Performed by: FAMILY MEDICINE

## 2020-02-21 PROCEDURE — 99213 OFFICE O/P EST LOW 20 MIN: CPT | Performed by: FAMILY MEDICINE

## 2020-02-21 PROCEDURE — G8482 FLU IMMUNIZE ORDER/ADMIN: HCPCS | Performed by: FAMILY MEDICINE

## 2020-02-21 PROCEDURE — 4040F PNEUMOC VAC/ADMIN/RCVD: CPT | Performed by: FAMILY MEDICINE

## 2020-02-21 PROCEDURE — 1036F TOBACCO NON-USER: CPT | Performed by: FAMILY MEDICINE

## 2020-02-21 RX ORDER — CEFUROXIME AXETIL 250 MG/1
250 TABLET ORAL 2 TIMES DAILY
Qty: 20 TABLET | Refills: 0 | Status: ON HOLD | OUTPATIENT
Start: 2020-02-21 | End: 2020-02-28

## 2020-02-21 RX ORDER — BENZONATATE 200 MG/1
200 CAPSULE ORAL 3 TIMES DAILY PRN
Qty: 30 CAPSULE | Refills: 0 | Status: ON HOLD | OUTPATIENT
Start: 2020-02-21 | End: 2020-02-28

## 2020-02-21 SDOH — ECONOMIC STABILITY: INCOME INSECURITY: HOW HARD IS IT FOR YOU TO PAY FOR THE VERY BASICS LIKE FOOD, HOUSING, MEDICAL CARE, AND HEATING?: NOT HARD AT ALL

## 2020-02-21 SDOH — ECONOMIC STABILITY: TRANSPORTATION INSECURITY
IN THE PAST 12 MONTHS, HAS THE LACK OF TRANSPORTATION KEPT YOU FROM MEDICAL APPOINTMENTS OR FROM GETTING MEDICATIONS?: NO

## 2020-02-21 SDOH — ECONOMIC STABILITY: TRANSPORTATION INSECURITY
IN THE PAST 12 MONTHS, HAS LACK OF TRANSPORTATION KEPT YOU FROM MEETINGS, WORK, OR FROM GETTING THINGS NEEDED FOR DAILY LIVING?: NO

## 2020-02-21 SDOH — ECONOMIC STABILITY: FOOD INSECURITY: WITHIN THE PAST 12 MONTHS, THE FOOD YOU BOUGHT JUST DIDN'T LAST AND YOU DIDN'T HAVE MONEY TO GET MORE.: NEVER TRUE

## 2020-02-21 SDOH — ECONOMIC STABILITY: FOOD INSECURITY: WITHIN THE PAST 12 MONTHS, YOU WORRIED THAT YOUR FOOD WOULD RUN OUT BEFORE YOU GOT MONEY TO BUY MORE.: NEVER TRUE

## 2020-02-21 ASSESSMENT — PATIENT HEALTH QUESTIONNAIRE - PHQ9
SUM OF ALL RESPONSES TO PHQ9 QUESTIONS 1 & 2: 0
1. LITTLE INTEREST OR PLEASURE IN DOING THINGS: 0
SUM OF ALL RESPONSES TO PHQ QUESTIONS 1-9: 0
SUM OF ALL RESPONSES TO PHQ QUESTIONS 1-9: 0
2. FEELING DOWN, DEPRESSED OR HOPELESS: 0

## 2020-02-21 NOTE — PROGRESS NOTES
NPO after midnight  [employer] and drivers license  Wear comfortable clean clothing  Do not bring jewelry   Shower night before and morning of surgery with a liquid antibacterial soap  Bring medications in original bottles  Bring CPAP  Follow all instructions give by your physician   needed at discharge  Call [phone] for any questions

## 2020-02-21 NOTE — PROGRESS NOTES
PAT call attempted, patient unavailable, left message to please call us back at your earliest convenience; [phone]

## 2020-02-21 NOTE — PROGRESS NOTES
Attempted to leave message with Javid gAuilar at [facility] [place] [facility] Urology rg patient stating he went to [facility] office today and received oral antibiotics for bronchitis tx. Unable to leave message due to phones going directly to the on call service. Did  Informed pt he should call the office next week and discuss rescheduling sx if not feeling better.

## 2020-02-23 ASSESSMENT — ENCOUNTER SYMPTOMS
DIARRHEA: 0
CONSTIPATION: 0
SINUS PRESSURE: 0
EYE PAIN: 0
ABDOMINAL DISTENTION: 0
COUGH: 1
RHINORRHEA: 0
NAUSEA: 0
SHORTNESS OF BREATH: 0
SORE THROAT: 0
ABDOMINAL PAIN: 0

## 2020-02-24 NOTE — PROGRESS NOTES
[address] [place] [address]  Dept: [phone]  Dept Fax: [phone]  Loc: [phone]  PROGRESS NOTE      VisitDate: 2/21/2020    Za Reynolds is a 75 y.o. male who presents today for:     Chief Complaint   Patient presents with    Cough     chest pain  x3 days    Fatigue     x 3 days         Subjective:  HPI  Patient presents with several days of chest congestion and cough. No relief with over-the-counter medicines symptoms not improving. Also has upcoming procedure for possible recurrence of bladder cancer    Review of Systems   Constitutional: Negative for appetite change and fever. HENT: Positive for congestion. Negative for ear pain, postnasal drip, rhinorrhea, sinus pressure and sore throat. Eyes: Negative for pain and visual disturbance. Respiratory: Positive for cough. Negative for shortness of breath. Cardiovascular: Negative for chest pain. Gastrointestinal: Negative for abdominal distention, abdominal pain, constipation, diarrhea and nausea. Genitourinary: Negative for dysuria, frequency and urgency. Musculoskeletal: Negative for arthralgias. Skin: Negative for rash. Neurological: Negative for dizziness.      Past Medical History:   Diagnosis Date    Arthritis     CAD (coronary artery disease)     Chronic obstructive pulmonary disease ([facility] [address] 9/17/2019    GERD (gastroesophageal reflux disease)     Hyperglycemia 3/18/2016    Hyperlipidemia     Hypertension     IBS (irritable bowel syndrome)     Kidney stones     Malignant neoplasm of trigone of urinary bladder (HCC)     Obstructive sleep apnea     wears cpap    Prostatitis       Past Surgical History:   Procedure Laterality Date    ABDOMEN SURGERY      BACK SURGERY  07/08/2013    Lumbar Coyle L2-S-1, revision, PLIF L5-S-1 w/ grafting    CARDIAC CATHETERIZATION  9/2007    COLON SURGERY  2008    Dr. Nima Fernandez ?    Dr. Mariia Gauthier  2007    DILATATION, ESOPHAGUS      ENDOSCOPY, COLON, DIAGNOSTIC      HERNIA REPAIR      Dr. Shlomo Goss Right 3/25/2014    right total hip    OTHER SURGICAL HISTORY  2016    TURBT by Dr Stroud Found     Family History   Problem Relation Age of Onset    Heart Disease Mother     Diabetes Mother     Kidney Disease Father     Cancer Brother         colon     Social History     Tobacco Use    Smoking status: Former Smoker     Packs/day: 1.00     Years: 25.00     Pack years: 25.00     Last attempt to quit: 1995     Years since quittin.1    Smokeless tobacco: Former User   Substance Use Topics    Alcohol use:  Yes     Alcohol/week: 1.0 standard drinks     Types: 1 Cans of beer per week     Comment: occas      Current Outpatient Medications   Medication Sig Dispense Refill    cefUROXime (CEFTIN) 250 MG tablet Take 1 tablet by mouth 2 times daily for 10 days 20 tablet 0    benzonatate (TESSALON) 200 MG capsule Take 1 capsule by mouth 3 times daily as needed for Cough 30 capsule 0    ALPRAZolam (XANAX) 0.25 MG tablet TAKE 1 TABLET BY MOUTH NIGHTLY AS NEEDED FOR SLEEP 30 tablet 2    tamsulosin (FLOMAX) 0.4 MG capsule Take 2 capsules by mouth nightly 180 capsule 3    atorvastatin (LIPITOR) 40 MG tablet Take 1 tablet by mouth nightly 90 tablet 3    dicyclomine (BENTYL) 20 MG tablet Take 1 tablet by mouth 2 times daily 180 tablet 3    metoprolol tartrate (LOPRESSOR) 25 MG tablet Take 1 tablet by mouth daily 90 tablet 3    lisinopril (PRINIVIL;ZESTRIL) 10 MG tablet Take 1 tablet by mouth daily 90 tablet 3    omeprazole (PRILOSEC) 20 MG delayed release capsule Take 1 capsule by mouth daily 90 capsule 3    blood glucose monitor strips True Metrix Test Strips, test QD Diagnosis:E11.9 100 strip 3    Blood Glucose Calibration (CONTROL) Normal SOLN True Metrix Control Solution, Diagnosis:E11.9 1 each 3    hyoscyamine (NULEV) 125 MCG TBDP dispersible tablet Take 1 tablet by mouth every 4 hours as needed (abd pain) 360 tablet 3    NONFORMULARY Take 450 mg by mouth 2 times daily      nitroGLYCERIN (NITROSTAT) 0.4 MG SL tablet Place 1 tablet under the tongue every 5 minutes as needed for Chest pain 25 tablet 5    Glucose Blood (BLOOD GLUCOSE TEST STRIPS) STRP [employer] True Metrix Test strips Test BS's every two weeks as directed Dx: R73.9 50 strip 3    Cinnamon 500 MG CAPS Take 500 mg by mouth Daily      Milk Thistle 1000 MG CAPS Take 1,000 mg by mouth Daily      Lancets MISC TruePlus Super Thin 28G lancets Test BS's every 2 weeks as directed Dx: R73.9 50 each 11    Ubiquinol 100 MG CAPS Take 1 tablet by mouth daily.  Bilberry 100 MG CAPS Take 100 mg by mouth Daily       fish oil-omega-3 fatty acids 1000 MG capsule Take 1 g by mouth daily.  aspirin 81 MG EC tablet Take 81 mg by mouth daily.  Chromium Picolinate 500 MCG TABS Take 500 mg by mouth daily.  Vitamin E 400 UNIT TABS Take 400 mg by mouth daily.  Ascorbic Acid (VITAMIN C CR) 500 MG TBCR Take 500 mg by mouth daily.  Methylsulfonylmethane (MSM) 1500 MG TABS Take 1,500 mg by mouth daily.  Lycopene 10 MG CAPS Take 20 mg by mouth daily.  Multiple Vitamin (MULTIVITAMIN PO) Take 1 tablet by mouth daily.  Cholecalciferol (VITAMIN D3) 2000 UNIT TABS Take 2,000 mg by mouth daily.  Pyridoxine HCl (VITAMIN B-6) 50 MG tablet Take 50 mg by mouth 2 times daily.  Vitamins-Lipotropics (BALANCED B-50 COMPLEX PO) Take  by mouth 2 times daily.  Saw Palmetto 500 MG CAPS Take 1 tablet by mouth 2 times daily. No current facility-administered medications for this visit.       Allergies   Allergen Reactions    Avodart [Dutasteride] Other (See Comments)     Chest tightness     Health Maintenance   Topic Date Due    DTaP/Tdap/Td vaccine (1 - Tdap) 10/08/1956    Annual Wellness Visit (AWV)

## 2020-02-24 NOTE — PROGRESS NOTES
Called Dr. Palma Must office and spoke with Tammi Dunne let her know patient recently diagnosed with bronchitis

## 2020-02-25 ENCOUNTER — HOSPITAL ENCOUNTER (OUTPATIENT)
Dept: CT IMAGING | Age: 75
Discharge: HOME | End: 2020-02-25
Payer: MEDICARE

## 2020-02-25 PROCEDURE — 6360000004 HC RX CONTRAST MEDICATION: Performed by: UROLOGY

## 2020-02-25 PROCEDURE — 74178 CT ABD&PLV WO CNTR FLWD CNTR: CPT

## 2020-02-25 RX ADMIN — IOPAMIDOL 85 ML: 755 INJECTION, SOLUTION INTRAVENOUS at 16:06

## 2020-02-26 ENCOUNTER — TELEPHONE (OUTPATIENT)
Dept: UROLOGY | Age: 75
End: 2020-02-26

## 2020-02-27 ENCOUNTER — TELEPHONE (OUTPATIENT)
Dept: UROLOGY | Age: 75
End: 2020-02-27

## 2020-02-27 NOTE — TELEPHONE ENCOUNTER
Patient notified of a change in surgery time on 02-. Patient advised to be at same day surgery by 8:00am for a 10:16am surgery. Patient voiced understanding.     (Patient was upset with the second time change and not being notified until the day before)

## 2020-02-28 ENCOUNTER — HOSPITAL ENCOUNTER (OUTPATIENT)
Age: 75
Setting detail: OUTPATIENT SURGERY
Discharge: HOME | End: 2020-02-28
Attending: UROLOGY | Admitting: UROLOGY
Payer: MEDICARE

## 2020-02-28 ENCOUNTER — ANESTHESIA EVENT (OUTPATIENT)
Dept: OPERATING ROOM | Age: 75
End: 2020-02-28
Payer: MEDICARE

## 2020-02-28 ENCOUNTER — ANESTHESIA (OUTPATIENT)
Dept: OPERATING ROOM | Age: 75
End: 2020-02-28
Payer: MEDICARE

## 2020-02-28 VITALS
WEIGHT: 229 LBS | TEMPERATURE: 97.8 F | SYSTOLIC BLOOD PRESSURE: 140 MMHG | DIASTOLIC BLOOD PRESSURE: 73 MMHG | HEIGHT: 72 IN | BODY MASS INDEX: 31.02 KG/M2 | OXYGEN SATURATION: 94 % | RESPIRATION RATE: 16 BRPM | HEART RATE: 72 BPM

## 2020-02-28 VITALS
RESPIRATION RATE: 2 BRPM | SYSTOLIC BLOOD PRESSURE: 84 MMHG | DIASTOLIC BLOOD PRESSURE: 48 MMHG | OXYGEN SATURATION: 100 %

## 2020-02-28 PROCEDURE — 3700000001 HC ADD 15 MINUTES (ANESTHESIA): Performed by: UROLOGY

## 2020-02-28 PROCEDURE — 7100000000 HC PACU RECOVERY - FIRST 15 MIN: Performed by: UROLOGY

## 2020-02-28 PROCEDURE — 3600000013 HC SURGERY LEVEL 3 ADDTL 15MIN: Performed by: UROLOGY

## 2020-02-28 PROCEDURE — 3700000000 HC ANESTHESIA ATTENDED CARE: Performed by: UROLOGY

## 2020-02-28 PROCEDURE — 2500000003 HC RX 250 WO HCPCS: Performed by: REGISTERED NURSE

## 2020-02-28 PROCEDURE — 88307 TISSUE EXAM BY PATHOLOGIST: CPT

## 2020-02-28 PROCEDURE — 7100000010 HC PHASE II RECOVERY - FIRST 15 MIN: Performed by: UROLOGY

## 2020-02-28 PROCEDURE — 2580000003 HC RX 258: Performed by: UROLOGY

## 2020-02-28 PROCEDURE — 7100000011 HC PHASE II RECOVERY - ADDTL 15 MIN: Performed by: UROLOGY

## 2020-02-28 PROCEDURE — 2720000010 HC SURG SUPPLY STERILE: Performed by: UROLOGY

## 2020-02-28 PROCEDURE — 52234 CYSTOSCOPY AND TREATMENT: CPT | Performed by: UROLOGY

## 2020-02-28 PROCEDURE — 3600000003 HC SURGERY LEVEL 3 BASE: Performed by: UROLOGY

## 2020-02-28 PROCEDURE — 6360000002 HC RX W HCPCS: Performed by: UROLOGY

## 2020-02-28 PROCEDURE — 2709999900 HC NON-CHARGEABLE SUPPLY: Performed by: UROLOGY

## 2020-02-28 PROCEDURE — 6360000002 HC RX W HCPCS: Performed by: REGISTERED NURSE

## 2020-02-28 PROCEDURE — 7100000001 HC PACU RECOVERY - ADDTL 15 MIN: Performed by: UROLOGY

## 2020-02-28 RX ORDER — DEXAMETHASONE SODIUM PHOSPHATE 4 MG/ML
INJECTION, SOLUTION INTRA-ARTICULAR; INTRALESIONAL; INTRAMUSCULAR; INTRAVENOUS; SOFT TISSUE PRN
Status: DISCONTINUED | OUTPATIENT
Start: 2020-02-28 | End: 2020-02-28 | Stop reason: SDUPTHER

## 2020-02-28 RX ORDER — CIPROFLOXACIN 250 MG/1
250 TABLET, FILM COATED ORAL 2 TIMES DAILY
Qty: 3 TABLET | Refills: 0 | Status: SHIPPED | OUTPATIENT
Start: 2020-02-28 | End: 2020-03-01

## 2020-02-28 RX ORDER — PROPOFOL 10 MG/ML
INJECTION, EMULSION INTRAVENOUS PRN
Status: DISCONTINUED | OUTPATIENT
Start: 2020-02-28 | End: 2020-02-28 | Stop reason: SDUPTHER

## 2020-02-28 RX ORDER — LABETALOL 20 MG/4 ML (5 MG/ML) INTRAVENOUS SYRINGE
5 EVERY 10 MIN PRN
Status: DISCONTINUED | OUTPATIENT
Start: 2020-02-28 | End: 2020-02-28 | Stop reason: HOSPADM

## 2020-02-28 RX ORDER — LIDOCAINE HCL/PF 100 MG/5ML
SYRINGE (ML) INJECTION PRN
Status: DISCONTINUED | OUTPATIENT
Start: 2020-02-28 | End: 2020-02-28 | Stop reason: SDUPTHER

## 2020-02-28 RX ORDER — ONDANSETRON 2 MG/ML
INJECTION INTRAMUSCULAR; INTRAVENOUS PRN
Status: DISCONTINUED | OUTPATIENT
Start: 2020-02-28 | End: 2020-02-28 | Stop reason: SDUPTHER

## 2020-02-28 RX ORDER — FENTANYL CITRATE 50 UG/ML
50 INJECTION, SOLUTION INTRAMUSCULAR; INTRAVENOUS EVERY 5 MIN PRN
Status: DISCONTINUED | OUTPATIENT
Start: 2020-02-28 | End: 2020-02-28 | Stop reason: HOSPADM

## 2020-02-28 RX ORDER — FENTANYL CITRATE 50 UG/ML
INJECTION, SOLUTION INTRAMUSCULAR; INTRAVENOUS PRN
Status: DISCONTINUED | OUTPATIENT
Start: 2020-02-28 | End: 2020-02-28 | Stop reason: SDUPTHER

## 2020-02-28 RX ORDER — ONDANSETRON 2 MG/ML
4 INJECTION INTRAMUSCULAR; INTRAVENOUS
Status: DISCONTINUED | OUTPATIENT
Start: 2020-02-28 | End: 2020-02-28 | Stop reason: HOSPADM

## 2020-02-28 RX ORDER — FENTANYL CITRATE 50 UG/ML
25 INJECTION, SOLUTION INTRAMUSCULAR; INTRAVENOUS EVERY 5 MIN PRN
Status: DISCONTINUED | OUTPATIENT
Start: 2020-02-28 | End: 2020-02-28 | Stop reason: HOSPADM

## 2020-02-28 RX ORDER — EPHEDRINE SULFATE/0.9% NACL/PF 50 MG/5 ML
SYRINGE (ML) INTRAVENOUS PRN
Status: DISCONTINUED | OUTPATIENT
Start: 2020-02-28 | End: 2020-02-28 | Stop reason: SDUPTHER

## 2020-02-28 RX ORDER — SODIUM CHLORIDE 9 MG/ML
INJECTION, SOLUTION INTRAVENOUS CONTINUOUS
Status: DISCONTINUED | OUTPATIENT
Start: 2020-02-28 | End: 2020-02-28 | Stop reason: HOSPADM

## 2020-02-28 RX ORDER — PROMETHAZINE HYDROCHLORIDE 25 MG/ML
6.25 INJECTION, SOLUTION INTRAMUSCULAR; INTRAVENOUS
Status: DISCONTINUED | OUTPATIENT
Start: 2020-02-28 | End: 2020-02-28 | Stop reason: HOSPADM

## 2020-02-28 RX ADMIN — CEFAZOLIN 2 G: 10 INJECTION, POWDER, FOR SOLUTION INTRAVENOUS at 10:59

## 2020-02-28 RX ADMIN — Medication 20 MG: at 11:13

## 2020-02-28 RX ADMIN — FENTANYL CITRATE 100 MCG: 50 INJECTION, SOLUTION INTRAMUSCULAR; INTRAVENOUS at 10:56

## 2020-02-28 RX ADMIN — PROPOFOL 200 MG: 10 INJECTION, EMULSION INTRAVENOUS at 10:56

## 2020-02-28 RX ADMIN — DEXAMETHASONE SODIUM PHOSPHATE 10 MG: 4 INJECTION, SOLUTION INTRAMUSCULAR; INTRAVENOUS at 10:56

## 2020-02-28 RX ADMIN — Medication 100 MG: at 10:56

## 2020-02-28 RX ADMIN — Medication 10 MG: at 11:18

## 2020-02-28 RX ADMIN — SODIUM CHLORIDE: 9 INJECTION, SOLUTION INTRAVENOUS at 08:59

## 2020-02-28 RX ADMIN — Medication 20 MG: at 11:07

## 2020-02-28 RX ADMIN — ONDANSETRON HYDROCHLORIDE 4 MG: 4 INJECTION, SOLUTION INTRAMUSCULAR; INTRAVENOUS at 10:56

## 2020-02-28 ASSESSMENT — PULMONARY FUNCTION TESTS
PIF_VALUE: 2
PIF_VALUE: 1
PIF_VALUE: 1
PIF_VALUE: 3
PIF_VALUE: 2
PIF_VALUE: 3
PIF_VALUE: 1
PIF_VALUE: 4
PIF_VALUE: 4
PIF_VALUE: 2
PIF_VALUE: 0
PIF_VALUE: 2
PIF_VALUE: 3
PIF_VALUE: 3
PIF_VALUE: 2
PIF_VALUE: 3
PIF_VALUE: 2
PIF_VALUE: 3
PIF_VALUE: 2

## 2020-02-28 ASSESSMENT — PAIN SCALES - GENERAL
PAINLEVEL_OUTOF10: 0
PAINLEVEL_OUTOF10: 1

## 2020-02-28 ASSESSMENT — PAIN - FUNCTIONAL ASSESSMENT: PAIN_FUNCTIONAL_ASSESSMENT: 0-10

## 2020-02-28 NOTE — ANESTHESIA POSTPROCEDURE EVALUATION
Department of Anesthesiology  Postprocedure Note    Patient: Eran Sewell  MRN: [id number]  YOB: 1945  Date of evaluation: 2/28/2020  Time:  12:28 PM     Procedure Summary     Date:  02/28/20 Room / Location:  [facility]    Anesthesia Start:  8767 Anesthesia Stop:  1130    Procedure:  CYSTOSCOPY TRANSURETHRAL RESECTION BLADDER TUMOR (N/A Bladder) Diagnosis:  (bladder tumor)    Surgeon:  Inderjit Delarosa MD Responsible Provider:  Eren Hillman MD    Anesthesia Type:  general ASA Status:  3          Anesthesia Type: general    Jayleen Phase I: Jayleen Score: 10    Jayleen Phase II:      Last vitals: Reviewed and per EMR flowsheets.        Anesthesia Post Evaluation    Patient location during evaluation: PACU  Patient participation: complete - patient participated  Level of consciousness: awake and alert  Airway patency: patent  Nausea & Vomiting: no nausea  Complications: no  Cardiovascular status: blood pressure returned to baseline and hemodynamically stable  Respiratory status: acceptable and spontaneous ventilation  Hydration status: euvolemic

## 2020-02-28 NOTE — BRIEF OP NOTE
Brief Postoperative Note  ______________________________________________________________    Patient: Opal Culp  YOB: 1945  MRN: [id number]  Date of Procedure: 2/28/2020    Pre-Op Diagnosis: bladder cancer    Post-Op Diagnosis: Same       Procedure(s):  CYSTOSCOPY TRANSURETHRAL RESECTION BLADDER TUMOR    Anesthesia: General    Surgeon(s):  Suzi Connell MD    Assistant: Shannon Albright    Estimated Blood Loss (mL): less than 50     Complications: Unplanned perforation of  Bladder wall    Specimens:   ID Type Source Tests Collected by Time Destination   A : TUMOR - LEFT DOME OF BLADDER Tissue Bladder SURGICAL PATHOLOGY Yousif Vidal RN 2/28/2020 1111    B : BASE OF TUMOR - LEFT DOME OF BLADDER Tissue Bladder SURGICAL PATHOLOGY Yousif Vidal RN 2/28/2020 1115        Implants:  * No implants in log *      Drains: * No LDAs found *    Findings: 1.5 cm sessile bladder tumor near the dome of the bladder    Eduardo Hsieh MD  Date: 2/28/2020  Time: 11:38 AM

## 2020-02-28 NOTE — ANESTHESIA PRE PROCEDURE
Department of Anesthesiology  Preprocedure Note       Name:  Smooth Packer   Age:  75 y.o.  :  1945                                          MRN:  [id number]         Date:  2020      Surgeon: Ar Hurtado):  Bnuny Nieto MD    Procedure: CYSTOSCOPY TRANSURETHRAL RESECTION BLADDER TUMOR (N/A Bladder)    Medications prior to admission:   Prior to Admission medications    Medication Sig Start Date End Date Taking? Authorizing Provider   Psyllium (METAMUCIL FIBER PO) Take 2 tablets by mouth daily   Yes Historical Provider, MD   atorvastatin (LIPITOR) 40 MG tablet Take 1 tablet by mouth nightly 10/21/19  Yes Indu Joyce MD   dicyclomine (BENTYL) 20 MG tablet Take 1 tablet by mouth 2 times daily 10/21/19  Yes Indu Joyce MD   metoprolol tartrate (LOPRESSOR) 25 MG tablet Take 1 tablet by mouth daily 19  Yes Indu Joyce MD   lisinopril (PRINIVIL;ZESTRIL) 10 MG tablet Take 1 tablet by mouth daily 3/21/19  Yes Indu Joyce MD   omeprazole (PRILOSEC) 20 MG delayed release capsule Take 1 capsule by mouth daily 3/21/19  Yes Indu Joyce MD   blood glucose monitor strips True Metrix Test Strips, test QD Diagnosis:E11.9 3/21/19  Yes Indu Joyce MD   hyoscyamine (NULEV) 125 MCG TBDP dispersible tablet Take 1 tablet by mouth every 4 hours as needed (abd pain) 18  Yes Indu Joyce MD   NONFORMULARY Take 450 mg by mouth 2 times daily   Yes Historical Provider, MD   Glucose Blood (BLOOD GLUCOSE TEST STRIPS) STRP [employer] True Metrix Test strips Test BS's every two weeks as directed Dx: R73.9 17  Yes Indu Joyce MD   Cinnamon 500 MG CAPS Take 500 mg by mouth Daily   Yes Historical Provider, MD   Milk Thistle 1000 MG CAPS Take 1,000 mg by mouth Daily   Yes Historical Provider, MD   Lancets MISC TruePlus Super Thin 28G lancets Test BS's every 2 weeks as directed Dx: R73.9 16  Yes Florentin Domínguez, APRN - CNP   Ubiquinol 100 MG CAPS Take 1 tablet by mouth daily.    Yes Historical g in dextrose 5 % 50 mL IVPB  2 g Intravenous 30 Min Pre-Op Deejay Hernandez MD           Allergies: Allergies   Allergen Reactions    Avodart [Dutasteride] Other (See Comments)     Chest tightness       Problem List:    Patient Active Problem List   Diagnosis Code    Hyperlipidemia E78.5    Essential hypertension, benign I10    Obesity (BMI 30.0-34. 9) E66.9    Spinal stenosis of lumbar region with neurogenic claudication M48.062    Chest pain syndrome R07.9    Abnormal nuclear cardiac imaging test R93.1    Obstructive sleep apnea on CPAP G47.33, Z99.89    Primary localized osteoarthrosis, pelvic region and thigh M16.10    Anal fissure K60.2    Hyperglycemia R73.9    Chest pain at rest R07.9    Benign prostatic hyperplasia with incomplete bladder emptying N40.1, R39.14    Bladder tumor D49.4    Malignant neoplasm of trigone of urinary bladder (HCC) C67.0    Malignant neoplasm of posterior wall of urinary bladder (HCC) C67.4    BPH with obstruction/lower urinary tract symptoms N40.1, N13.8    Anxiety F41.9    Chest pain R07.9    Contusion of right foot S90.31XA       Past Medical History:        Diagnosis Date    Arthritis     CAD (coronary artery disease)     Chronic obstructive pulmonary disease ([facility] [address] 9/17/2019    GERD (gastroesophageal reflux disease)     Hyperglycemia 3/18/2016    Hyperlipidemia     Hypertension     IBS (irritable bowel syndrome)     Kidney stones     Malignant neoplasm of trigone of urinary bladder (HCC)     Obstructive sleep apnea     wears cpap    Prostatitis        Past Surgical History:        Procedure Laterality Date    ABDOMEN SURGERY      BACK SURGERY  07/08/2013    Lumbar Coyle L2-S-1, revision, PLIF L5-S-1 w/ grafting    CARDIAC CATHETERIZATION  9/2007    COLON SURGERY  2008    Dr. Kenzie Negron  2011?     Dr. Sabrina Boggs  2007    DILATATION, ESOPHAGUS      ENDOSCOPY, COLON, DIAGNOSTIC      HERNIA REPAIR  2005    Dr. Jayson Wilkins Right 3/25/2014    right total hip    OTHER SURGICAL HISTORY  2016    TURBT by Dr Merry Meigs       Social History:    Social History     Tobacco Use    Smoking status: Former Smoker     Packs/day: 1.00     Years: 25.00     Pack years: 25.00     Last attempt to quit: 1995     Years since quittin.1    Smokeless tobacco: Former User   Substance Use Topics    Alcohol use: Yes     Alcohol/week: 1.0 standard drinks     Types: 1 Cans of beer per week     Comment: occas                                Counseling given: Not Answered      Vital Signs (Current):   Vitals:    20 0824 20 0853   BP: 100/60    Pulse: 62    Resp: 16    Temp: 97.8 °F (36.6 °C)    TempSrc: Temporal    SpO2: 95%    Weight:  229 lb (103.9 kg)   Height:  6' (1.829 m)                                              BP Readings from Last 3 Encounters:   20 100/60   20 126/84   20 130/62       NPO Status: Time of last liquid consumption: 07                        Time of last solid consumption:                         Date of last liquid consumption: 20                        Date of last solid food consumption: 20    BMI:   Wt Readings from Last 3 Encounters:   20 229 lb (103.9 kg)   20 229 lb (103.9 kg)   20 236 lb 8 oz (107.3 kg)     Body mass index is 31.06 kg/m².     CBC:   Lab Results   Component Value Date    WBC 7.5 2019    WBC 7.0 2018    RBC 4.57 2019    HGB 14.1 2019    HCT 41.0 2019    MCV 89.7 2019    RDW 12.2 2019     2019     2018       CMP:   Lab Results   Component Value Date     2020    K 4.4 2020     2020    CO2 29 2020    BUN 20 2020    CREATININE 1.02 2020    LABGLOM 83 2018    GLUCOSE 103 2020    PROT 6.7 2019    CALCIUM 9.6 2020    BILITOT 1.6 2019

## 2020-02-28 NOTE — H&P
Siobhan Molina MD  History and Physical    Patient:  Timothy Gold  MRN: [id number]  YOB: 1945    HISTORY OF PRESENT ILLNESS:     The patient is a 75 y.o. male who presents with BLADDER NEOPLASM. Here for procedure. Patient's old records, notes and chart reviewed and summarized above. Siobhan Molina MD independently reviewed the images and verified the radiology reports from:    Ct Abdomen Pelvis W Wo Contrast Additional Contrast? None    Result Date: 2/25/2020  PROCEDURE: CT ABDOMEN PELVIS W WO CONTRAST CLINICAL INFORMATION: 76-year-old male with a history of urinary bladder cancer diagnosed 5 years ago with recent recurrence . COMPARISON: CT 7/12/2016. TECHNIQUE: 5 mm axial CT images were obtained through the abdomen and pelvis before and after the administration of intravenous and oral contrast. Coronal and sagittal reconstructions were obtained. All CT scans at this facility use dose modulation, iterative reconstruction, and/or weight-based dosing when appropriate to reduce radiation dose to as low as reasonably achievable. FINDINGS: Several nodular densities are seen abutting the pleural surface at the lung bases on both sides, most of which have internal calcifications. The base of the heart is within normal limits. The liver appears hypoattenuated which could be due to fatty infiltration. Calcified granulomas are seen in the spleen. The pancreas and adrenal glands are within normal limits. Numerous low-density lesions arising from both kidneys likely representative of cysts. The largest is seen at the superior pole the right kidney measuring 2.1 x 1.7 cm. The kidneys are symmetric in size, shape and degree of enhancement. There is no hydronephrosis. There is a 4 mm nonobstructive stone in the inferior pole the left kidney. 4 additional nonobstructive stones measuring 2 mm are seen in the left kidney.  There are 2 stones in the right kidney which are nonobstructive measuring 2 and 1 mm. Postsurgical changes are seen in the lower abdomen from ventral hernia repair. There is no evidence of a small bowel obstruction. Calcifications are seen in the prostate gland. There are postsurgical changes in the region of the sigmoid colon. Diverticula are seen throughout the colon. There is no colonic wall thickening or edema. The appendix has a normal appearance with no inflammatory changes. There is a right total hip arthroplasty causing extensive streak artifact and limiting evaluation of the pelvis. However, on series #5 image #44 at the superior aspect of the urinary bladder there is a density which appears to be adherent to the roof of the bladder measuring 7 x 6 mm. Atherosclerotic changes are seen in the aorta. There is no aneurysmal dilatation. The IVC has a normal caliber. There is a small fat-containing ventral abdominal wall hernia. Fusion hardware is seen spanning L2-S1. There are degenerative changes of the spine. No acute fractures. 1. Evaluation of the urinary bladder is limited due to extensive streak artifact from the adjacent hip prosthesis. However, on the sagittal image there appears to be some thickening at the superior aspect of the urinary bladder with a nodular density which could represent a neoplastic process. Direct visualization is recommended for further characterization. 2. There are numerous nodular densities at the pleural surfaces of the bilateral lung bases. Any of these are calcified however some are not. While these likely represent calcified and poorly calcified granulomas, other etiologies can't entirely be excluded. Further evaluation with IV contrast enhanced thoracic CT is recommended for further characterization. 3. Bilateral nonobstructive nephrolithiasis. 4. Low-density lesions arising from both kidneys likely representing cysts. **This report has been created using voice recognition software.  It may contain minor errors which are inherent in voice recognition technology. ** Final report electronically signed by Dr Tamiko Solis on 2/25/2020 4:46 PM        Past Medical History:    Past Medical History:   Diagnosis Date    Arthritis     CAD (coronary artery disease)     Chronic obstructive pulmonary disease ([address] 9/17/2019    GERD (gastroesophageal reflux disease)     Hyperglycemia 3/18/2016    Hyperlipidemia     Hypertension     IBS (irritable bowel syndrome)     Kidney stones     Malignant neoplasm of trigone of urinary bladder (HCC)     Obstructive sleep apnea     wears cpap    Prostatitis        Past Surgical History:    Past Surgical History:   Procedure Laterality Date    ABDOMEN SURGERY      BACK SURGERY  07/08/2013    Lumbar Coyle L2-S-1, revision, PLIF L5-S-1 w/ grafting    CARDIAC CATHETERIZATION  9/2007    COLON SURGERY  2008    Dr. Pierre Dad  2011? Dr. Gonzalo Grier  2007    DILATATION, ESOPHAGUS      ENDOSCOPY, COLON, DIAGNOSTIC      HERNIA REPAIR  2005    Dr. Cher Henderson Right 3/25/2014    right total hip    OTHER SURGICAL HISTORY  08/25/2016    TURBT by Dr Debbi Harada       Medications Prior to Admission:    Prior to Admission medications    Medication Sig Start Date End Date Taking?  Authorizing Provider   Psyllium (METAMUCIL FIBER PO) Take 2 tablets by mouth daily   Yes Historical Provider, MD   atorvastatin (LIPITOR) 40 MG tablet Take 1 tablet by mouth nightly 10/21/19  Yes Nick Perkins MD   dicyclomine (BENTYL) 20 MG tablet Take 1 tablet by mouth 2 times daily 10/21/19  Yes Nick Perkins MD   metoprolol tartrate (LOPRESSOR) 25 MG tablet Take 1 tablet by mouth daily 9/17/19  Yes Nick Perkins MD   lisinopril (PRINIVIL;ZESTRIL) 10 MG tablet Take 1 tablet by mouth daily 3/21/19  Yes Nick Perkins MD   omeprazole (PRILOSEC) 20 MG delayed release capsule Take 1 capsule by mouth daily 3/21/19  Yes Nick Perkins MD   blood glucose monitor strips True Metrix Test Strips, test QD Diagnosis:E11.9 3/21/19  Yes Abelardo Bach MD   hyoscyamine (NULEV) 125 MCG TBDP dispersible tablet Take 1 tablet by mouth every 4 hours as needed (abd pain) 12/20/18  Yes Abelardo Bach MD   NONFORMULARY Take 450 mg by mouth 2 times daily   Yes Historical Provider, MD   Glucose Blood (BLOOD GLUCOSE TEST STRIPS) STRP [employer] True Metrix Test strips Test BS's every two weeks as directed Dx: R73.9 6/13/17  Yes Abelardo Bach MD   Cinnamon 500 MG CAPS Take 500 mg by mouth Daily   Yes Historical Provider, MD   Milk Thistle 1000 MG CAPS Take 1,000 mg by mouth Daily   Yes Historical Provider, MD   Lancets MISC TruePlus Super Thin 28G lancets Test BS's every 2 weeks as directed Dx: R73.9 4/26/16  Yes CHRISTA Frey - CNP   Ubiquinol 100 MG CAPS Take 1 tablet by mouth daily. Yes Historical Provider, MD   Bilberry 100 MG CAPS Take 100 mg by mouth Daily    Yes Historical Provider, MD   fish oil-omega-3 fatty acids 1000 MG capsule Take 1 g by mouth daily. Yes Historical Provider, MD   aspirin 81 MG EC tablet Take 81 mg by mouth daily. Yes Historical Provider, MD   Chromium Picolinate 500 MCG TABS Take 500 mg by mouth daily. Yes Historical Provider, MD   Vitamin E 400 UNIT TABS Take 400 mg by mouth daily. Yes Historical Provider, MD   Methylsulfonylmethane (MSM) 1500 MG TABS Take 1,500 mg by mouth daily. Yes Historical Provider, MD   Lycopene 10 MG CAPS Take 20 mg by mouth daily. Yes Historical Provider, MD   Multiple Vitamin (MULTIVITAMIN PO) Take 1 tablet by mouth daily. Yes Historical Provider, MD   Cholecalciferol (VITAMIN D3) 2000 UNIT TABS Take 2,000 mg by mouth daily. Yes Historical Provider, MD   Pyridoxine HCl (VITAMIN B-6) 50 MG tablet Take 50 mg by mouth 2 times daily. Yes Historical Provider, MD   Vitamins-Lipotropics (BALANCED B-50 COMPLEX PO) Take  by mouth 2 times daily.      Yes Historical Provider, MD Jericho Burgess) 0.25 MG tablet TAKE 1 TABLET BY MOUTH NIGHTLY AS NEEDED FOR SLEEP 12/17/19 3/17/20  Taiwo Soto MD   tamsulosin [facility]) 0.4 MG capsule Take 2 capsules by mouth nightly 19  Taiwo Soto MD   Blood Glucose Calibration (CONTROL) Normal SOLN True Metrix Control Solution, Diagnosis:E11.9 3/21/19   Taiwo Soto MD   nitroGLYCERIN (NITROSTAT) 0.4 MG SL tablet Place 1 tablet under the tongue every 5 minutes as needed for Chest pain 17   Taiwo Soto MD   Ascorbic Acid (VITAMIN C CR) 500 MG TBCR Take 500 mg by mouth daily. Historical Provider, MD       Allergies:  Avodart [dutasteride]    Social History:    Social History     Socioeconomic History    Marital status:      Spouse name: Not on file    Number of children: Not on file    Years of education: Not on file    Highest education level: Not on file   Occupational History     Employer: [employer]    Social Needs    Financial resource strain: Not hard at all   [employer] insecurity:     Worry: Never true     Inability: Never true   [facility] needs:     Medical: No     Non-medical: No   Tobacco Use    Smoking status: Former Smoker     Packs/day: 1.00     Years: 25.00     Pack years: 25.00     Last attempt to quit: 1995     Years since quittin.1    Smokeless tobacco: Former User   Substance and Sexual Activity    Alcohol use:  Yes     Alcohol/week: 1.0 standard drinks     Types: 1 Cans of beer per week     Comment: occas    Drug use: No    Sexual activity: Not Currently     Partners: Female   Lifestyle    Physical activity:     Days per week: Not on file     Minutes per session: Not on file    Stress: Not on file   Relationships    Social connections:     Talks on phone: Not on file     Gets together: Not on file     Attends [employer] service: Not on file     Active member of club or organization: Not on file     Attends meetings of clubs or organizations: Not on file     Relationship status:

## 2020-02-28 NOTE — PROGRESS NOTES
45 ARRIVED IN PACU FROM O R AFTER GENERAL ANESTHESIA. SEE FLOW SHEET . 1145 RESTS CALM AND COMFORTABLE. DENIES PAIN   1216 TO [facility] IN GOOD CONDITION.  REPORT TO STEVE BERRY

## 2020-02-28 NOTE — PROGRESS NOTES
Oriented to sds    5    Refuses flu and pneumonia vaccine. Family updated to register with volunteer out at . Informed family to take belonging with them. Keep nothing of value in room unattended. Medication given back to family. Family is taking them with them. Informed to get the case number for surgery from volunteer out front to be able to follow them through surgery. pt was asked and agreed to first name and last initial being put on white boards. Allergy and fall risks applied. SCD Applied to patient. Warming blanket applied to patient. Pt denies any abuse or thoughts of suicide.

## 2020-02-29 NOTE — OP NOTE
lens were passed into the urethra. The anterior urethra appeared normal.  Passing through the membranous  urethra, the prostatic urethra revealed a 35 gm adenoma. The bladder  was entered and briefly inspected. There was moderate trabeculation  throughout the bladder. The ureteral orifices were in their normal  position. The sessile tumor was located on the left upper lateral/dome  of the bladder. No satellite lesions were noted. No new lesions were  noted. The visual obturator was exchanged for an Fyber6 Publer 30-degree lens and resectoscope loop. TURBT was then performed. Two specimens were submitted, the bulk of the tumor and then the base of  the tumor. At the end of the procedure, while cauterizing, there  appeared to be a small perforation of the bladder. A small amount of  fat could be seen and as such, I elected to put a Mohamud catheter in at  the end of the case. There was good hemostasis. No other findings were  noted. The scope and sheath were removed. A 16-Hong Konger Mohamud catheter  was placed in the bladder. 10 mL of sterile water was placed in the  balloon. The catheter was left to closed straight drainage. The  patient tolerated the procedure well with minimal blood loss. The  patient returned to PACU.         Lisette Tang M.D.    D: 02/28/2020 11:49:20       T: 02/28/2020 13:58:14     [email or web address]  Job#: [id number]     Doc#: [id number]    CC:

## 2020-03-05 ENCOUNTER — NURSE ONLY (OUTPATIENT)
Dept: UROLOGY | Age: 75
End: 2020-03-05
Payer: MEDICARE

## 2020-03-05 VITALS
WEIGHT: 226 LBS | SYSTOLIC BLOOD PRESSURE: 130 MMHG | DIASTOLIC BLOOD PRESSURE: 72 MMHG | BODY MASS INDEX: 30.61 KG/M2 | HEIGHT: 72 IN

## 2020-03-05 PROCEDURE — 51700 IRRIGATION OF BLADDER: CPT | Performed by: UROLOGY

## 2020-03-09 LAB
ABSOLUTE BASO #: 0 X10E9/L (ref 0–0.9)
ABSOLUTE EOS #: 0.2 X10E9/L (ref 0–0.4)
ABSOLUTE LYMPH #: 3 X10E9/L (ref 1–3.5)
ABSOLUTE MONO #: 0.8 X10E9/L (ref 0–0.9)
ABSOLUTE NEUT #: 3.9 X10E9/L (ref 1.5–6.6)
ALBUMIN SERPL-MCNC: 4.1 G/DL (ref 3.2–5.3)
ALK PHOSPHATASE: 69 U/L (ref 39–130)
ALT SERPL-CCNC: 19 U/L (ref 0–40)
ANION GAP SERPL CALCULATED.3IONS-SCNC: 10 MMOL/L (ref 5–15)
APPEARANCE: CLEAR
AST SERPL-CCNC: 20 U/L (ref 0–41)
AVERAGE GLUCOSE: 120 MG/DL
BASOPHILS RELATIVE PERCENT: 0.6 %
BILIRUB SERPL-MCNC: 1.4 MG/DL (ref 0.3–1.2)
BILIRUBIN: NEGATIVE
BUN BLDV-MCNC: 25 MG/DL (ref 5–27)
CALCIUM SERPL-MCNC: 8.9 MG/DL (ref 8.5–10.5)
CHLORIDE BLD-SCNC: 105 MMOL/L (ref 98–109)
CHOLESTEROL/HDL RATIO: 2 (ref 1–5)
CHOLESTEROL: 118 MG/DL (ref 150–200)
CO2: 25 MMOL/L (ref 22–32)
COLOR: YELLOW
CREAT SERPL-MCNC: 0.93 MG/DL (ref 0.6–1.3)
EGFR AFRICAN AMERICAN: >60 ML/MIN/1.73SQ.M
EGFR IF NONAFRICAN AMERICAN: >60 ML/MIN/1.73SQ.M
EOSINOPHILS RELATIVE PERCENT: 2.6 %
GLUCOSE BLD-MCNC: NEGATIVE MG/DL
GLUCOSE: 108 MG/DL (ref 65–99)
HBA1C MFR BLD: 5.8 % (ref 4.4–6.4)
HCT VFR BLD CALC: 40.7 % (ref 36–53)
HDLC SERPL-MCNC: 60 MG/DL
HEMOGLOBIN: 14 G/DL (ref 12.6–17.4)
KETONES, URINE: NEGATIVE MG/DL
LDL CHOLESTEROL CALCULATED: 46 MG/DL
LDL/HDL RATIO: 0.8
LEUKOCYTE ESTERASE, URINE: NEGATIVE
LYMPHOCYTE %: 38 %
MCH RBC QN AUTO: 31.3 PG (ref 27–35)
MCHC RBC AUTO-ENTMCNC: 34.3 G/DL (ref 31–36)
MCV RBC AUTO: 91 FL (ref 81–101)
MONOCYTES # BLD: 10.1 %
NEUTROPHILS RELATIVE PERCENT: 48.7 %
NITRITE, URINE: NEGATIVE
OCCULT BLOOD,URINE: ABNORMAL
OTHER MICROSCOPIC ELEMENTS: ABNORMAL
PDW BLD-RTO: 12.8 % (ref 11.4–14.3)
PH: 6 (ref 5–8.5)
PLATELETS: 208 X10E9/L (ref 150–450)
PMV BLD AUTO: 8.9 FL (ref 7–12)
POTASSIUM SERPL-SCNC: 4 MMOL/L (ref 3.5–5)
PROTEIN, URINE: ABNORMAL MG/DL
RBC: 12 /HPF (ref 0–5)
RBC: 4.47 X10E12/L (ref 3.3–5.5)
SITE/TYPE: ABNORMAL
SODIUM BLD-SCNC: 140 MMOL/L (ref 134–146)
SP GRAVITY MISCELLANEOUS: 1.02 (ref 1–1.03)
TOTAL PROTEIN: 6.7 G/DL (ref 6–8)
TRIGL SERPL-MCNC: 60 MG/DL (ref 27–150)
UROBILINOGEN, URINE: <1.1 EU/DL
VLDLC SERPL CALC-MCNC: 12 MG/DL (ref 0–30)
WBC: 2 /HPF (ref 0–5)
WBC: 8 X10E9/L (ref 4.4–12)

## 2020-03-16 RX ORDER — LISINOPRIL 10 MG/1
10 TABLET ORAL DAILY
Qty: 90 TABLET | Refills: 3 | Status: SHIPPED | OUTPATIENT
Start: 2020-03-16 | End: 2021-03-08 | Stop reason: SDUPTHER

## 2020-03-16 RX ORDER — OMEPRAZOLE 20 MG/1
20 CAPSULE, DELAYED RELEASE ORAL DAILY
Qty: 90 CAPSULE | Refills: 3 | Status: SHIPPED | OUTPATIENT
Start: 2020-03-16 | End: 2021-03-08 | Stop reason: SDUPTHER

## 2020-06-04 ENCOUNTER — PROCEDURE VISIT (OUTPATIENT)
Dept: UROLOGY | Age: 75
End: 2020-06-04
Payer: MEDICARE

## 2020-06-04 VITALS — HEIGHT: 72 IN | BODY MASS INDEX: 31.65 KG/M2 | TEMPERATURE: 98.6 F | WEIGHT: 233.7 LBS

## 2020-06-04 LAB
BILIRUBIN URINE: NEGATIVE
BLOOD URINE, POC: NEGATIVE
CHARACTER, URINE: CLEAR
COLOR, URINE: YELLOW
GLUCOSE URINE: NEGATIVE MG/DL
KETONES, URINE: NEGATIVE
LEUKOCYTE CLUMPS, URINE: ABNORMAL
NITRITE, URINE: NEGATIVE
PH, URINE: 6.5 (ref 5–9)
PROTEIN, URINE: NEGATIVE MG/DL
SPECIFIC GRAVITY, URINE: 1.01 (ref 1–1.03)
UROBILINOGEN, URINE: 0.2 EU/DL (ref 0–1)

## 2020-06-04 PROCEDURE — 99213 OFFICE O/P EST LOW 20 MIN: CPT | Performed by: UROLOGY

## 2020-06-04 PROCEDURE — G8417 CALC BMI ABV UP PARAM F/U: HCPCS | Performed by: UROLOGY

## 2020-06-04 PROCEDURE — 81003 URINALYSIS AUTO W/O SCOPE: CPT | Performed by: UROLOGY

## 2020-06-04 PROCEDURE — 1123F ACP DISCUSS/DSCN MKR DOCD: CPT | Performed by: UROLOGY

## 2020-06-04 PROCEDURE — 4040F PNEUMOC VAC/ADMIN/RCVD: CPT | Performed by: UROLOGY

## 2020-06-04 PROCEDURE — 3017F COLORECTAL CA SCREEN DOC REV: CPT | Performed by: UROLOGY

## 2020-06-04 PROCEDURE — G8427 DOCREV CUR MEDS BY ELIG CLIN: HCPCS | Performed by: UROLOGY

## 2020-06-04 PROCEDURE — 1036F TOBACCO NON-USER: CPT | Performed by: UROLOGY

## 2020-06-04 PROCEDURE — 52000 CYSTOURETHROSCOPY: CPT | Performed by: UROLOGY

## 2020-06-08 ENCOUNTER — OFFICE VISIT (OUTPATIENT)
Dept: FAMILY MEDICINE CLINIC | Age: 75
End: 2020-06-08
Payer: MEDICARE

## 2020-06-08 VITALS
HEART RATE: 68 BPM | HEIGHT: 72 IN | DIASTOLIC BLOOD PRESSURE: 68 MMHG | BODY MASS INDEX: 31.42 KG/M2 | TEMPERATURE: 97.4 F | RESPIRATION RATE: 20 BRPM | WEIGHT: 232 LBS | SYSTOLIC BLOOD PRESSURE: 114 MMHG

## 2020-06-08 PROCEDURE — 3017F COLORECTAL CA SCREEN DOC REV: CPT | Performed by: FAMILY MEDICINE

## 2020-06-08 PROCEDURE — 4040F PNEUMOC VAC/ADMIN/RCVD: CPT | Performed by: FAMILY MEDICINE

## 2020-06-08 PROCEDURE — 1123F ACP DISCUSS/DSCN MKR DOCD: CPT | Performed by: FAMILY MEDICINE

## 2020-06-08 PROCEDURE — 96372 THER/PROPH/DIAG INJ SC/IM: CPT | Performed by: FAMILY MEDICINE

## 2020-06-08 PROCEDURE — G0438 PPPS, INITIAL VISIT: HCPCS | Performed by: FAMILY MEDICINE

## 2020-06-08 RX ORDER — GLUCOSAMINE HCL/CHONDROITIN SU 500-400 MG
CAPSULE ORAL
Qty: 100 STRIP | Refills: 3 | Status: SHIPPED | OUTPATIENT
Start: 2020-06-08 | End: 2021-09-27

## 2020-06-08 RX ORDER — METHYLPREDNISOLONE ACETATE 80 MG/ML
160 INJECTION, SUSPENSION INTRA-ARTICULAR; INTRALESIONAL; INTRAMUSCULAR; SOFT TISSUE ONCE
Status: COMPLETED | OUTPATIENT
Start: 2020-06-08 | End: 2020-06-08

## 2020-06-08 RX ORDER — HYOSCYAMINE SULFATE 0.125 MG
0.12 TABLET,DISINTEGRATING ORAL EVERY 4 HOURS PRN
Qty: 360 TABLET | Refills: 3 | Status: SHIPPED | OUTPATIENT
Start: 2020-06-08 | End: 2021-12-09 | Stop reason: SDUPTHER

## 2020-06-08 RX ADMIN — METHYLPREDNISOLONE ACETATE 160 MG: 80 INJECTION, SUSPENSION INTRA-ARTICULAR; INTRALESIONAL; INTRAMUSCULAR; SOFT TISSUE at 14:10

## 2020-06-08 ASSESSMENT — LIFESTYLE VARIABLES
HOW OFTEN DO YOU HAVE SIX OR MORE DRINKS ON ONE OCCASION: 0
HOW MANY STANDARD DRINKS CONTAINING ALCOHOL DO YOU HAVE ON A TYPICAL DAY: 0
HOW OFTEN DO YOU HAVE A DRINK CONTAINING ALCOHOL: 2
AUDIT-C TOTAL SCORE: 2

## 2020-06-08 ASSESSMENT — PATIENT HEALTH QUESTIONNAIRE - PHQ9
SUM OF ALL RESPONSES TO PHQ QUESTIONS 1-9: 0
SUM OF ALL RESPONSES TO PHQ QUESTIONS 1-9: 0

## 2020-06-08 NOTE — PATIENT INSTRUCTIONS
Personalized Preventive Plan for Hanna Taveras - 6/8/2020  Medicare offers a range of preventive health benefits. Some of the tests and screenings are paid in full while other may be subject to a deductible, co-insurance, and/or copay. Some of these benefits include a comprehensive review of your medical history including lifestyle, illnesses that may run in your family, and various assessments and screenings as appropriate. After reviewing your medical record and screening and assessments performed today your provider may have ordered immunizations, labs, imaging, and/or referrals for you. A list of these orders (if applicable) as well as your Preventive Care list are included within your After Visit Summary for your review. Other Preventive Recommendations:    · A preventive eye exam performed by an eye specialist is recommended every 1-2 years to screen for glaucoma; cataracts, macular degeneration, and other eye disorders. · A preventive dental visit is recommended every 6 months. · Try to get at least 150 minutes of exercise per week or 10,000 steps per day on a pedometer . · Order or download the FREE \"Exercise & Physical Activity: Your Everyday Guide\" from [employer]. Call 2-162.885.9552 or search [employer] online. · You need 6941-3207 mg of calcium and 5749-3911 IU of vitamin D per day. It is possible to meet your calcium requirement with diet alone, but a vitamin D supplement is usually necessary to meet this goal.  · When exposed to the sun, use a sunscreen that protects against both UVA and UVB radiation with an SPF of 30 or greater. Reapply every 2 to 3 hours or after sweating, drying off with a towel, or swimming. · Always wear a seat belt when traveling in a car. Always wear a helmet when riding a bicycle or motorcycle.

## 2020-06-08 NOTE — PROGRESS NOTES
Medicare Annual Wellness Visit  Name: Jossy Naidu Date: 2020   MRN: [id number] Sex: Male   Age: 75 y.o. Ethnicity: Non-/Non    : 1945 Race: Jannet Jolly is here for Medicare AWV; Discuss Labs (from March); and Medication Refill    Screenings for behavioral, psychosocial and functional/safety risks, and cognitive dysfunction are all negative except as indicated below. These results, as well as other patient data from the [address] form, are documented in Flowsheets linked to this Encounter. Allergies   Allergen Reactions    Avodart [Dutasteride] Other (See Comments)     Chest tightness       Prior to Visit Medications    Medication Sig Taking?  Authorizing Provider   UNABLE TO FIND Apple cider vinegar 625mg daily Yes Historical Provider, MD   mirabegron (MYRBETRIQ) 25 MG TB24 Take 25 mg by mouth daily Yes Historical Provider, MD   blood glucose monitor strips True Metrix Test Strips, test QD Diagnosis:E11.9 Yes Tatyana Willingham MD   hyoscyamine (NULEV) 125 MCG TBDP dispersible tablet Take 1 tablet by mouth every 4 hours as needed (abd pain) Yes Tatyana Willingham MD   lisinopril (PRINIVIL;ZESTRIL) 10 MG tablet Take 1 tablet by mouth daily Yes Tatyana Willingham MD   omeprazole (PRILOSEC) 20 MG delayed release capsule Take 1 capsule by mouth daily Yes Tatyana Willingham MD   Psyllium (METAMUCIL FIBER PO) Take 2 tablets by mouth daily Yes Historical Provider, MD   tamsulosin (FLOMAX) 0.4 MG capsule Take 2 capsules by mouth nightly Yes Tatyana Willingham MD   atorvastatin (LIPITOR) 40 MG tablet Take 1 tablet by mouth nightly Yes Tatyana Willingham MD   dicyclomine (BENTYL) 20 MG tablet Take 1 tablet by mouth 2 times daily Yes Tatyana Willingham MD   metoprolol tartrate (LOPRESSOR) 25 MG tablet Take 1 tablet by mouth daily Yes Tatyana Willingham MD   Blood Glucose Calibration (CONTROL) Normal SOLN True Metrix Control Solution, Diagnosis:E11.9 Yes Tatyana Willingham MD 9/17/2019    GERD (gastroesophageal reflux disease)     Hyperglycemia 3/18/2016    Hyperlipidemia     Hypertension     IBS (irritable bowel syndrome)     Kidney stones     Malignant neoplasm of trigone of urinary bladder (HCC)     Obstructive sleep apnea     wears cpap    Prostatitis        Past Surgical History:   Procedure Laterality Date    ABDOMEN SURGERY      BACK SURGERY  07/08/2013    Lumbar Coyle L2-S-1, revision, PLIF L5-S-1 w/ grafting    CARDIAC CATHETERIZATION  9/2007    COLON SURGERY  2008    Dr. William Childs  2011? Dr. Lou Camara  2007    CYSTOSCOPY N/A 2/28/2020    CYSTOSCOPY TRANSURETHRAL RESECTION BLADDER TUMOR performed by Maxine Kauffman MD at [facility], ESOPHAGUS      ENDOSCOPY, Harvie Graft  2005    Dr. Katherine Still Right 3/25/2014    right total hip    OTHER SURGICAL HISTORY  08/25/2016    TURBT by Dr Lyla Townsend       Family History   Problem Relation Age of Onset    Heart Disease Mother     Diabetes Mother     Kidney Disease Father     Cancer Brother         colon       CareTeam (Including outside providers/suppliers regularly involved in providing care):   Patient Care Team:  Deepika Rodriguez MD as PCP - General (Family Medicine)  Deepika Rodriguez MD as PCP - [facility] Empaneled Provider  Maxine Kauffman MD as Consulting Physician (Urology)  Yolette Goff MD as Consulting Physician (Cardiology)    Wt Readings from Last 3 Encounters:   06/08/20 232 lb (105.2 kg)   06/04/20 233 lb 11.2 oz (106 kg)   03/05/20 226 lb (102.5 kg)     Vitals:    06/08/20 1352   BP: 114/68   Pulse: 68   Resp: 20   Temp: 97.4 °F (36.3 °C)   TempSrc: Oral   Weight: 232 lb (105.2 kg)   Height: 5' 11.5\" (1.816 m)     Body mass index is 31.91 kg/m². Based upon direct observation of the patient, evaluation of cognition reveals recent and remote memory intact.     General Appearance: alert and oriented to person, place and time, well developed and well- nourished, in no acute distress  Skin: warm and dry, no rash or erythema  Head: normocephalic and atraumatic  Eyes: pupils equal, round, and reactive to light, extraocular eye movements intact, conjunctivae normal  ENT: tympanic membrane, external ear and ear canal normal bilaterally, nose without deformity, nasal mucosa and turbinates normal without polyps  Neck: supple and non-tender without mass, no thyromegaly or thyroid nodules, no cervical lymphadenopathy  Pulmonary/Chest: clear to auscultation bilaterally- no wheezes, rales or rhonchi, normal air movement, no respiratory distress  Cardiovascular: normal rate, regular rhythm, normal S1 and S2, no murmurs, rubs, clicks, or gallops, distal pulses intact, no carotid bruits  Abdomen: soft, non-tender, non-distended, normal bowel sounds, no masses or organomegaly  Extremities: no cyanosis, clubbing or edema  Musculoskeletal: normal range of motion, no joint swelling, deformity or tenderness  Neurologic: reflexes normal and symmetric, no cranial nerve deficit, gait, coordination and speech normal    Patient's complete Health Risk Assessment and screening values have been reviewed and are found in Flowsheets. The following problems were reviewed today and where indicated follow up appointments were made and/or referrals ordered. Positive Risk Factor Screenings with Interventions:     Cognitive:   Words recalled: 0 Words Recalled  Clock Drawing Test (CDT) Score: Normal  Total Score Interpretation: Positive Mini-Cog  Cognitive Impairment Interventions:  · Patient declines any further evaluation/treatment for cognitive impairment    Health Habits/Nutrition:  Health Habits/Nutrition  Do you exercise for at least 20 minutes 2-3 times per week?: Yes  Have you lost any weight without trying in the past 3 months?: No  Do you eat fewer than 2 meals per day?: No  Have you seen a dentist within the past

## 2020-06-15 ENCOUNTER — TELEPHONE (OUTPATIENT)
Dept: FAMILY MEDICINE CLINIC | Age: 75
End: 2020-06-15

## 2020-06-25 ENCOUNTER — VIRTUAL VISIT (OUTPATIENT)
Dept: UROLOGY | Age: 75
End: 2020-06-25
Payer: MEDICARE

## 2020-06-25 PROCEDURE — 99441 PR PHYS/QHP TELEPHONE EVALUATION 5-10 MIN: CPT | Performed by: UROLOGY

## 2020-07-16 ENCOUNTER — VIRTUAL VISIT (OUTPATIENT)
Dept: UROLOGY | Age: 75
End: 2020-07-16
Payer: MEDICARE

## 2020-07-16 PROCEDURE — 99441 PR PHYS/QHP TELEPHONE EVALUATION 5-10 MIN: CPT | Performed by: UROLOGY

## 2020-09-10 ENCOUNTER — PROCEDURE VISIT (OUTPATIENT)
Dept: UROLOGY | Age: 75
End: 2020-09-10
Payer: MEDICARE

## 2020-09-10 VITALS — HEIGHT: 72 IN | BODY MASS INDEX: 31.87 KG/M2 | WEIGHT: 235.3 LBS | TEMPERATURE: 96 F

## 2020-09-10 PROCEDURE — 1036F TOBACCO NON-USER: CPT | Performed by: UROLOGY

## 2020-09-10 PROCEDURE — G8427 DOCREV CUR MEDS BY ELIG CLIN: HCPCS | Performed by: UROLOGY

## 2020-09-10 PROCEDURE — 1123F ACP DISCUSS/DSCN MKR DOCD: CPT | Performed by: UROLOGY

## 2020-09-10 PROCEDURE — 3017F COLORECTAL CA SCREEN DOC REV: CPT | Performed by: UROLOGY

## 2020-09-10 PROCEDURE — 52000 CYSTOURETHROSCOPY: CPT | Performed by: UROLOGY

## 2020-09-10 PROCEDURE — 99213 OFFICE O/P EST LOW 20 MIN: CPT | Performed by: UROLOGY

## 2020-09-10 PROCEDURE — G8417 CALC BMI ABV UP PARAM F/U: HCPCS | Performed by: UROLOGY

## 2020-09-10 PROCEDURE — 81003 URINALYSIS AUTO W/O SCOPE: CPT | Performed by: UROLOGY

## 2020-09-10 PROCEDURE — 4040F PNEUMOC VAC/ADMIN/RCVD: CPT | Performed by: UROLOGY

## 2020-09-10 RX ORDER — ASPIRIN 81 MG/1
81 TABLET ORAL DAILY
COMMUNITY

## 2020-09-10 RX ORDER — VIT A/VIT C/VIT E/ZINC/COPPER 2148-113
TABLET ORAL 2 TIMES DAILY
COMMUNITY

## 2020-09-10 RX ORDER — TROSPIUM CHLORIDE 20 MG/1
20 TABLET, FILM COATED ORAL 2 TIMES DAILY
Qty: 60 TABLET | Refills: 5 | Status: SHIPPED | OUTPATIENT
Start: 2020-09-10 | End: 2021-03-08 | Stop reason: SDUPTHER

## 2020-09-10 NOTE — PROGRESS NOTES
76-year-old white male returns today for surveillance cystoscopy. He has a history of low stage low-grade urothelial cancer diagnosed in 2017 he had 1 recurrence in February 2020. He also has BPH/LUTS. I did put him on Myrbetriq but he developed a rash and he has discontinued this. He continues to take Flomax 0.4 mg twice daily. He still complains of urgency and frequency. He has nocturia x2. Cystoscopy  After obtaining informed consent and prepping the urethral meatus, a 16-Macedonian flexible cystoscope was passed per urethra into the bladder. The urethra was evaluated on the way in and then again on the way out and was found to be normal.  The prostate was moderately obstructing, perhaps 35 g of lateral lobe adenoma. The bladder was evaluated in its endoscopic entirety. There is 1+ trabeculation with scattered cellules present. 1 or 2 shallow diverticuli are present. There were no tumors, stones, ulcers or foreign bodies. The mucosa is slightly raised and a few areas of the bladder which I saw 3 months ago. This is not changed at all. The ureteral orifices were seen and were normal.  The scope was removed. The patient tolerated the procedure and there were no complications. A dose of 500 mg ciprofloxacin was given for the procedure. Impression: No evidence of recurrent urothelial carcinoma. 2.  BPH with obstruction/LUTS. Urge and frequency. Plan:sanctura 20 mg bid for 1 month. Return then. Side effects reviewed. In excess of 15 minutes was spent with the patient discussing their medical history, treatment outcomes and possible treatment related side effects. Greater than 50 per cent of this time was spent in face to face discussion of current disease status and ongoing management.

## 2020-09-24 ENCOUNTER — TELEPHONE (OUTPATIENT)
Dept: UROLOGY | Age: 75
End: 2020-09-24

## 2020-09-24 NOTE — TELEPHONE ENCOUNTER
Prior Auth initiated for Trospium . PA sent to [facility]. Should receive response in 3-5 days.

## 2020-09-24 NOTE — TELEPHONE ENCOUNTER
Patient cannot take Lavenia Ego or oxybutynin due to his age and potential for confusion, delirium and cognitive impairment.   If we need to, I need to speak to an insurance rep on this

## 2020-09-24 NOTE — TELEPHONE ENCOUNTER
I put that on the form about the BEERS criteria. I will see if I can get you a pier to pier with them.

## 2020-10-05 RX ORDER — BLOOD-GLUCOSE METER
EACH MISCELLANEOUS
Qty: 1 EACH | Refills: 3 | Status: SHIPPED | OUTPATIENT
Start: 2020-10-05 | End: 2021-09-27

## 2020-10-05 RX ORDER — GLUCOSAMINE HCL/CHONDROITIN SU 500-400 MG
CAPSULE ORAL
Qty: 50 STRIP | Refills: 3 | Status: SHIPPED | OUTPATIENT
Start: 2020-10-05 | End: 2021-09-27

## 2020-10-07 ENCOUNTER — NURSE ONLY (OUTPATIENT)
Dept: FAMILY MEDICINE CLINIC | Age: 75
End: 2020-10-07
Payer: MEDICARE

## 2020-10-07 PROCEDURE — 90694 VACC AIIV4 NO PRSRV 0.5ML IM: CPT | Performed by: FAMILY MEDICINE

## 2020-10-07 PROCEDURE — G0008 ADMIN INFLUENZA VIRUS VAC: HCPCS | Performed by: FAMILY MEDICINE

## 2020-10-07 NOTE — PROGRESS NOTES
Immunization(s) given during visit:    Immunizations Administered     Name Date Dose Route    Influenza, Quadv, adjuvanted, 65 yrs +, IM, PF (Fluad) 10/7/2020 0.5 mL Intramuscular    Site: Deltoid- Left    Lot: [id number]    NDC: 82793-777-84          Most recent Vaccine Information Sheet dated 10-7-2020 given to pt

## 2020-10-15 ENCOUNTER — OFFICE VISIT (OUTPATIENT)
Dept: UROLOGY | Age: 75
End: 2020-10-15
Payer: MEDICARE

## 2020-10-15 VITALS — HEIGHT: 72 IN | BODY MASS INDEX: 31.83 KG/M2 | TEMPERATURE: 96.3 F | WEIGHT: 235 LBS

## 2020-10-15 LAB
BILIRUBIN URINE: NEGATIVE
BLOOD URINE, POC: NEGATIVE
CHARACTER, URINE: CLEAR
COLOR, URINE: YELLOW
GLUCOSE URINE: NEGATIVE MG/DL
KETONES, URINE: NEGATIVE
LEUKOCYTE CLUMPS, URINE: ABNORMAL
NITRITE, URINE: NEGATIVE
PH, URINE: 5.5 (ref 5–9)
POST VOID RESIDUAL (PVR): 0 ML
PROTEIN, URINE: NEGATIVE MG/DL
SPECIFIC GRAVITY, URINE: 1.02 (ref 1–1.03)
UROBILINOGEN, URINE: 0.2 EU/DL (ref 0–1)

## 2020-10-15 PROCEDURE — G8484 FLU IMMUNIZE NO ADMIN: HCPCS | Performed by: UROLOGY

## 2020-10-15 PROCEDURE — 4040F PNEUMOC VAC/ADMIN/RCVD: CPT | Performed by: UROLOGY

## 2020-10-15 PROCEDURE — 99213 OFFICE O/P EST LOW 20 MIN: CPT | Performed by: UROLOGY

## 2020-10-15 PROCEDURE — G8427 DOCREV CUR MEDS BY ELIG CLIN: HCPCS | Performed by: UROLOGY

## 2020-10-15 PROCEDURE — 1123F ACP DISCUSS/DSCN MKR DOCD: CPT | Performed by: UROLOGY

## 2020-10-15 PROCEDURE — G8417 CALC BMI ABV UP PARAM F/U: HCPCS | Performed by: UROLOGY

## 2020-10-15 PROCEDURE — 51798 US URINE CAPACITY MEASURE: CPT | Performed by: UROLOGY

## 2020-10-15 PROCEDURE — 1036F TOBACCO NON-USER: CPT | Performed by: UROLOGY

## 2020-10-15 NOTE — PROGRESS NOTES
13-year-old white male returns today for a follow-up. He has a history of low-grade low stage urothelial carcinoma the bladder. His last surveillance cystoscopy in September, 2020 showed no recurrence. His next cystoscopy is due in March, 2021. He has chronic irritative voiding symptoms. His prostate is mildly enlarged/obstructing. For his symptoms he is on tamsulosin, 0.8 mg daily and Sanctura 20 mg twice daily. Patient has benefited from the trospium. Rare nocturia. UA: neg;  PVR:0 ml  Will continue both meds until I see him back in March, 2021 for his bladder cancer surveillance. In excess of 15 minutes was spent with the patient discussing their medical history, treatment outcomes and possible treatment related side effects. Greater than 50 per cent of this time was spent in face to face discussion of current disease status and ongoing management.

## 2020-10-23 RX ORDER — DICYCLOMINE HCL 20 MG
20 TABLET ORAL 2 TIMES DAILY
Qty: 180 TABLET | Refills: 1 | Status: SHIPPED | OUTPATIENT
Start: 2020-10-23 | End: 2021-03-08 | Stop reason: SDUPTHER

## 2020-10-23 RX ORDER — ATORVASTATIN CALCIUM 40 MG/1
40 TABLET, FILM COATED ORAL NIGHTLY
Qty: 90 TABLET | Refills: 1 | Status: SHIPPED | OUTPATIENT
Start: 2020-10-23 | End: 2021-03-08 | Stop reason: SDUPTHER

## 2020-10-27 NOTE — TELEPHONE ENCOUNTER
Sent an appeal in for the Trospium. Received a letter today that they overturned their decision and it is now approved.  Pt notified

## 2020-11-03 PROBLEM — R07.9 CHEST PAIN: Status: RESOLVED | Noted: 2018-08-17 | Resolved: 2020-11-03

## 2020-12-02 LAB
ABSOLUTE BASO #: 0.1 X10E9/L (ref 0–0.2)
ABSOLUTE EOS #: 0.3 X10E9/L (ref 0–0.4)
ABSOLUTE LYMPH #: 3.7 X10E9/L (ref 1–3.5)
ABSOLUTE MONO #: 0.7 X10E9/L (ref 0–0.9)
ABSOLUTE NEUT #: 3.6 X10E9/L (ref 1.5–6.6)
ALBUMIN SERPL-MCNC: 4.3 G/DL (ref 3.2–5.3)
ALK PHOSPHATASE: 76 U/L (ref 39–130)
ALT SERPL-CCNC: 19 U/L (ref 0–40)
ANION GAP SERPL CALCULATED.3IONS-SCNC: 8 MMOL/L (ref 5–15)
AST SERPL-CCNC: 26 U/L (ref 0–41)
AVERAGE GLUCOSE: 114 MG/DL
BASOPHILS RELATIVE PERCENT: 0.7 %
BILIRUB SERPL-MCNC: 1.5 MG/DL (ref 0.3–1.2)
BUN BLDV-MCNC: 19 MG/DL (ref 5–27)
CALCIUM SERPL-MCNC: 8.9 MG/DL (ref 8.5–10.5)
CHLORIDE BLD-SCNC: 108 MMOL/L (ref 98–109)
CHOLESTEROL/HDL RATIO: 2.4 (ref 1–5)
CHOLESTEROL: 131 MG/DL (ref 150–200)
CO2: 27 MMOL/L (ref 22–32)
CREAT SERPL-MCNC: 0.98 MG/DL (ref 0.6–1.3)
EGFR AFRICAN AMERICAN: >60 ML/MIN/1.73SQ.M
EGFR IF NONAFRICAN AMERICAN: >60 ML/MIN/1.73SQ.M
EOSINOPHILS RELATIVE PERCENT: 3.2 %
GLUCOSE: 115 MG/DL (ref 65–99)
HBA1C MFR BLD: 5.6 % (ref 4.4–6.4)
HCT VFR BLD CALC: 42.1 % (ref 39–49)
HDLC SERPL-MCNC: 54 MG/DL
HEMOGLOBIN: 14.2 G/DL (ref 13–17)
LDL CHOLESTEROL CALCULATED: 62 MG/DL
LDL/HDL RATIO: 1.1
LYMPHOCYTE %: 44.5 %
MCH RBC QN AUTO: 30.7 PG (ref 27–34)
MCHC RBC AUTO-ENTMCNC: 33.8 G/DL (ref 32–36)
MCV RBC AUTO: 91 FL (ref 80–100)
MONOCYTES # BLD: 8.9 %
NEUTROPHILS RELATIVE PERCENT: 42.7 %
PDW BLD-RTO: 12.9 % (ref 11.5–15)
PLATELETS: 183 X10E9/L (ref 150–450)
PMV BLD AUTO: 9.3 FL (ref 7–12)
POTASSIUM SERPL-SCNC: 4 MMOL/L (ref 3.5–5)
RBC: 4.62 X10E12/L (ref 4.1–5.7)
SODIUM BLD-SCNC: 143 MMOL/L (ref 134–146)
TOTAL PROTEIN: 6.9 G/DL (ref 6–8)
TRIGL SERPL-MCNC: 74 MG/DL (ref 27–150)
VLDLC SERPL CALC-MCNC: 15 MG/DL (ref 0–30)
WBC: 8.4 X10E9/L (ref 4–11)

## 2020-12-08 ENCOUNTER — OFFICE VISIT (OUTPATIENT)
Dept: FAMILY MEDICINE CLINIC | Age: 75
End: 2020-12-08
Payer: MEDICARE

## 2020-12-08 VITALS
HEART RATE: 64 BPM | WEIGHT: 238.6 LBS | TEMPERATURE: 96.8 F | DIASTOLIC BLOOD PRESSURE: 62 MMHG | RESPIRATION RATE: 18 BRPM | OXYGEN SATURATION: 97 % | SYSTOLIC BLOOD PRESSURE: 110 MMHG | BODY MASS INDEX: 32.36 KG/M2

## 2020-12-08 PROBLEM — Z85.51 HISTORY OF BLADDER CANCER: Status: ACTIVE | Noted: 2020-12-08

## 2020-12-08 PROCEDURE — G8427 DOCREV CUR MEDS BY ELIG CLIN: HCPCS | Performed by: FAMILY MEDICINE

## 2020-12-08 PROCEDURE — 4040F PNEUMOC VAC/ADMIN/RCVD: CPT | Performed by: FAMILY MEDICINE

## 2020-12-08 PROCEDURE — 1123F ACP DISCUSS/DSCN MKR DOCD: CPT | Performed by: FAMILY MEDICINE

## 2020-12-08 PROCEDURE — G8417 CALC BMI ABV UP PARAM F/U: HCPCS | Performed by: FAMILY MEDICINE

## 2020-12-08 PROCEDURE — 99214 OFFICE O/P EST MOD 30 MIN: CPT | Performed by: FAMILY MEDICINE

## 2020-12-08 PROCEDURE — 1036F TOBACCO NON-USER: CPT | Performed by: FAMILY MEDICINE

## 2020-12-08 PROCEDURE — G8484 FLU IMMUNIZE NO ADMIN: HCPCS | Performed by: FAMILY MEDICINE

## 2020-12-08 RX ORDER — TAMSULOSIN HYDROCHLORIDE 0.4 MG/1
0.8 CAPSULE ORAL NIGHTLY
Qty: 180 CAPSULE | Refills: 3 | Status: SHIPPED | OUTPATIENT
Start: 2020-12-08 | End: 2021-12-09 | Stop reason: SDUPTHER

## 2020-12-08 NOTE — PROGRESS NOTES
Diltiazem 2% gel apply to rectal fissure daily as directed 60gm tube with 1rf per [facility]. [facility].. This compound was originally ordered by Dr. Martha Hughes in 2014 at [facility]. Johan Brannon pharmacist at [facility] states they can make this compound-verbal order was given. Left a message on pt's machine informing him he will need to  Rx at [facility].

## 2020-12-13 ASSESSMENT — ENCOUNTER SYMPTOMS
EYE PAIN: 0
ABDOMINAL PAIN: 0
SORE THROAT: 0
ABDOMINAL DISTENTION: 0
RHINORRHEA: 0
SHORTNESS OF BREATH: 0
SINUS PRESSURE: 0
CONSTIPATION: 0
DIARRHEA: 0
NAUSEA: 0
COUGH: 0

## 2020-12-13 NOTE — PROGRESS NOTES
[address] [address]  Dept: [phone]  Dept Fax: [phone]  Loc: [phone]  PROGRESS NOTE      VisitDate: 12/8/2020    Trey Orta is a 67 y.o. male who presents today for:     Chief Complaint   Patient presents with    6 Month Follow-Up     HTN    Discuss Labs    Other     would like cologuard order         Subjective:  HPI  Follow-up hyperlipidemia, stable well-controlled. Labs reviewed with the patient. History of hyperglycemia A1c has been normal.  Having lower urinary tract symptoms related to BPH. He has a history of bladder cancer as well. Due for colon cancer screening. Needs some refills as well    Review of Systems   Constitutional: Negative for appetite change and fever. HENT: Negative for congestion, ear pain, postnasal drip, rhinorrhea, sinus pressure and sore throat. Eyes: Negative for pain and visual disturbance. Respiratory: Negative for cough and shortness of breath. Cardiovascular: Negative for chest pain. Gastrointestinal: Negative for abdominal distention, abdominal pain, constipation, diarrhea and nausea. Genitourinary: Negative for dysuria, frequency and urgency. Musculoskeletal: Negative for arthralgias. Skin: Negative for rash. Neurological: Negative for dizziness.      Past Medical History:   Diagnosis Date    Arthritis     CAD (coronary artery disease)     Chronic obstructive pulmonary disease ([facility] [address] 9/17/2019    GERD (gastroesophageal reflux disease)     Hyperglycemia 3/18/2016    Hyperlipidemia     Hypertension     IBS (irritable bowel syndrome)     Kidney stones     Malignant neoplasm of trigone of urinary bladder (HCC)     Obstructive sleep apnea     wears cpap    Prostatitis       Past Surgical History:   Procedure Laterality Date    ABDOMEN SURGERY      BACK SURGERY  07/08/2013    Lumbar Coyle L2-S-1, revision, PLIF L5-S-1 w/ grafting  CARDIAC CATHETERIZATION  2007    COLON SURGERY      Dr. Sander Martinez  ? Dr. Jenaro Dove      CYSTOSCOPY N/A 2020    CYSTOSCOPY TRANSURETHRAL RESECTION BLADDER TUMOR performed by Ever Sanchez MD at [address], ESOPHAGUS      ENDOSCOPY, COLON, DIAGNOSTIC     [facility]      Dr. Radha Johnson Right 3/25/2014    right total hip    OTHER SURGICAL HISTORY  2016    TURBT by Dr Burgess Diaz     Family History   Problem Relation Age of Onset    Heart Disease Mother     Diabetes Mother     Kidney Disease Father     Cancer Brother         colon     Social History     Tobacco Use    Smoking status: Former Smoker     Packs/day: 1.00     Years: 25.00     Pack years: 25.00     Quit date: 1995     Years since quittin.9    Smokeless tobacco: Former User   Substance Use Topics    Alcohol use:  Yes     Alcohol/week: 1.0 standard drinks     Types: 1 Cans of beer per week     Comment: occas      Current Outpatient Medications   Medication Sig Dispense Refill    tamsulosin (FLOMAX) 0.4 MG capsule Take 2 capsules by mouth nightly 180 capsule 3    UNABLE TO FIND Diltiazem 2% gel apply to rectal fissure daily as directed 60 g 1    atorvastatin (LIPITOR) 40 MG tablet Take 1 tablet by mouth nightly 90 tablet 1    dicyclomine (BENTYL) 20 MG tablet Take 1 tablet by mouth 2 times daily 180 tablet 1    blood glucose monitor strips [employer] True Metrix Test strips Test BS's every two weeks as directed Dx: R73.9 50 strip 3    Blood Glucose Calibration (CONTROL) Normal SOLN True Metrix Control Solution, Diagnosis:E11.9 1 each 3    metoprolol tartrate (LOPRESSOR) 25 MG tablet Take 1 tablet by mouth daily 90 tablet 3    aspirin 81 MG EC tablet Take 81 mg by mouth daily      Multiple Vitamins-Minerals (PRESERVISION AREDS) TABS Take by mouth  trospium (SANCTURA) 20 MG tablet Take 1 tablet by mouth 2 times daily 60 tablet 5    UNABLE TO FIND Apple cider vinegar 625mg daily      blood glucose monitor strips True Metrix Test Strips, test QD Diagnosis:E11.9 100 strip 3    hyoscyamine (NULEV) 125 MCG TBDP dispersible tablet Take 1 tablet by mouth every 4 hours as needed (abd pain) 360 tablet 3    lisinopril (PRINIVIL;ZESTRIL) 10 MG tablet Take 1 tablet by mouth daily 90 tablet 3    omeprazole (PRILOSEC) 20 MG delayed release capsule Take 1 capsule by mouth daily 90 capsule 3    Psyllium (METAMUCIL FIBER PO) Take 2 tablets by mouth daily      NONFORMULARY Take 450 mg by mouth 2 times daily      nitroGLYCERIN (NITROSTAT) 0.4 MG SL tablet Place 1 tablet under the tongue every 5 minutes as needed for Chest pain 25 tablet 5    Cinnamon 500 MG CAPS Take 500 mg by mouth Daily      Milk Thistle 1000 MG CAPS Take 1,000 mg by mouth Daily      Lancets MISC TruePlus Super Thin 28G lancets Test BS's every 2 weeks as directed Dx: R73.9 50 each 11    Ubiquinol 100 MG CAPS Take 1 tablet by mouth daily.  Bilberry 100 MG CAPS Take 100 mg by mouth Daily       fish oil-omega-3 fatty acids 1000 MG capsule Take 1 g by mouth daily.  Chromium Picolinate 500 MCG TABS Take 500 mg by mouth daily.  Vitamin E 400 UNIT TABS Take 400 mg by mouth daily.  Ascorbic Acid (VITAMIN C CR) 500 MG TBCR Take 500 mg by mouth daily.  Methylsulfonylmethane (MSM) 1500 MG TABS Take 1,500 mg by mouth daily.  Lycopene 10 MG CAPS Take 20 mg by mouth daily.  Multiple Vitamin (MULTIVITAMIN PO) Take 1 tablet by mouth daily.  Cholecalciferol (VITAMIN D3) 2000 UNIT TABS Take 2,000 mg by mouth daily.  Pyridoxine HCl (VITAMIN B-6) 50 MG tablet Take 50 mg by mouth 2 times daily.  Vitamins-Lipotropics (BALANCED B-50 COMPLEX PO) Take  by mouth 2 times daily. No current facility-administered medications for this visit.

## 2020-12-17 ENCOUNTER — TELEPHONE (OUTPATIENT)
Dept: FAMILY MEDICINE CLINIC | Age: 75
End: 2020-12-17

## 2020-12-17 NOTE — TELEPHONE ENCOUNTER
----- Message from Tiffanie Jiménez MD sent at 12/17/2020  9:12 AM EST -----  Positive cologuard, refer to GI

## 2021-02-02 ENCOUNTER — OFFICE VISIT (OUTPATIENT)
Dept: PULMONOLOGY | Age: 76
End: 2021-02-02
Payer: MEDICARE

## 2021-02-02 VITALS
HEIGHT: 72 IN | OXYGEN SATURATION: 97 % | TEMPERATURE: 96.9 F | BODY MASS INDEX: 32.91 KG/M2 | DIASTOLIC BLOOD PRESSURE: 84 MMHG | WEIGHT: 243 LBS | HEART RATE: 60 BPM | SYSTOLIC BLOOD PRESSURE: 132 MMHG

## 2021-02-02 DIAGNOSIS — Z99.89 OBSTRUCTIVE SLEEP APNEA ON CPAP: Primary | ICD-10-CM

## 2021-02-02 DIAGNOSIS — E66.9 OBESITY (BMI 30-39.9): ICD-10-CM

## 2021-02-02 DIAGNOSIS — G47.33 OBSTRUCTIVE SLEEP APNEA ON CPAP: Primary | ICD-10-CM

## 2021-02-02 PROCEDURE — 1036F TOBACCO NON-USER: CPT | Performed by: PHYSICIAN ASSISTANT

## 2021-02-02 PROCEDURE — G8427 DOCREV CUR MEDS BY ELIG CLIN: HCPCS | Performed by: PHYSICIAN ASSISTANT

## 2021-02-02 PROCEDURE — G8417 CALC BMI ABV UP PARAM F/U: HCPCS | Performed by: PHYSICIAN ASSISTANT

## 2021-02-02 PROCEDURE — 4040F PNEUMOC VAC/ADMIN/RCVD: CPT | Performed by: PHYSICIAN ASSISTANT

## 2021-02-02 PROCEDURE — 99213 OFFICE O/P EST LOW 20 MIN: CPT | Performed by: PHYSICIAN ASSISTANT

## 2021-02-02 PROCEDURE — G8484 FLU IMMUNIZE NO ADMIN: HCPCS | Performed by: PHYSICIAN ASSISTANT

## 2021-02-02 PROCEDURE — 1123F ACP DISCUSS/DSCN MKR DOCD: CPT | Performed by: PHYSICIAN ASSISTANT

## 2021-02-02 NOTE — PROGRESS NOTES
[facility]      Leeanna Macdonald                                         [id number]  2/2/2021   Chief Complaint   Patient presents with    Sleep Apnea     Follow up,pap wont download          Pt of Dr. Chang Gibbs      Presentation:   Trino Ascencio presents for sleep medicine follow up for obstructive sleep apnea  Since the last visit Trino Ascencio has been doing reasonably well with PAP. His PAP does not download. He was going to get a new PAP last year but was required to get a PSG and he refused. He does not want to leave his wife at home. He states that he sleeps ok and feels rested. Progress History:   Since last visit any new medical issues? Yes bladder cancer  New ER or hospitlal visits? No  Any new or changes in medicines? No  Any new sleep medicines? No    Sleep issues:  Do you feel better? Yes  More rested? Yes   Better concentration? yes      Past Medical History:   Diagnosis Date    Arthritis     CAD (coronary artery disease)     Chronic obstructive pulmonary disease ([address] 9/17/2019    GERD (gastroesophageal reflux disease)     Hyperglycemia 3/18/2016    Hyperlipidemia     Hypertension     IBS (irritable bowel syndrome)     Kidney stones     Malignant neoplasm of trigone of urinary bladder (HCC)     Obstructive sleep apnea     wears cpap    Prostatitis        Past Surgical History:   Procedure Laterality Date    ABDOMEN SURGERY      BACK SURGERY  07/08/2013    Lumbar Coyle L2-S-1, revision, PLIF L5-S-1 w/ grafting    CARDIAC CATHETERIZATION  9/2007    COLON SURGERY  2008    Dr. Claudean Packer  2011?     Dr. Marylene Awkward  2007    CYSTOSCOPY N/A 2/28/2020    CYSTOSCOPY TRANSURETHRAL RESECTION BLADDER TUMOR performed by Kinjal Courtney MD at [facility], ESOPHAGUS      ENDOSCOPY, COLON, DIAGNOSTIC      HERNIA REPAIR  2005    Dr. Astrid Chavez Right 3/25/2014    right total hip    OTHER SURGICAL HISTORY  2016    TURBT by Dr Charleen Johnson       Social History     Tobacco Use    Smoking status: Former Smoker     Packs/day: 1.00     Years: 25.00     Pack years: 25.00     Quit date: 1995     Years since quittin.1    Smokeless tobacco: Former User   Substance Use Topics    Alcohol use:  Yes     Alcohol/week: 1.0 standard drinks     Types: 1 Cans of beer per week     Comment: occas    Drug use: No       Allergies   Allergen Reactions    Avodart [Dutasteride] Other (See Comments)     Chest tightness    Myrbetriq [Mirabegron] Other (See Comments)     Red spots       Current Outpatient Medications   Medication Sig Dispense Refill    tamsulosin (FLOMAX) 0.4 MG capsule Take 2 capsules by mouth nightly 180 capsule 3    UNABLE TO FIND Diltiazem 2% gel apply to rectal fissure daily as directed 60 g 1    atorvastatin (LIPITOR) 40 MG tablet Take 1 tablet by mouth nightly 90 tablet 1    dicyclomine (BENTYL) 20 MG tablet Take 1 tablet by mouth 2 times daily 180 tablet 1    blood glucose monitor strips [employer] True Metrix Test strips Test BS's every two weeks as directed Dx: R73.9 50 strip 3    Blood Glucose Calibration (CONTROL) Normal SOLN True Metrix Control Solution, Diagnosis:E11.9 1 each 3    metoprolol tartrate (LOPRESSOR) 25 MG tablet Take 1 tablet by mouth daily 90 tablet 3    aspirin 81 MG EC tablet Take 81 mg by mouth daily      Multiple Vitamins-Minerals (PRESERVISION AREDS) TABS Take by mouth      trospium (SANCTURA) 20 MG tablet Take 1 tablet by mouth 2 times daily 60 tablet 5    UNABLE TO FIND Apple cider vinegar 625mg daily      blood glucose monitor strips True Metrix Test Strips, test QD Diagnosis:E11.9 100 strip 3    hyoscyamine (NULEV) 125 MCG TBDP dispersible tablet Take 1 tablet by mouth every 4 hours as needed (abd pain) 360 tablet 3    lisinopril (PRINIVIL;ZESTRIL) 10 MG tablet Take 1 tablet by mouth daily 90 tablet 3    omeprazole (PRILOSEC) exertion  Gastrointestinal ROS: no abdominal pain,change in bowel habits, or black or bloody stools  Musculoskeletal ROS: negative  Neurological ROS: negative    Physical Exam:    BMI:  Body mass index is 32.96 kg/m². Wt Readings from Last 3 Encounters:   02/02/21 243 lb (110.2 kg)   12/08/20 238 lb 9.6 oz (108.2 kg)   10/15/20 235 lb (106.6 kg)     Vitals: /84 (Site: Left Upper Arm, Position: Sitting, Cuff Size: Large Adult)   Pulse 60   Temp 96.9 °F (36.1 °C) (Tympanic)   Ht 6' (1.829 m)   Wt 243 lb (110.2 kg)   SpO2 97% Comment: room air  BMI 32.96 kg/m²       Physical Exam  Constitutional:       Appearance: He is well-developed. HENT:      Head: Normocephalic and atraumatic. Right Ear: External ear normal.      Left Ear: External ear normal.   Eyes:      Conjunctiva/sclera: Conjunctivae normal.      Pupils: Pupils are equal, round, and reactive to light. Neck:      Musculoskeletal: Normal range of motion and neck supple. Cardiovascular:      Rate and Rhythm: Normal rate and regular rhythm. Heart sounds: Normal heart sounds. Pulmonary:      Effort: Pulmonary effort is normal.      Breath sounds: Normal breath sounds. Musculoskeletal: Normal range of motion. Skin:     General: Skin is warm and dry. Neurological:      Mental Status: He is alert and oriented to person, place, and time. Psychiatric:         Behavior: Behavior normal.         Thought Content: Thought content normal.         Judgment: Judgment normal.            Assessment      Diagnosis Orders   1. Obstructive sleep apnea on CPAP     2. Obesity (BMI 30-39. 9)            Plan   - he needs a new PAP since his will not download  - Discussed getting a HST as an option for retest and he is agreeable once Covid improves  - Will get through [facility] so he does not have to change DME  - He will call when ready for HST  - Download reviewed and discussed with patient  - He  advised to keep good compliance with current recommended pressure to get optimal results and clinical improvement   - Recommend 7-9 hours of sleep with PAP  - He was advised to call DME company regarding supplies if needed.   -He call my office for earlier appointment if needed for worsening of sleep symptoms.   - He was instructed on weight loss  - Danisha Villavicenciokristopher was educated about my impression and plan. Patient verbalizesunderstanding.   We will see Ezra Lopezalcira back in: 1 year with download or after HST and new PAP    Information added by my medical assistant/LPN was reviewed today    Marcial Treadwell PA-C, MPAS  2/2/2021     Time for visit 20 min

## 2021-02-03 ENCOUNTER — TELEPHONE (OUTPATIENT)
Dept: PULMONOLOGY | Age: 76
End: 2021-02-03

## 2021-02-03 DIAGNOSIS — G47.33 OBSTRUCTIVE SLEEP APNEA ON CPAP: Primary | ICD-10-CM

## 2021-02-03 DIAGNOSIS — Z99.89 OBSTRUCTIVE SLEEP APNEA ON CPAP: Primary | ICD-10-CM

## 2021-02-03 NOTE — TELEPHONE ENCOUNTER
Patient called in stating Monica told him that a new order for a pap needs to go to [facility]. Please advise.

## 2021-02-09 ENCOUNTER — TELEPHONE (OUTPATIENT)
Dept: PULMONOLOGY | Age: 76
End: 2021-02-09

## 2021-02-09 DIAGNOSIS — G47.30 SLEEP APNEA, UNSPECIFIED TYPE: Primary | ICD-10-CM

## 2021-02-09 DIAGNOSIS — Z99.89 OSA ON CPAP: ICD-10-CM

## 2021-02-09 DIAGNOSIS — G47.33 OSA ON CPAP: ICD-10-CM

## 2021-02-09 NOTE — TELEPHONE ENCOUNTER
Pt called in stating that his machine is broken and that Lucas Mellybriseyda had told him he could have a new HST through [facility] and then get his new machine through [facility]. His machine has quit working completely. See Jigna's last note. Please advise.

## 2021-02-09 NOTE — TELEPHONE ENCOUNTER
Home/Portable sleep test was ordred in [address] as per Ms. Iesha Leong PA-C note. Please schedule it and inform patient. Patient to follow with Ms Yum[facility] clinic in 1 week after the Home/Portable sleep test for further management.

## 2021-02-10 ENCOUNTER — TELEPHONE (OUTPATIENT)
Dept: PULMONOLOGY | Age: 76
End: 2021-02-10

## 2021-03-08 ENCOUNTER — OFFICE VISIT (OUTPATIENT)
Dept: FAMILY MEDICINE CLINIC | Age: 76
End: 2021-03-08
Payer: MEDICARE

## 2021-03-08 VITALS
RESPIRATION RATE: 20 BRPM | TEMPERATURE: 97 F | HEART RATE: 64 BPM | BODY MASS INDEX: 32.82 KG/M2 | SYSTOLIC BLOOD PRESSURE: 104 MMHG | DIASTOLIC BLOOD PRESSURE: 70 MMHG | WEIGHT: 242 LBS

## 2021-03-08 DIAGNOSIS — I10 ESSENTIAL HYPERTENSION, BENIGN: Primary | ICD-10-CM

## 2021-03-08 DIAGNOSIS — E78.5 HYPERLIPIDEMIA, UNSPECIFIED HYPERLIPIDEMIA TYPE: ICD-10-CM

## 2021-03-08 DIAGNOSIS — Z85.51 HISTORY OF BLADDER CANCER: ICD-10-CM

## 2021-03-08 PROCEDURE — 99213 OFFICE O/P EST LOW 20 MIN: CPT | Performed by: FAMILY MEDICINE

## 2021-03-08 PROCEDURE — G8484 FLU IMMUNIZE NO ADMIN: HCPCS | Performed by: FAMILY MEDICINE

## 2021-03-08 PROCEDURE — G8427 DOCREV CUR MEDS BY ELIG CLIN: HCPCS | Performed by: FAMILY MEDICINE

## 2021-03-08 PROCEDURE — G8417 CALC BMI ABV UP PARAM F/U: HCPCS | Performed by: FAMILY MEDICINE

## 2021-03-08 PROCEDURE — 1123F ACP DISCUSS/DSCN MKR DOCD: CPT | Performed by: FAMILY MEDICINE

## 2021-03-08 PROCEDURE — 4040F PNEUMOC VAC/ADMIN/RCVD: CPT | Performed by: FAMILY MEDICINE

## 2021-03-08 PROCEDURE — 1036F TOBACCO NON-USER: CPT | Performed by: FAMILY MEDICINE

## 2021-03-08 RX ORDER — ATORVASTATIN CALCIUM 40 MG/1
40 TABLET, FILM COATED ORAL NIGHTLY
Qty: 90 TABLET | Refills: 3 | Status: SHIPPED | OUTPATIENT
Start: 2021-03-08 | End: 2022-05-08 | Stop reason: SDUPTHER

## 2021-03-08 RX ORDER — DICYCLOMINE HCL 20 MG
20 TABLET ORAL 2 TIMES DAILY
Qty: 180 TABLET | Refills: 3 | Status: SHIPPED | OUTPATIENT
Start: 2021-03-08 | End: 2022-04-17 | Stop reason: SDUPTHER

## 2021-03-08 RX ORDER — TROSPIUM CHLORIDE 20 MG/1
20 TABLET, FILM COATED ORAL 2 TIMES DAILY
Qty: 180 TABLET | Refills: 3 | Status: SHIPPED | OUTPATIENT
Start: 2021-03-08 | End: 2021-03-11 | Stop reason: SDUPTHER

## 2021-03-08 RX ORDER — OMEPRAZOLE 20 MG/1
20 CAPSULE, DELAYED RELEASE ORAL DAILY
Qty: 90 CAPSULE | Refills: 3 | Status: SHIPPED | OUTPATIENT
Start: 2021-03-08 | End: 2021-09-27

## 2021-03-08 RX ORDER — LISINOPRIL 10 MG/1
10 TABLET ORAL DAILY
Qty: 90 TABLET | Refills: 3 | Status: SHIPPED | OUTPATIENT
Start: 2021-03-08 | End: 2021-12-09 | Stop reason: SDUPTHER

## 2021-03-11 ENCOUNTER — PROCEDURE VISIT (OUTPATIENT)
Dept: UROLOGY | Age: 76
End: 2021-03-11
Payer: MEDICARE

## 2021-03-11 VITALS — BODY MASS INDEX: 32.89 KG/M2 | TEMPERATURE: 97.2 F | HEIGHT: 72 IN | WEIGHT: 242.8 LBS

## 2021-03-11 DIAGNOSIS — R33.9 INCOMPLETE BLADDER EMPTYING: Primary | ICD-10-CM

## 2021-03-11 DIAGNOSIS — G47.33 OSA ON CPAP: ICD-10-CM

## 2021-03-11 DIAGNOSIS — Z99.89 OSA ON CPAP: ICD-10-CM

## 2021-03-11 DIAGNOSIS — G47.30 SLEEP APNEA, UNSPECIFIED TYPE: ICD-10-CM

## 2021-03-11 DIAGNOSIS — C67.8 MALIGNANT NEOPLASM OF OVERLAPPING SITES OF BLADDER (HCC): ICD-10-CM

## 2021-03-11 LAB
BILIRUBIN URINE: NEGATIVE
BLOOD URINE, POC: NEGATIVE
CHARACTER, URINE: CLEAR
COLOR, URINE: YELLOW
GLUCOSE URINE: NEGATIVE MG/DL
KETONES, URINE: NEGATIVE
LEUKOCYTE CLUMPS, URINE: NEGATIVE
NITRITE, URINE: NEGATIVE
PH, URINE: 5.5 (ref 5–9)
POST VOID RESIDUAL (PVR): 105 ML
PROTEIN, URINE: NEGATIVE MG/DL
SPECIFIC GRAVITY, URINE: 1.02 (ref 1–1.03)
UROBILINOGEN, URINE: 0.2 EU/DL (ref 0–1)

## 2021-03-11 PROCEDURE — G8427 DOCREV CUR MEDS BY ELIG CLIN: HCPCS | Performed by: UROLOGY

## 2021-03-11 PROCEDURE — 4040F PNEUMOC VAC/ADMIN/RCVD: CPT | Performed by: UROLOGY

## 2021-03-11 PROCEDURE — 99212 OFFICE O/P EST SF 10 MIN: CPT | Performed by: UROLOGY

## 2021-03-11 PROCEDURE — 81003 URINALYSIS AUTO W/O SCOPE: CPT | Performed by: UROLOGY

## 2021-03-11 PROCEDURE — 1036F TOBACCO NON-USER: CPT | Performed by: UROLOGY

## 2021-03-11 PROCEDURE — 51798 US URINE CAPACITY MEASURE: CPT | Performed by: UROLOGY

## 2021-03-11 PROCEDURE — 1123F ACP DISCUSS/DSCN MKR DOCD: CPT | Performed by: UROLOGY

## 2021-03-11 PROCEDURE — G8417 CALC BMI ABV UP PARAM F/U: HCPCS | Performed by: UROLOGY

## 2021-03-11 PROCEDURE — 52000 CYSTOURETHROSCOPY: CPT | Performed by: UROLOGY

## 2021-03-11 PROCEDURE — G8484 FLU IMMUNIZE NO ADMIN: HCPCS | Performed by: UROLOGY

## 2021-03-11 RX ORDER — TROSPIUM CHLORIDE 20 MG/1
20 TABLET, FILM COATED ORAL 2 TIMES DAILY
Qty: 180 TABLET | Refills: 3 | Status: SHIPPED | OUTPATIENT
Start: 2021-03-11 | End: 2022-02-28 | Stop reason: SDUPTHER

## 2021-03-11 NOTE — PROGRESS NOTES
10ml of 2% Lidocaine Urojet inserted into the penis and clamped for 5 minutes before cystoscopy procedure. 1 pill of cipro given prior to cystoscopy.

## 2021-03-11 NOTE — PROGRESS NOTES
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 He is on Flomax and Marlen Whitetail and Praful with respectable improvement although not completely satisfied. He feels he does not empty his bladder completely. He has nocturia x2.  UA today: WNL  PVR today: 105 mL. Cystoscopy  After obtaining informed consent and prepping the urethral meatus, a 16-Khmer flexible cystoscope was passed per urethra into the bladder. The urethra was evaluated on the way in and then again on the way out and was found to be normal.  The prostate was moderately obstructing. He has a 40 g trilobar adenoma. The bladder was evaluated in its endoscopic entirety. There is 1+ trabeculation with scattered cellules present. A few shallow diverticuli are present. There were no tumors, stones, ulcers or foreign bodies. Again, there are a few areas on the posterior bladder wall where the mucosa is slightly raised but not erythematous and not changed at all. The ureteral orifices were seen and were normal.  The scope was removed. The patient tolerated the procedure and there were no complications. Estimated blood loss was minimal.  A dose of 500 mg ciprofloxacin was given for the procedure. Impression: 1. History of urothelial carcinoma the bladder in 2017 and 2020 with no recurrences today. 2.  BPH with obstruction/LUTS. His quality of life is reasonable according to him. As such we will continue [place] and tamsulosin. Return in 6 months for surveillance cystoscopy and follow-up.

## 2021-03-12 ENCOUNTER — TELEPHONE (OUTPATIENT)
Dept: PULMONOLOGY | Age: 76
End: 2021-03-12

## 2021-03-12 DIAGNOSIS — G47.30 SLEEP APNEA, UNSPECIFIED TYPE: Primary | ICD-10-CM

## 2021-03-12 DIAGNOSIS — G47.33 OSA ON CPAP: ICD-10-CM

## 2021-03-12 DIAGNOSIS — Z99.89 OSA ON CPAP: ICD-10-CM

## 2021-03-12 NOTE — TELEPHONE ENCOUNTER
----- Message from Dorian Wilkinson MD sent at 3/11/2021  8:30 PM EST -----  Please schedule patient for follow-up with Ms. Nidia Benito PA-C clinic in the next 1 to 2 weeks to go over the abnormal home sleep study reports and further management as above

## 2021-03-12 NOTE — TELEPHONE ENCOUNTER
Tila called from [facility] and since the sleep study was March 4th they will need a new order after that date please.

## 2021-03-13 ASSESSMENT — ENCOUNTER SYMPTOMS
RHINORRHEA: 0
ABDOMINAL DISTENTION: 0
NAUSEA: 0
SORE THROAT: 0
EYE PAIN: 0
SHORTNESS OF BREATH: 0
SINUS PRESSURE: 0
COUGH: 0
CONSTIPATION: 0
DIARRHEA: 0
ABDOMINAL PAIN: 0

## 2021-03-13 NOTE — PROGRESS NOTES
[address] [place] [address]  Dept: [phone]  Dept Fax: [phone]  Loc: [phone]  PROGRESS NOTE      VisitDate: 3/8/2021    Joshua Ruby is a 67 y.o. male who presents today for:     Chief Complaint   Patient presents with    3 Month Follow-Up     hyperlipidemia, BPH.  Medication Refill     wants Rx's printed. Subjective:  HPI  Patient history of bladder cancer comes in follow-up hypertension. Blood pressure is stable and well-controlled. Follow-up hyperlipidemia cholesterol levels have been stable. Labs reviewed with the patient. Review of Systems   Constitutional: Negative for appetite change and fever. HENT: Negative for congestion, ear pain, postnasal drip, rhinorrhea, sinus pressure and sore throat. Eyes: Negative for pain and visual disturbance. Respiratory: Negative for cough and shortness of breath. Cardiovascular: Negative for chest pain. Gastrointestinal: Negative for abdominal distention, abdominal pain, constipation, diarrhea and nausea. Genitourinary: Negative for dysuria, frequency and urgency. Musculoskeletal: Negative for arthralgias. Skin: Negative for rash. Neurological: Negative for dizziness.      Past Medical History:   Diagnosis Date    Arthritis     CAD (coronary artery disease)     Chronic obstructive pulmonary disease ([address] 9/17/2019    GERD (gastroesophageal reflux disease)     Hyperglycemia 3/18/2016    Hyperlipidemia     Hypertension     IBS (irritable bowel syndrome)     Kidney stones     Malignant neoplasm of trigone of urinary bladder (HCC)     Obstructive sleep apnea     wears cpap    Prostatitis       Past Surgical History:   Procedure Laterality Date    ABDOMEN SURGERY      BACK SURGERY  07/08/2013    Lumbar Coyle L2-S-1, revision, PLIF L5-S-1 w/ grafting    CARDIAC CATHETERIZATION  9/2007   Heather Oh  2008    Dr. Aisha Marquez  COLONOSCOPY  ? Dr. Joseph Red      CYSTOSCOPY N/A 2020    CYSTOSCOPY TRANSURETHRAL RESECTION BLADDER TUMOR performed by Randee Lynne MD at [facility], ESOPHAGUS      ENDOSCOPY, COLON, DIAGNOSTIC     [address] [facility]      Dr. Jhoana Spangler Right 3/25/2014    right total hip    OTHER SURGICAL HISTORY  2016    TURBT by Dr Austin Bahc     Family History   Problem Relation Age of Onset    Heart Disease Mother     Diabetes Mother     Kidney Disease Father     Cancer Brother         colon     Social History     Tobacco Use    Smoking status: Former Smoker     Packs/day: 1.00     Years: 25.00     Pack years: 25.00     Quit date: 1995     Years since quittin.2    Smokeless tobacco: Former User   Substance Use Topics    Alcohol use:  Yes     Alcohol/week: 1.0 standard drinks     Types: 1 Cans of beer per week     Comment: occas      Current Outpatient Medications   Medication Sig Dispense Refill    dicyclomine (BENTYL) 20 MG tablet Take 1 tablet by mouth 2 times daily 180 tablet 3    lisinopril (PRINIVIL;ZESTRIL) 10 MG tablet Take 1 tablet by mouth daily 90 tablet 3    omeprazole (PRILOSEC) 20 MG delayed release capsule Take 1 capsule by mouth daily 90 capsule 3    atorvastatin (LIPITOR) 40 MG tablet Take 1 tablet by mouth nightly 90 tablet 3    tamsulosin (FLOMAX) 0.4 MG capsule Take 2 capsules by mouth nightly 180 capsule 3    UNABLE TO FIND Diltiazem 2% gel apply to rectal fissure daily as directed 60 g 1    blood glucose monitor strips [employer] True Metrix Test strips Test BS's every two weeks as directed Dx: R73.9 50 strip 3    Blood Glucose Calibration (CONTROL) Normal SOLN True Metrix Control Solution, Diagnosis:E11.9 1 each 3    metoprolol tartrate (LOPRESSOR) 25 MG tablet Take 1 tablet by mouth daily 90 tablet 3    aspirin 81 MG EC tablet Take 81 mg by mouth daily      Multiple Vitamins-Minerals (PRESERVISION AREDS) TABS Take by mouth      UNABLE TO FIND Apple cider vinegar 625mg daily      blood glucose monitor strips True Metrix Test Strips, test QD Diagnosis:E11.9 100 strip 3    hyoscyamine (NULEV) 125 MCG TBDP dispersible tablet Take 1 tablet by mouth every 4 hours as needed (abd pain) 360 tablet 3    Psyllium (METAMUCIL FIBER PO) Take 2 tablets by mouth daily      NONFORMULARY Take 450 mg by mouth 2 times daily      nitroGLYCERIN (NITROSTAT) 0.4 MG SL tablet Place 1 tablet under the tongue every 5 minutes as needed for Chest pain 25 tablet 5    Cinnamon 500 MG CAPS Take 500 mg by mouth Daily      Milk Thistle 1000 MG CAPS Take 1,000 mg by mouth Daily      Lancets MISC TruePlus Super Thin 28G lancets Test BS's every 2 weeks as directed Dx: R73.9 50 each 11    Ubiquinol 100 MG CAPS Take 1 tablet by mouth daily.  Bilberry 100 MG CAPS Take 100 mg by mouth Daily       fish oil-omega-3 fatty acids 1000 MG capsule Take 1 g by mouth daily.  Chromium Picolinate 500 MCG TABS Take 500 mg by mouth daily.  Vitamin E 400 UNIT TABS Take 400 mg by mouth daily.  Ascorbic Acid (VITAMIN C CR) 500 MG TBCR Take 500 mg by mouth daily.  Methylsulfonylmethane (MSM) 1500 MG TABS Take 1,500 mg by mouth daily.  Lycopene 10 MG CAPS Take 20 mg by mouth daily.  Multiple Vitamin (MULTIVITAMIN PO) Take 1 tablet by mouth daily.  Cholecalciferol (VITAMIN D3) 2000 UNIT TABS Take 2,000 mg by mouth daily.  Pyridoxine HCl (VITAMIN B-6) 50 MG tablet Take 50 mg by mouth 2 times daily.  Vitamins-Lipotropics (BALANCED B-50 COMPLEX PO) Take  by mouth 2 times daily.  trospium (SANCTURA) 20 MG tablet Take 1 tablet by mouth 2 times daily 180 tablet 3     No current facility-administered medications for this visit.       Allergies   Allergen Reactions    Avodart [Dutasteride] Other (See Comments)     Chest tightness    Myrbetriq [Mirabegron] Other (See Comments)     Red spots     Health Maintenance   Topic Date Due    Hepatitis C screen  Never done    DTaP/Tdap/Td vaccine (1 - Tdap) 10/08/1963    Annual Wellness Visit (AWV)  06/09/2021    Lipid screen  12/02/2021    Potassium monitoring  12/02/2021    Creatinine monitoring  12/02/2021    Flu vaccine  Completed    Shingles Vaccine  Completed    Pneumococcal 65+ years Vaccine  Completed    COVID-19 Vaccine  Completed    Hepatitis A vaccine  Aged Out    Hepatitis B vaccine  Aged Out    Hib vaccine  Aged Out    Meningococcal (ACWY) vaccine  Aged Out         Objective:     Physical Exam  Constitutional:       General: He is not in acute distress. Appearance: He is well-developed. He is not diaphoretic. HENT:      Head: Normocephalic and atraumatic. Right Ear: External ear normal.      Left Ear: External ear normal.   Eyes:      Conjunctiva/sclera: Conjunctivae normal.   Neck:      Vascular: No JVD. Cardiovascular:      Rate and Rhythm: Normal rate and regular rhythm. Heart sounds: Normal heart sounds. Pulmonary:      Effort: Pulmonary effort is normal.      Breath sounds: Normal breath sounds. No wheezing or rales. Musculoskeletal:         General: No tenderness. Skin:     General: Skin is warm and dry. Coloration: Skin is not pale. Neurological:      Mental Status: He is alert and oriented to person, place, and time. /70   Pulse 64   Temp 97 °F (36.1 °C) (Infrared)   Resp 20   Wt 242 lb (109.8 kg)   BMI 32.82 kg/m²       Impression/Plan:  1. Essential hypertension, benign    2. Hyperlipidemia, unspecified hyperlipidemia type    3.  History of bladder cancer      Requested Prescriptions     Signed Prescriptions Disp Refills    dicyclomine (BENTYL) 20 MG tablet 180 tablet 3     Sig: Take 1 tablet by mouth 2 times daily    lisinopril (PRINIVIL;ZESTRIL) 10 MG tablet 90 tablet 3     Sig: Take 1 tablet by mouth daily    omeprazole (PRILOSEC) 20 MG delayed release capsule 90 capsule 3     Sig: Take 1 capsule by mouth daily    atorvastatin (LIPITOR) 40 MG tablet 90 tablet 3     Sig: Take 1 tablet by mouth nightly     Orders Placed This Encounter   Procedures    Lipid Panel     Standing Status:   Future     Standing Expiration Date:   3/8/2022     Order Specific Question:   Is Patient Fasting?/# of Hours     Answer:   yes/12    Comprehensive Metabolic Panel     Standing Status:   Future     Standing Expiration Date:   3/8/2022    CBC Auto Differential     Standing Status:   Future     Standing Expiration Date:   3/8/2022       Patient giveneducational materials - see patient instructions. Discussed use, benefit, and side effects of prescribed medications. All patient questions answered. Pt voiced understanding. Reviewed health maintenance. Patient agreedwith treatment plan. Follow up as directed. **This report has been created using voice recognition software. It may contain minor errorswhich are inherent in voice recognition technology. **       Electronically signed by Gabino Ordaz MD on 3/13/2021 at 9:38 AM

## 2021-04-13 ENCOUNTER — HOSPITAL ENCOUNTER (OUTPATIENT)
Dept: MRI IMAGING | Age: 76
Discharge: HOME | End: 2021-04-13
Payer: MEDICARE

## 2021-04-13 DIAGNOSIS — H50.011 MONOCULAR ESOTROPIA OF RIGHT EYE: ICD-10-CM

## 2021-04-13 DIAGNOSIS — H50.21 VERTICAL STRABISMUS OF RIGHT EYE: ICD-10-CM

## 2021-04-13 LAB — POC CREATININE WHOLE BLOOD: 1 MG/DL (ref 0.5–1.2)

## 2021-04-13 PROCEDURE — 70553 MRI BRAIN STEM W/O & W/DYE: CPT

## 2021-04-13 PROCEDURE — A9579 GAD-BASE MR CONTRAST NOS,1ML: HCPCS | Performed by: OPHTHALMOLOGY

## 2021-04-13 PROCEDURE — 6360000004 HC RX CONTRAST MEDICATION: Performed by: OPHTHALMOLOGY

## 2021-04-13 PROCEDURE — 82565 ASSAY OF CREATININE: CPT

## 2021-04-13 RX ADMIN — GADOTERIDOL 20 ML: 279.3 INJECTION, SOLUTION INTRAVENOUS at 20:41

## 2021-08-10 LAB
ALBUMIN SERPL-MCNC: 4.3 G/DL (ref 3.2–5.3)
ALK PHOSPHATASE: 76 U/L (ref 39–130)
ALT SERPL-CCNC: 20 U/L (ref 0–40)
ANION GAP SERPL CALCULATED.3IONS-SCNC: 9 MMOL/L (ref 5–15)
AST SERPL-CCNC: 22 U/L (ref 0–41)
BILIRUB SERPL-MCNC: 1.8 MG/DL (ref 0.3–1.2)
BILIRUBIN DIRECT: 0.3 MG/DL (ref 0–0.4)
BUN BLDV-MCNC: 18 MG/DL (ref 5–27)
CALCIUM SERPL-MCNC: 9 MG/DL (ref 8.5–10.5)
CHLORIDE BLD-SCNC: 107 MMOL/L (ref 98–109)
CHOLESTEROL/HDL RATIO: 2.5 (ref 1–5)
CHOLESTEROL: 128 MG/DL (ref 150–200)
CO2: 25 MMOL/L (ref 22–32)
CREAT SERPL-MCNC: 0.96 MG/DL (ref 0.6–1.3)
EGFR AFRICAN AMERICAN: >60 ML/MIN/1.73SQ.M
EGFR IF NONAFRICAN AMERICAN: >60 ML/MIN/1.73SQ.M
GLUCOSE: 114 MG/DL (ref 65–99)
HCT VFR BLD CALC: 41.7 % (ref 39–49)
HDLC SERPL-MCNC: 52 MG/DL
HEMOGLOBIN: 14.2 G/DL (ref 13–17)
LDL CHOLESTEROL CALCULATED: 60 MG/DL
LDL/HDL RATIO: 1.2
MCH RBC QN AUTO: 30.8 PG (ref 27–34)
MCHC RBC AUTO-ENTMCNC: 34.2 G/DL (ref 32–36)
MCV RBC AUTO: 90 FL (ref 80–100)
PDW BLD-RTO: 13.1 % (ref 11.5–15)
PLATELETS: 187 X10E9/L (ref 150–450)
PMV BLD AUTO: 9.7 FL (ref 7–12)
POTASSIUM SERPL-SCNC: 3.9 MMOL/L (ref 3.5–5)
RBC: 4.62 X10E12/L (ref 4.1–5.7)
SODIUM BLD-SCNC: 141 MMOL/L (ref 134–146)
TOTAL PROTEIN: 7 G/DL (ref 6–8)
TRIGL SERPL-MCNC: 79 MG/DL (ref 27–150)
VLDLC SERPL CALC-MCNC: 16 MG/DL (ref 0–30)
WBC: 7.9 X10E9/L (ref 4–11)

## 2021-09-13 ENCOUNTER — PROCEDURE VISIT (OUTPATIENT)
Dept: UROLOGY | Age: 76
End: 2021-09-13
Payer: MEDICARE

## 2021-09-13 ENCOUNTER — TELEPHONE (OUTPATIENT)
Dept: UROLOGY | Age: 76
End: 2021-09-13

## 2021-09-13 VITALS — WEIGHT: 247.6 LBS | BODY MASS INDEX: 33.54 KG/M2 | RESPIRATION RATE: 12 BRPM | HEIGHT: 72 IN

## 2021-09-13 DIAGNOSIS — C67.8 MALIGNANT NEOPLASM OF OVERLAPPING SITES OF BLADDER (HCC): Primary | ICD-10-CM

## 2021-09-13 DIAGNOSIS — Z01.818 PRE-OP TESTING: ICD-10-CM

## 2021-09-13 DIAGNOSIS — C67.8 MALIGNANT NEOPLASM OF OVERLAPPING SITES OF BLADDER (HCC): ICD-10-CM

## 2021-09-13 DIAGNOSIS — R33.9 INCOMPLETE BLADDER EMPTYING: Primary | ICD-10-CM

## 2021-09-13 LAB
BILIRUBIN URINE: NEGATIVE
BLOOD URINE, POC: NEGATIVE
CHARACTER, URINE: CLEAR
COLOR, URINE: YELLOW
GLUCOSE URINE: NEGATIVE MG/DL
KETONES, URINE: NEGATIVE
LEUKOCYTE CLUMPS, URINE: ABNORMAL
NITRITE, URINE: NEGATIVE
PH, URINE: 6.5 (ref 5–9)
PROTEIN, URINE: NEGATIVE MG/DL
SPECIFIC GRAVITY, URINE: 1.02 (ref 1–1.03)
UROBILINOGEN, URINE: 0.2 EU/DL (ref 0–1)

## 2021-09-13 PROCEDURE — 81003 URINALYSIS AUTO W/O SCOPE: CPT | Performed by: UROLOGY

## 2021-09-13 PROCEDURE — 52000 CYSTOURETHROSCOPY: CPT | Performed by: UROLOGY

## 2021-09-13 NOTE — TELEPHONE ENCOUNTER
DO NOT TAKE ASPIRIN, PLAVIX, FISH OIL, COUMADIN, IBUPROFEN, MOTRIN-LIKE DRUGS AND ANY MULTIVITAMINS OR OVER THE COUNTER SUPPLEMENTS 5 DAYS PRIOR TO SURGERY. Leonides Rahman 1945 Diagnosis:    Surgical Physician: Dr. Kristin Mojica have been scheduled for the procedure marked below:      Surgery Cystoscopy,Bladder Biopsy with fulguration         Date: 10/4/21     Anesthesia: Anesthesiologist (General/Spinal)     Place of Service: [facility] Second Floor Same Day Surgery         Arrive to same day surgery by:  9:00 am       [facility] TO  AT 8:30 AM  (Surgery time is subject to change)      INSTRUCTIONS AS MARKED BELOW:    1.  DO NOT eat or drink anything after midnight before surgery. 2.  We prefer you shower or bathe with an antibacterial soap (Dial) the morning of surgery. 3.  Please ensure to have a  with you to transport you home. 4.  Please bring a current medication list, photo ID and insurance card(s) with you  5. Okay to take Tylenol  6. If you take Glucophage, Metformin or Janumet, hold 48-hours prior to surgery  7. Take blood pressure or heart medication as directed, if taken in the morning take with a small sip of water  8. The office will call you in 1-2 days after your procedure to schedule a follow up. DATE SENSITIVE TESTING    Do the pre op chest xray now.  Order included        Date: 9/13/2021

## 2021-09-13 NOTE — PROGRESS NOTES
MD MD [facility] Consultation / New Patient Visit    Patient:  Paola Garcia  YOB: 1945  Date: 9/13/2021  Consult requested from Tc Mccullough MD     HISTORY OF PRESENT ILLNESS:   The patient is a 67 y.o. male who presents today for follow-up for the following problem(s): history of bladder cancer, BPH with LUTs  Overall the problem(s) : are worsening. Associated Symptoms: No dysuria, gross hematuria. Pain Severity:      Today visit:   9/13/21   Cystoscopy Operative Note  Surgeon: Amanda Winchester MD   Anesthesia: Urethral 2%  Indications: bladder cancer  Position: supine  Findings:   The patient was prepped and draped in the usual sterile fashion. The flexible cystoscope was advanced through the urethra and into the bladder. The bladder was thoroughly inspected and the following was noted:    Residual Urine:  Significant  Urethra: No abnormalities of the urethra are noted. Prostate: Large gland Complete obstruction by lateral & small median lobe of prostate. Bladder: No tumors or CIS noted. No bladder diverticulum. Severe trabeculation noted. Ureters: Clear efflux from both ureters. Orifices with normal configuration and location. The cystoscope was removed. The patient tolerated the procedure well. 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 He is on Flomax and Marlen [place] and Praful with respectable improvement although not completely satisfied. He feels he does not empty his bladder completely. He has nocturia x2.  UA today: WNL  PVR today: 105 mL.     Last several PSA's:  Lab Results   Component Value Date PSA 0.8 05/19/2016    PSA 0.7 10/06/2015    PSA 0.8 12/16/2014       Last total testosterone:  No results found for: TESTOSTERONE    Urinalysis today:  Results for POC orders placed in visit on 09/13/21   POCT Urinalysis No Micro (Auto)   Result Value Ref Range    Glucose, Ur Negative NEGATIVE mg/dl    Bilirubin Urine Negative     Ketones, Urine Negative NEGATIVE    Specific Gravity, Urine 1.020 1.002 - 1.030    Blood, UA POC Negative NEGATIVE    pH, Urine 6.50 5.0 - 9.0    Protein, Urine Negative NEGATIVE mg/dl    Urobilinogen, Urine 0.20 0.0 - 1.0 eu/dl    Nitrite, Urine Negative NEGATIVE    Leukocyte Clumps, Urine Trace (A) NEGATIVE    Color, Urine Yellow YELLOW-STRAW    Character, Urine Clear [id number].CLOUD         Last BUN and creatinine:  Lab Results   Component Value Date    BUN 18 08/10/2021     Lab Results   Component Value Date    CREATININE 0.96 08/10/2021       Imaging Reviewed during this Office Visit:   (results were independently reviewed by physician and radiology report verified)    PAST MEDICAL, FAMILY AND SOCIAL HISTORY:  Past Medical History:   Diagnosis Date    Arthritis     CAD (coronary artery disease)     Chronic obstructive pulmonary disease ([facility] [address] 9/17/2019    GERD (gastroesophageal reflux disease)     Hyperglycemia 3/18/2016    Hyperlipidemia     Hypertension     IBS (irritable bowel syndrome)     Kidney stones     Malignant neoplasm of trigone of urinary bladder (HCC)     Obstructive sleep apnea     wears cpap    Prostatitis      Past Surgical History:   Procedure Laterality Date    ABDOMEN SURGERY      BACK SURGERY  07/08/2013    Lumbar Coyle L2-S-1, revision, PLIF L5-S-1 w/ grafting    CARDIAC CATHETERIZATION  9/2007    COLON SURGERY  2008    Dr. Nikolas Brennan  2011?     Dr. Luisa Muir  2007    CYSTOSCOPY N/A 2/28/2020    CYSTOSCOPY TRANSURETHRAL RESECTION BLADDER TUMOR performed by Oj Valiente MD at [address] [place] [address] DILATATION, ESOPHAGUS      ENDOSCOPY, COLON, DIAGNOSTIC      HERNIA REPAIR  2005    Dr. Dorado Pleasant Right 3/25/2014    right total hip    OTHER SURGICAL HISTORY  08/25/2016    TURBT by Dr Nasra Boyd     Family History   Problem Relation Age of Onset    Heart Disease Mother     Diabetes Mother     Kidney Disease Father     Cancer Brother         colon     Outpatient Medications Marked as Taking for the 9/13/21 encounter (Procedure visit) with Paul Calle MD   Medication Sig Dispense Refill    metoprolol tartrate (LOPRESSOR) 25 MG tablet Take 1 tablet by mouth daily 90 tablet 1    trospium (SANCTURA) 20 MG tablet Take 1 tablet by mouth 2 times daily 180 tablet 3    dicyclomine (BENTYL) 20 MG tablet Take 1 tablet by mouth 2 times daily 180 tablet 3    lisinopril (PRINIVIL;ZESTRIL) 10 MG tablet Take 1 tablet by mouth daily 90 tablet 3    omeprazole (PRILOSEC) 20 MG delayed release capsule Take 1 capsule by mouth daily 90 capsule 3    atorvastatin (LIPITOR) 40 MG tablet Take 1 tablet by mouth nightly 90 tablet 3    tamsulosin (FLOMAX) 0.4 MG capsule Take 2 capsules by mouth nightly 180 capsule 3    UNABLE TO FIND Diltiazem 2% gel apply to rectal fissure daily as directed 60 g 1    blood glucose monitor strips [employer] True Metrix Test strips Test BS's every two weeks as directed Dx: R73.9 50 strip 3    Blood Glucose Calibration (CONTROL) Normal SOLN True Metrix Control Solution, Diagnosis:E11.9 1 each 3    aspirin 81 MG EC tablet Take 81 mg by mouth daily      Multiple Vitamins-Minerals (PRESERVISION AREDS) TABS Take by mouth      UNABLE TO FIND Apple cider vinegar 625mg daily      blood glucose monitor strips True Metrix Test Strips, test QD Diagnosis:E11.9 100 strip 3    hyoscyamine (NULEV) 125 MCG TBDP dispersible tablet Take 1 tablet by mouth every 4 hours as needed (abd pain) 360 tablet 3    Psyllium (METAMUCIL FIBER PO) Take 2 tablets by mouth daily      NONFORMULARY Take 450 mg by mouth 2 times daily      nitroGLYCERIN (NITROSTAT) 0.4 MG SL tablet Place 1 tablet under the tongue every 5 minutes as needed for Chest pain 25 tablet 5    Cinnamon 500 MG CAPS Take 500 mg by mouth Daily      Milk Thistle 1000 MG CAPS Take 1,000 mg by mouth Daily      Lancets MISC TruePlus Super Thin 28G lancets Test BS's every 2 weeks as directed Dx: R73.9 50 each 11    Ubiquinol 100 MG CAPS Take 1 tablet by mouth daily.  Bilberry 100 MG CAPS Take 100 mg by mouth Daily       fish oil-omega-3 fatty acids 1000 MG capsule Take 1 g by mouth daily.  Chromium Picolinate 500 MCG TABS Take 500 mg by mouth daily.  Vitamin E 400 UNIT TABS Take 400 mg by mouth daily.  Ascorbic Acid (VITAMIN C CR) 500 MG TBCR Take 500 mg by mouth daily.  Methylsulfonylmethane (MSM) 1500 MG TABS Take 1,500 mg by mouth daily.  Lycopene 10 MG CAPS Take 20 mg by mouth daily.  Multiple Vitamin (MULTIVITAMIN PO) Take 1 tablet by mouth daily.  Cholecalciferol (VITAMIN D3) 2000 UNIT TABS Take 2,000 mg by mouth daily.  Pyridoxine HCl (VITAMIN B-6) 50 MG tablet Take 50 mg by mouth 2 times daily.  Vitamins-Lipotropics (BALANCED B-50 COMPLEX PO) Take  by mouth 2 times daily.            Avodart [dutasteride] and Myrbetriq [mirabegron]  Social History     Tobacco Use   Smoking Status Former Smoker    Packs/day: 1.00    Years: 25.00    Pack years: 25.00    Quit date: 1995    Years since quittin.7   Smokeless Tobacco Former User       Social History     Substance and Sexual Activity   Alcohol Use Yes    Alcohol/week: 1.0 standard drinks    Types: 1 Cans of beer per week    Comment: occas       REVIEW OF SYSTEMS:  Constitutional: negative  Eyes: negative  Respiratory: negative  Cardiovascular: negative  Gastrointestinal: negative  Musculoskeletal: negative  Genitourinary: negative  Skin: negative   Neurological: negative  Hematological/Lymphatic: negative  Psychological: negative    Physical Exam:    This a 67 y.o. male   Vitals:    09/13/21 1354   Resp: 12     Constitutional: Patient in no acute distress   Neuro: alert and oriented to person place and time. Psych: Mood and affect normal.  Head: atraumatic normocephalic  Eyes: EOMi  HEENT: neck supple, trachea midline  Lungs: Respiratory effort normal  Cardiovascular:  Normal peripheral pulses  Abdomen: Soft, non-tender, non-distended, No CVA  Bladder: non-tender and not distended. FROMx4, no cyanosis clubbing edema  Skin: warm and dry      Assessment and Plan      1. Malignant neoplasm of overlapping sites of bladder [facility])           Plan:      No follow-ups on file.   Cysto bladder biopsy with fulguration  Eventual greenlight PVP

## 2021-09-14 NOTE — TELEPHONE ENCOUNTER
CARDIAC CLEARANCE FOR    Clearance From  Dr Maurilio Russell    Appointment Date    Time       Arielle Arauz  1945  Surgeon:  Dr Vickie Cervantes    Procedure:  Cystoscopy with bladder biopsy with fuguration  Date:  10/4/21  Facility: [facility] HISTORY  DM CAD PVD CVA DVT/PE MI CHFMalignant Hyperthemia HTN Tobacco/ETOH Sleep Apnea GERD Hyperlipidemia Renal Insufficiency COPD/Asthma Bleeding Disorder Pacemaker/AICD  II. CURRENT MEDICATIONS: Attach list or complete     Pt is on following meds that need special instructions for surgery:  Anticoagulants Heart Meds ASA Insulin Oral anti-diabetics NSAIDS Diuretics     K replacements  III. ALLERGIES:   IV.  FUNCTIONAL CAPACITY  >4 METS (CAN VACUUM/HOUSEWORK,CLIMB FLIGHT STAIRS WITHOUT DYSPNEA)  <4METS (FLIGHT OF STEPS CAUSES DYSPNEA/CARDIAC SYMPTOMS)   Stress Test Recommended:    Stress tests or Cardiac Cath in last 5 years:  Yes (attach report)  No   Results: WNL   ABN  Any Change in Cardiac symptoms: Yes  NO  Comments:    Revascularization in last 5 years: Yes  NO  CABG: Yes  No   Comments:     Stents:  Date: ________  Any change in cardiac symptoms  Yes  NO  Comments:   V.  REVIEW OF SYSTEMS:  (Pertinent positive or negative)    VI. PHYSICIAL EXAM  HEENT:1.  Dentations   Good Poor          HT:_______ WT:______      2. Neck Pathology: Rheumatoid DDD C-spine  BP:______ P:________  PULMONARY:  CARDIAC  ABDOMEN  EXTREMITIES  OTHER  VII.   Testing Ordered by Surgeon  Reviewed by Clearance Physician  Test      Result  Plan,if Abnormal  ___CBS     WNL  ABN____________________  ___BMP/BUN/CR    WNL  ABN____________________  ___K+      WNL  ABN____________________  ___UA      WNL  ABN____________________  ___CXR     WNL  ABN____________________  ___EKG     WNL  ABN____________________  ___MRSA     WNL  ABN____________________  VIII.  ___Acceptable risk for surgery ___Risk Unacceptable-Communication to Follow Comments:_____________________________________________________  ________________________________________________________________________  Physician ___________________________  Date:_______________  Physician Printed Name:  _________________________    Milton Belts  [phone]

## 2021-09-14 NOTE — TELEPHONE ENCOUNTER
Patient scheduled for surgery with Dr Peter Vela on 10/4/021. Surgery consent on arrival. Patient to do pre op chest xray now. Dr Linda Diop to clear. Surgery instructions given to the patient.

## 2021-09-14 NOTE — TELEPHONE ENCOUNTER
SURGERY [address]  [address] [place] [address] [address] [facility] [address] [place]E [place], [address]      Phone *[phone]   Surgical Scheduling Direct Line Phone *[phone] Fax *[phone]      Bobby Middleton 1945 male    [address] [place] [address] [address] Munoz   Marital Status:          Home Phone: [phone]      Cell Phone:    Telephone Information:   Mobile [phone]          Surgeon: Dr. Baron Montague Surgery Date: 10/4/21   Time: 11:00 am    Procedure: Cystoscopy,Bladder biopsy with fulguration    Diagnosis: Bladder Trabeculation    Important Medical History: In Epic    Special Inst/Equip:     CPT Codes:    70188  Latex Allergy: no     Cardiac Device:  no    Anesthesia:  General          Admission Type:  Same Day                        Admit Prior to Day of Surgery: no    Case Location:  Main OR            Preadmission Testing:  Phone Call          PAT Date and Time:______________________________________________________    PAT Confirmation #: ______________________________________________________    Post Op Visit: ___________________________________________________________    Need Preop Cardiac Clearance: Yes    Does Patient have Cardiologist/physician?      Dr Alexander Rey Confirmation #: __________________________________________________    Gallito Peabody: ________________________   Date: __________________________     [employer] Name: [facility]

## 2021-09-15 ENCOUNTER — TELEPHONE (OUTPATIENT)
Dept: UROLOGY | Age: 76
End: 2021-09-15

## 2021-09-15 LAB — URINE CULTURE, ROUTINE: NORMAL

## 2021-09-16 ENCOUNTER — HOSPITAL ENCOUNTER (OUTPATIENT)
Dept: GENERAL RADIOLOGY | Age: 76
Discharge: HOME | End: 2021-09-16
Payer: MEDICARE

## 2021-09-16 ENCOUNTER — HOSPITAL ENCOUNTER (OUTPATIENT)
Age: 76
Discharge: HOME | End: 2021-09-16
Payer: MEDICARE

## 2021-09-16 DIAGNOSIS — C67.8 MALIGNANT NEOPLASM OF OVERLAPPING SITES OF BLADDER (HCC): ICD-10-CM

## 2021-09-16 DIAGNOSIS — Z01.818 PRE-OP TESTING: ICD-10-CM

## 2021-09-16 PROCEDURE — 71046 X-RAY EXAM CHEST 2 VIEWS: CPT

## 2021-09-27 NOTE — PROGRESS NOTES
Spoke with patient's daughter Tito Astorga. She verified number listed is correct. She said she would text him on his cell to let him know we are trying to contact him.  I told her we would try to call him tomorrow if he doesn't call us today

## 2021-10-04 ENCOUNTER — ANESTHESIA EVENT (OUTPATIENT)
Dept: OPERATING ROOM | Age: 76
End: 2021-10-04
Payer: MEDICARE

## 2021-10-04 ENCOUNTER — HOSPITAL ENCOUNTER (OUTPATIENT)
Age: 76
Setting detail: OUTPATIENT SURGERY
Discharge: HOME | End: 2021-10-04
Attending: UROLOGY | Admitting: UROLOGY
Payer: MEDICARE

## 2021-10-04 ENCOUNTER — ANESTHESIA (OUTPATIENT)
Dept: OPERATING ROOM | Age: 76
End: 2021-10-04
Payer: MEDICARE

## 2021-10-04 VITALS
WEIGHT: 245 LBS | HEIGHT: 72 IN | OXYGEN SATURATION: 97 % | TEMPERATURE: 96.1 F | SYSTOLIC BLOOD PRESSURE: 140 MMHG | DIASTOLIC BLOOD PRESSURE: 79 MMHG | HEART RATE: 69 BPM | RESPIRATION RATE: 20 BRPM | BODY MASS INDEX: 33.18 KG/M2

## 2021-10-04 VITALS — SYSTOLIC BLOOD PRESSURE: 98 MMHG | DIASTOLIC BLOOD PRESSURE: 61 MMHG | OXYGEN SATURATION: 100 %

## 2021-10-04 PROCEDURE — 3600000003 HC SURGERY LEVEL 3 BASE: Performed by: UROLOGY

## 2021-10-04 PROCEDURE — 7100000000 HC PACU RECOVERY - FIRST 15 MIN: Performed by: UROLOGY

## 2021-10-04 PROCEDURE — 6360000002 HC RX W HCPCS

## 2021-10-04 PROCEDURE — 3700000000 HC ANESTHESIA ATTENDED CARE: Performed by: UROLOGY

## 2021-10-04 PROCEDURE — 2709999900 HC NON-CHARGEABLE SUPPLY: Performed by: UROLOGY

## 2021-10-04 PROCEDURE — 88305 TISSUE EXAM BY PATHOLOGIST: CPT

## 2021-10-04 PROCEDURE — 7100000001 HC PACU RECOVERY - ADDTL 15 MIN: Performed by: UROLOGY

## 2021-10-04 PROCEDURE — 2580000003 HC RX 258

## 2021-10-04 PROCEDURE — 7100000011 HC PHASE II RECOVERY - ADDTL 15 MIN: Performed by: UROLOGY

## 2021-10-04 PROCEDURE — 7100000010 HC PHASE II RECOVERY - FIRST 15 MIN: Performed by: UROLOGY

## 2021-10-04 PROCEDURE — 3600000013 HC SURGERY LEVEL 3 ADDTL 15MIN: Performed by: UROLOGY

## 2021-10-04 PROCEDURE — 2500000003 HC RX 250 WO HCPCS: Performed by: NURSE ANESTHETIST, CERTIFIED REGISTERED

## 2021-10-04 PROCEDURE — 6360000002 HC RX W HCPCS: Performed by: NURSE ANESTHETIST, CERTIFIED REGISTERED

## 2021-10-04 PROCEDURE — 3700000001 HC ADD 15 MINUTES (ANESTHESIA): Performed by: UROLOGY

## 2021-10-04 RX ORDER — FENTANYL CITRATE 50 UG/ML
50 INJECTION, SOLUTION INTRAMUSCULAR; INTRAVENOUS EVERY 5 MIN PRN
Status: DISCONTINUED | OUTPATIENT
Start: 2021-10-04 | End: 2021-10-04 | Stop reason: HOSPADM

## 2021-10-04 RX ORDER — GLYCOPYRROLATE 1 MG/5 ML
SYRINGE (ML) INTRAVENOUS PRN
Status: DISCONTINUED | OUTPATIENT
Start: 2021-10-04 | End: 2021-10-04 | Stop reason: SDUPTHER

## 2021-10-04 RX ORDER — SULFAMETHOXAZOLE AND TRIMETHOPRIM 800; 160 MG/1; MG/1
1 TABLET ORAL 2 TIMES DAILY
Qty: 6 TABLET | Refills: 0 | Status: SHIPPED | OUTPATIENT
Start: 2021-10-04 | End: 2021-10-07

## 2021-10-04 RX ORDER — PHENAZOPYRIDINE HYDROCHLORIDE 100 MG/1
100 TABLET, FILM COATED ORAL 3 TIMES DAILY PRN
Qty: 21 TABLET | Refills: 0 | Status: SHIPPED | OUTPATIENT
Start: 2021-10-04 | End: 2021-10-11

## 2021-10-04 RX ORDER — FENTANYL CITRATE 50 UG/ML
25 INJECTION, SOLUTION INTRAMUSCULAR; INTRAVENOUS EVERY 5 MIN PRN
Status: DISCONTINUED | OUTPATIENT
Start: 2021-10-04 | End: 2021-10-04 | Stop reason: HOSPADM

## 2021-10-04 RX ORDER — MEPERIDINE HYDROCHLORIDE 25 MG/ML
12.5 INJECTION INTRAMUSCULAR; INTRAVENOUS; SUBCUTANEOUS EVERY 5 MIN PRN
Status: DISCONTINUED | OUTPATIENT
Start: 2021-10-04 | End: 2021-10-04 | Stop reason: HOSPADM

## 2021-10-04 RX ORDER — SODIUM CHLORIDE 9 MG/ML
INJECTION, SOLUTION INTRAVENOUS CONTINUOUS
Status: DISCONTINUED | OUTPATIENT
Start: 2021-10-04 | End: 2021-10-04 | Stop reason: HOSPADM

## 2021-10-04 RX ORDER — PROMETHAZINE HYDROCHLORIDE 25 MG/ML
6.25 INJECTION, SOLUTION INTRAMUSCULAR; INTRAVENOUS
Status: DISCONTINUED | OUTPATIENT
Start: 2021-10-04 | End: 2021-10-04 | Stop reason: HOSPADM

## 2021-10-04 RX ORDER — FENTANYL CITRATE 50 UG/ML
INJECTION, SOLUTION INTRAMUSCULAR; INTRAVENOUS PRN
Status: DISCONTINUED | OUTPATIENT
Start: 2021-10-04 | End: 2021-10-04 | Stop reason: SDUPTHER

## 2021-10-04 RX ORDER — LABETALOL 20 MG/4 ML (5 MG/ML) INTRAVENOUS SYRINGE
5 EVERY 10 MIN PRN
Status: DISCONTINUED | OUTPATIENT
Start: 2021-10-04 | End: 2021-10-04 | Stop reason: HOSPADM

## 2021-10-04 RX ORDER — LIDOCAINE HCL/PF 100 MG/5ML
SYRINGE (ML) INJECTION PRN
Status: DISCONTINUED | OUTPATIENT
Start: 2021-10-04 | End: 2021-10-04 | Stop reason: SDUPTHER

## 2021-10-04 RX ORDER — PROPOFOL 10 MG/ML
INJECTION, EMULSION INTRAVENOUS PRN
Status: DISCONTINUED | OUTPATIENT
Start: 2021-10-04 | End: 2021-10-04 | Stop reason: SDUPTHER

## 2021-10-04 RX ORDER — CEFAZOLIN SODIUM 2 G/100ML
2000 INJECTION, SOLUTION INTRAVENOUS
Status: COMPLETED | OUTPATIENT
Start: 2021-10-04 | End: 2021-10-04

## 2021-10-04 RX ORDER — ONDANSETRON 2 MG/ML
4 INJECTION INTRAMUSCULAR; INTRAVENOUS
Status: DISCONTINUED | OUTPATIENT
Start: 2021-10-04 | End: 2021-10-04 | Stop reason: HOSPADM

## 2021-10-04 RX ADMIN — Medication 60 MG: at 11:32

## 2021-10-04 RX ADMIN — FENTANYL CITRATE 50 MCG: 50 INJECTION, SOLUTION INTRAMUSCULAR; INTRAVENOUS at 11:32

## 2021-10-04 RX ADMIN — SODIUM CHLORIDE: 9 INJECTION, SOLUTION INTRAVENOUS at 09:54

## 2021-10-04 RX ADMIN — CEFAZOLIN SODIUM 2000 MG: 2 INJECTION, SOLUTION INTRAVENOUS at 11:37

## 2021-10-04 RX ADMIN — PROPOFOL 100 MG: 10 INJECTION, EMULSION INTRAVENOUS at 11:32

## 2021-10-04 RX ADMIN — Medication 0.2 MG: at 11:50

## 2021-10-04 RX ADMIN — FENTANYL CITRATE 50 MCG: 50 INJECTION, SOLUTION INTRAMUSCULAR; INTRAVENOUS at 11:41

## 2021-10-04 RX ADMIN — PHENYLEPHRINE HYDROCHLORIDE 100 MCG: 10 INJECTION INTRAVENOUS at 11:47

## 2021-10-04 ASSESSMENT — PULMONARY FUNCTION TESTS
PIF_VALUE: 12
PIF_VALUE: 11
PIF_VALUE: 4
PIF_VALUE: 11
PIF_VALUE: 11
PIF_VALUE: 9
PIF_VALUE: 11
PIF_VALUE: 2
PIF_VALUE: 9
PIF_VALUE: 11
PIF_VALUE: 9
PIF_VALUE: 11
PIF_VALUE: 10
PIF_VALUE: 6
PIF_VALUE: 3
PIF_VALUE: 0
PIF_VALUE: 10
PIF_VALUE: 11
PIF_VALUE: 8
PIF_VALUE: 9
PIF_VALUE: 11
PIF_VALUE: 0
PIF_VALUE: 10
PIF_VALUE: 11
PIF_VALUE: 11
PIF_VALUE: 0
PIF_VALUE: 2
PIF_VALUE: 9
PIF_VALUE: 2
PIF_VALUE: 10
PIF_VALUE: 9
PIF_VALUE: 0
PIF_VALUE: 11

## 2021-10-04 ASSESSMENT — PAIN - FUNCTIONAL ASSESSMENT: PAIN_FUNCTIONAL_ASSESSMENT: 0-10

## 2021-10-04 ASSESSMENT — PAIN SCALES - GENERAL
PAINLEVEL_OUTOF10: 0

## 2021-10-04 NOTE — ANESTHESIA PRE PROCEDURE
Department of Anesthesiology  Preprocedure Note       Name:  Augustina Dave   Age:  67 y.o.  :  1945                                          MRN:  [id number]         Date:  10/4/2021      Surgeon: Arlene Barksdale):  Flavio Mike MD    Procedure: CYSTOSCOPY, BLADDER BIOPSY WITH FULGURATION (N/A )    Medications prior to admission:   Prior to Admission medications    Medication Sig Start Date End Date Taking?  Authorizing Provider   Calcium Polycarbophil (FIBER-CAPS PO) Take 2 tablets by mouth daily    Historical Provider, MD   NONFORMULARY as needed Diltiazem 2% gel apply to rectal fissure daily as directed    Historical Provider, MD   metoprolol tartrate (LOPRESSOR) 25 MG tablet Take 1 tablet by mouth daily 21   Donnie Cortes MD   [facility]) 20 MG tablet Take 1 tablet by mouth 2 times daily 3/11/21   Ramy Burgess MD   dicyclomine (BENTYL) 20 MG tablet Take 1 tablet by mouth 2 times daily 3/8/21   Donnie Cortes MD   lisinopril (PRINIVIL;ZESTRIL) 10 MG tablet Take 1 tablet by mouth daily 3/8/21   Donnie Cortes MD   atorvastatin (LIPITOR) 40 MG tablet Take 1 tablet by mouth nightly 3/8/21   Donnie Cortes MD   tamsulosin [employer]) 0.4 MG capsule Take 2 capsules by mouth nightly 20  Donnie Cortes MD   aspirin 81 MG EC tablet Take 81 mg by mouth daily    Historical Provider, MD   Multiple Vitamins-Minerals (PRESERVISION AREDS) TABS Take by mouth 2 times daily     Historical Provider, MD   UNABLE TO FIND Apple cider vinegar 625mg daily    Historical Provider, MD   hyoscyamine (NULEV) 125 MCG TBDP dispersible tablet Take 1 tablet by mouth every 4 hours as needed (abd pain) 20   Donnie Cortes MD   NONFORMULARY Take 450 mg by mouth 2 times daily    Historical Provider, MD   nitroGLYCERIN (NITROSTAT) 0.4 MG SL tablet Place 1 tablet under the tongue every 5 minutes as needed for Chest pain 17   Donnie Cortes MD   Cinnamon 500 MG CAPS Take 500 mg by mouth Daily Historical Provider, MD   Milk Thistle 1000 MG CAPS Take 1,000 mg by mouth Daily    Historical Provider, MD   Ubiquinol 100 MG CAPS Take 1 tablet by mouth daily. Historical Provider, MD   Bilberry 100 MG CAPS Take 100 mg by mouth Daily     Historical Provider, MD   fish oil-omega-3 fatty acids 1000 MG capsule Take 1 g by mouth daily. Historical Provider, MD   Chromium Picolinate 500 MCG TABS Take 500 mg by mouth daily. Historical Provider, MD   Vitamin E 400 UNIT TABS Take 400 mg by mouth daily. Historical Provider, MD   Ascorbic Acid (VITAMIN C CR) 500 MG TBCR Take 500 mg by mouth daily. Historical Provider, MD   Methylsulfonylmethane (MSM) 1500 MG TABS Take 1,500 mg by mouth daily. Historical Provider, MD   Lycopene 10 MG CAPS Take by mouth daily 2 cap    Historical Provider, MD   Multiple Vitamin (MULTIVITAMIN PO) Take 1 tablet by mouth daily. Historical Provider, MD   Cholecalciferol (VITAMIN D3) 2000 UNIT TABS Take 2,000 mg by mouth daily. Historical Provider, MD   Pyridoxine HCl (VITAMIN B-6) 50 MG tablet Take 50 mg by mouth 2 times daily. Historical Provider, MD   Vitamins-Lipotropics (BALANCED B-50 COMPLEX PO) Take  by mouth 2 times daily. Historical Provider, MD       Current medications:    No current facility-administered medications for this visit. No current outpatient medications on file. Facility-Administered Medications Ordered in Other Visits   Medication Dose Route Frequency Provider Last Rate Last Admin    0.9 % sodium chloride infusion   IntraVENous Continuous Hilda Loco CMA (AAMA)        ceFAZolin (ANCEF) 2000 mg in dextrose 4 % 100 mL IVPB (premix)  2,000 mg IntraVENous 30 Min Pre-Op Hilda Loco CMA (AAMA)           Allergies:     Allergies   Allergen Reactions    Avodart [Dutasteride] Other (See Comments)     Chest tightness    Myrbetriq [Mirabegron] Other (See Comments)     Red spots       Problem List:    Patient Active Problem List   Diagnosis Code    Hyperlipidemia E78.5    Essential hypertension, benign I10    Obesity (BMI 30.0-34. 9) E66.9    Spinal stenosis of lumbar region with neurogenic claudication M48.062    Abnormal nuclear cardiac imaging test R93.1    Obstructive sleep apnea on CPAP G47.33, Z99.89    Primary localized osteoarthrosis, pelvic region and thigh M16.10    Anal fissure K60.2    Hyperglycemia R73.9    Chest pain at rest R07.9    Benign prostatic hyperplasia with incomplete bladder emptying N40.1, R39.14    Bladder tumor D49.4    Malignant neoplasm of trigone of urinary bladder (HCC) C67.0    Malignant neoplasm of posterior wall of urinary bladder (HCC) C67.4    BPH with obstruction/lower urinary tract symptoms N40.1, N13.8    Anxiety F41.9    Contusion of right foot S90.31XA    History of bladder cancer Z85.51       Past Medical History:        Diagnosis Date    Arthritis     CAD (coronary artery disease)     Chronic obstructive pulmonary disease ([facility] [address] 9/17/2019    GERD (gastroesophageal reflux disease)     Hyperglycemia 3/18/2016    Hyperlipidemia     Hypertension     IBS (irritable bowel syndrome)     Kidney stones     Malignant neoplasm of trigone of urinary bladder (HCC)     Obstructive sleep apnea     wears cpap    Prostatitis        Past Surgical History:        Procedure Laterality Date    ABDOMEN SURGERY      BACK SURGERY  07/08/2013    Lumbar Coyle L2-S-1, revision, PLIF L5-S-1 w/ grafting    CARDIAC CATHETERIZATION  9/2007    COLON SURGERY  2008    Dr. Roseanna Waite  2011?     Dr. Melinda Feliciano  2007    CYSTOSCOPY N/A 2/28/2020    CYSTOSCOPY TRANSURETHRAL RESECTION BLADDER TUMOR performed by Junior Latoya MD at [address], ESOPHAGUS      ENDOSCOPY, COLON, DIAGNOSTIC      HERNIA REPAIR  2005    Dr. Mis Sultana Right 3/25/2014    right total hip    OTHER SURGICAL HISTORY 2016    TURBT by Dr Kizzy Adams       Social History:    Social History     Tobacco Use    Smoking status: Former Smoker     Packs/day: 1.00     Years: 25.00     Pack years: 25.00     Quit date: 1995     Years since quittin.7    Smokeless tobacco: Former User   Substance Use Topics    Alcohol use: Yes     Alcohol/week: 1.0 standard drinks     Types: 1 Cans of beer per week     Comment: seldom                                Counseling given: Not Answered      Vital Signs (Current): There were no vitals filed for this visit. BP Readings from Last 3 Encounters:   10/04/21 135/74   21 104/70   21 132/84       NPO Status:                                                                                 BMI:   Wt Readings from Last 3 Encounters:   10/04/21 245 lb (111.1 kg)   21 247 lb 9.6 oz (112.3 kg)   21 242 lb 12.8 oz (110.1 kg)     There is no height or weight on file to calculate BMI.    CBC:   Lab Results   Component Value Date    WBC 7.9 08/10/2021    WBC 7.0 2018    RBC 4.62 08/10/2021    HGB 14.2 08/10/2021    HCT 41.7 08/10/2021    MCV 90 08/10/2021    RDW 13.1 08/10/2021     08/10/2021     2018       CMP:   Lab Results   Component Value Date     08/10/2021    K 3.9 08/10/2021     08/10/2021    CO2 25 08/10/2021    BUN 18 08/10/2021    CREATININE 0.96 08/10/2021    LABGLOM 83 2018    GLUCOSE 114 08/10/2021    PROT 7.0 08/10/2021    CALCIUM 9.0 08/10/2021    BILITOT 1.8 08/10/2021    BILITOT Negative 2020    ALKPHOS 76 08/10/2021    ALKPHOS 69 2018    AST 22 08/10/2021    ALT 20 08/10/2021       POC Tests: No results for input(s): POCGLU, POCNA, POCK, POCCL, POCBUN, POCHEMO, POCHCT in the last 72 hours.     Coags:   Lab Results   Component Value Date    INR 1.01 2014       HCG (If Applicable): No results found for: PREGTESTUR, PREGSERUM, HCG, HCGQUANT     ABGs: No results found for: PHART, PO2ART, LQO6CBF, PZF2ZKJ, BEART, Y0GZBXGX     Type & Screen (If Applicable):  Lab Results   Component Value Date    [facility] POS 03/25/2014       Anesthesia Evaluation   no history of anesthetic complications:   Airway: Mallampati: II  TM distance: >3 FB   Neck ROM: full  Mouth opening: > = 3 FB Dental:          Pulmonary:normal exam    (+) COPD:  sleep apnea: on CPAP,            Patient did not smoke on day of surgery. Cardiovascular:    (+) hypertension:, CAD:,                   Neuro/Psych:   Negative Neuro/Psych ROS              GI/Hepatic/Renal:   (+) GERD:,           Endo/Other: Negative Endo/Other ROS             Pt had no PAT visit       Abdominal:             Vascular: negative vascular ROS. Other Findings:               Anesthesia Plan      general     ASA 3       Induction: intravenous. MIPS: Postoperative opioids intended and Prophylactic antiemetics administered. Anesthetic plan and risks discussed with patient. Plan discussed with CRNA.                   Rosamaria Pierson MD   10/4/2021

## 2021-10-04 NOTE — PROGRESS NOTES
1211-pt received to pacu, drowsy, responds to name. Falls back to sleep quickly. 1220- pt wakes spontaneously, denies pain, nausea. Reports feels good. Resting in bed with eyes slightly open. 1226- pt wakes, verbalizes feeling ok. Denies pain,     1230- pt request urinal, reports feeling urge to urinate. Unable to urinate at this time. 1241- pt meets criteria for discharge from pacu, pt spouse present in [address] [place] [address] upon pt return.  Report given to [facility]

## 2021-10-04 NOTE — ANESTHESIA POSTPROCEDURE EVALUATION
Department of Anesthesiology  Postprocedure Note    Patient: Master Osborne  MRN: [id number]  YOB: 1945  Date of evaluation: 10/4/2021  Time:  1:34 PM     Procedure Summary     Date: 10/04/21 [facility]    Anesthesia Start: 1127 Anesthesia Stop: 2991    Procedure: CYSTOSCOPY, BLADDER BIOPSY WITH FULGURATION (N/A ) Diagnosis: (bladder trabeculation)    Surgeons: Alex Campos MD Responsible Provider: Tedi Pallas, MD    Anesthesia Type: general ASA Status: 3          Anesthesia Type: general    Jayleen Phase I: Jayleen Score: 10    Jayleen Phase II:      Last vitals: Reviewed and per EMR flowsheets.        Anesthesia Post Evaluation    Patient location during evaluation: PACU  Patient participation: complete - patient participated  Level of consciousness: awake and alert  Airway patency: patent  Nausea & Vomiting: no nausea  Complications: no  Cardiovascular status: blood pressure returned to baseline and hemodynamically stable  Respiratory status: acceptable and spontaneous ventilation  Hydration status: euvolemic

## 2021-10-05 NOTE — OP NOTE
[id number] Norberto BUTLER [address] [place]  1945  [id number]    DATE: 10/4/21  SURGEON:  Dr. Vidya Stringer MD , MD  ASSISTANT: Dr. Vidya Stringer MD MD  PREOPERATIVE DIAGNOSIS:  Gross hematuria, bladder tumor    POSTOPERATIVE DIAGNOSIS:  Gross hematuria, bladder tumor  PROCEDURES PERFORMED:  1. Cystourethroscopy. 2. Bladder biopsy with fulguration  ANESTHESIA:  Gen ET  COMPLICATIONS:  None. DRAINS:   None.   SPECIMEN:  Bladder biopsy  ESTIMATED BLOOD LOSS:  Less than 5 mL.      INDICATIONS FOR THE PROCEDURE:  Muna Naylor is a 67 y.o. male presents with a history of gross hematuria and bladder tumor. A cystoscopy was preformed which showed possible recurrence. The patient follows up today to obtain tissue diagnosis. The risks and benefits of the procedure, as well as possible alternatives and complications were discussed and he consented.          DETAILS OF THE PROCEDURE:  The patient was correctly identified in the preoperative holding area. he  was brought back to the operating room and placed in the dorsal lithotomy  position. Anesthesia was administered; antibiotics administered by Anesthesia. EPC cuffs  were on and functional. Patient was then prepped and draped in the usual sterile fashion. Once an appropriate time out had been performed, with all parties  consenting, a 25 Upper sorbian cystoscope with a 30-degree lens was placed through  the urethra into the bladder. A thorough and complete cystoscopy was performed. Abnormalities include: papillary lesions around the previous resection site. The 3x lesion was biopsied with a cold cup biopsy forcep, and then the area was fulgurated with a bugbee electrode. Hemostasis was achieved. The specimen was sent to pathology.   The bladder was drained and the cystoscope was removed.        DISPOSITION:  The patient was discharged home in stable condition with instructions to follow up in clinic for pathology results    Follow up 1-2 weeks for pathology

## 2021-10-11 ENCOUNTER — OFFICE VISIT (OUTPATIENT)
Dept: UROLOGY | Age: 76
End: 2021-10-11
Payer: MEDICARE

## 2021-10-11 VITALS
SYSTOLIC BLOOD PRESSURE: 97 MMHG | WEIGHT: 245.8 LBS | DIASTOLIC BLOOD PRESSURE: 64 MMHG | HEART RATE: 74 BPM | HEIGHT: 72 IN | BODY MASS INDEX: 33.29 KG/M2

## 2021-10-11 DIAGNOSIS — R33.9 INCOMPLETE BLADDER EMPTYING: ICD-10-CM

## 2021-10-11 DIAGNOSIS — C67.8 MALIGNANT NEOPLASM OF OVERLAPPING SITES OF BLADDER (HCC): Primary | ICD-10-CM

## 2021-10-11 DIAGNOSIS — R39.9 LOWER URINARY TRACT SYMPTOMS: ICD-10-CM

## 2021-10-11 PROCEDURE — G8427 DOCREV CUR MEDS BY ELIG CLIN: HCPCS | Performed by: UROLOGY

## 2021-10-11 PROCEDURE — 99214 OFFICE O/P EST MOD 30 MIN: CPT | Performed by: UROLOGY

## 2021-10-11 PROCEDURE — G8417 CALC BMI ABV UP PARAM F/U: HCPCS | Performed by: UROLOGY

## 2021-10-11 PROCEDURE — G8484 FLU IMMUNIZE NO ADMIN: HCPCS | Performed by: UROLOGY

## 2021-10-11 PROCEDURE — 4040F PNEUMOC VAC/ADMIN/RCVD: CPT | Performed by: UROLOGY

## 2021-10-11 PROCEDURE — 1036F TOBACCO NON-USER: CPT | Performed by: UROLOGY

## 2021-10-11 PROCEDURE — 1123F ACP DISCUSS/DSCN MKR DOCD: CPT | Performed by: UROLOGY

## 2021-10-11 NOTE — PROGRESS NOTES
MD MD [address] [facility] Consultation / New Patient Visit    Patient:  Andreina Barajas  YOB: 1945  Date: 10/11/2021  Consult requested from Tea Todd MD     HISTORY OF PRESENT ILLNESS:   The patient is a 67 y.o. male who presents today for follow-up for the following problem(s): history of bladder cancer, BPH with LUTs  Overall the problem(s) : are worsening. Associated Symptoms: No dysuria, gross hematuria. Pain Severity:      Today visit:   10/11/21   Flomax (0.8 mg) & [place] (20 mg BID)  Sp Cysto bladder biopsy  Bladder, biopsy:             Benign urothelial mucosa with von Brunn's nests.             Negative for malignancy. Cystoscopy Operative Note  Surgeon: Sahil Burrell MD   Anesthesia: Urethral 2%  Indications: bladder cancer  Position: supine  Findings:   The patient was prepped and draped in the usual sterile fashion. The flexible cystoscope was advanced through the urethra and into the bladder. The bladder was thoroughly inspected and the following was noted:    Residual Urine:  Significant  Urethra: No abnormalities of the urethra are noted. Prostate: Large gland Complete obstruction by lateral & small median lobe of prostate. Bladder: No tumors or CIS noted. No bladder diverticulum. Severe trabeculation noted. Ureters: Clear efflux from both ureters. Orifices with normal configuration and location. The cystoscope was removed. The patient tolerated the procedure well. 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   He is on Flomax and Marlen [place] and [place] with respectable improvement although not completely satisfied. He feels he does not empty his bladder completely. He has nocturia x2.  UA today: WNL  PVR today: 105 mL. Last several PSA's:  Lab Results   Component Value Date    PSA 0.8 05/19/2016    PSA 0.7 10/06/2015    PSA 0.8 12/16/2014       Last total testosterone:  No results found for: TESTOSTERONE    Urinalysis today:  No results found for this visit on 10/11/21. Last BUN and creatinine:  Lab Results   Component Value Date    BUN 18 08/10/2021     Lab Results   Component Value Date    CREATININE 0.96 08/10/2021       Imaging Reviewed during this Office Visit:   (results were independently reviewed by physician and radiology report verified)    PAST MEDICAL, FAMILY AND SOCIAL HISTORY:  Past Medical History:   Diagnosis Date    Arthritis     CAD (coronary artery disease)     Chronic obstructive pulmonary disease ([facility] [address] 9/17/2019    GERD (gastroesophageal reflux disease)     Hyperglycemia 3/18/2016    Hyperlipidemia     Hypertension     IBS (irritable bowel syndrome)     Kidney stones     Malignant neoplasm of trigone of urinary bladder (HCC)     Obstructive sleep apnea     wears cpap    Prostatitis      Past Surgical History:   Procedure Laterality Date    ABDOMEN SURGERY      BACK SURGERY  07/08/2013    Lumbar Coyle L2-S-1, revision, PLIF L5-S-1 w/ grafting    CARDIAC CATHETERIZATION  9/2007    COLON SURGERY  2008    Dr. Sandra Pittman  2011?     Dr. Ana Condon  2007    CYSTOSCOPY N/A 2/28/2020    CYSTOSCOPY TRANSURETHRAL RESECTION BLADDER TUMOR performed by Teresa Kang MD at [facility] 10/4/2021    CYSTOSCOPY, BLADDER BIOPSY WITH FULGURATION performed by Joya Cardona MD at [address], ESOPHAGUS      ENDOSCOPY, COLON, DIAGNOSTIC      HERNIA REPAIR  2005    Dr. Jordi Dasilva Right 3/25/2014    right total hip    OTHER SURGICAL HISTORY  08/25/2016    TURBT by Dr Carissa Nava     Family History   Problem Relation Age of Onset    Heart Disease Mother     Diabetes Mother     Kidney Disease Father     Cancer Brother         colon     Outpatient Medications Marked as Taking for the 10/11/21 encounter (Office Visit) with Junior Yan MD   Medication Sig Dispense Refill    phenazopyridine (PYRIDIUM) 100 MG tablet Take 1 tablet by mouth 3 times daily as needed for Pain 21 tablet 0    Calcium Polycarbophil (FIBER-CAPS PO) Take 2 tablets by mouth daily      NONFORMULARY as needed Diltiazem 2% gel apply to rectal fissure daily as directed      metoprolol tartrate (LOPRESSOR) 25 MG tablet Take 1 tablet by mouth daily 90 tablet 1    trospium (SANCTURA) 20 MG tablet Take 1 tablet by mouth 2 times daily 180 tablet 3    dicyclomine (BENTYL) 20 MG tablet Take 1 tablet by mouth 2 times daily 180 tablet 3    lisinopril (PRINIVIL;ZESTRIL) 10 MG tablet Take 1 tablet by mouth daily 90 tablet 3    atorvastatin (LIPITOR) 40 MG tablet Take 1 tablet by mouth nightly 90 tablet 3    tamsulosin (FLOMAX) 0.4 MG capsule Take 2 capsules by mouth nightly 180 capsule 3    aspirin 81 MG EC tablet Take 81 mg by mouth daily      Multiple Vitamins-Minerals (PRESERVISION AREDS) TABS Take by mouth 2 times daily       UNABLE TO FIND Apple cider vinegar 625mg daily      hyoscyamine (NULEV) 125 MCG TBDP dispersible tablet Take 1 tablet by mouth every 4 hours as needed (abd pain) 360 tablet 3    NONFORMULARY Take 450 mg by mouth 2 times daily      nitroGLYCERIN (NITROSTAT) 0.4 MG SL tablet Place 1 tablet under the tongue every 5 minutes as needed for Chest pain 25 tablet 5    Cinnamon 500 MG CAPS Take 500 mg by mouth Daily      Milk Thistle 1000 MG CAPS Take 1,000 mg by mouth Daily      Ubiquinol 100 MG CAPS Take 1 tablet by mouth daily.  Bilberry 100 MG CAPS Take 100 mg by mouth Daily       fish oil-omega-3 fatty acids 1000 MG capsule Take 1 g by mouth daily.         Chromium Picolinate 500 MCG TABS Take 500 mg by mouth daily.  Vitamin E 400 UNIT TABS Take 400 mg by mouth daily.  Ascorbic Acid (VITAMIN C CR) 500 MG TBCR Take 500 mg by mouth daily.  Methylsulfonylmethane (MSM) 1500 MG TABS Take 1,500 mg by mouth daily.  Lycopene 10 MG CAPS Take by mouth daily 2 cap      Multiple Vitamin (MULTIVITAMIN PO) Take 1 tablet by mouth daily.  Cholecalciferol (VITAMIN D3) 2000 UNIT TABS Take 2,000 mg by mouth daily.  Pyridoxine HCl (VITAMIN B-6) 50 MG tablet Take 50 mg by mouth 2 times daily.  Vitamins-Lipotropics (BALANCED B-50 COMPLEX PO) Take  by mouth 2 times daily. Avodart [dutasteride] and Myrbetriq [mirabegron]  Social History     Tobacco Use   Smoking Status Former Smoker    Packs/day: 1.00    Years: 25.00    Pack years: 25.00    Quit date: 1995    Years since quittin.7   Smokeless Tobacco Former User       Social History     Substance and Sexual Activity   Alcohol Use Yes    Alcohol/week: 1.0 standard drinks    Types: 1 Cans of beer per week    Comment: seldom       REVIEW OF SYSTEMS:  Constitutional: negative  Eyes: negative  Respiratory: negative  Cardiovascular: negative  Gastrointestinal: negative  Musculoskeletal: negative  Genitourinary: negative  Skin: negative   Neurological: negative  Hematological/Lymphatic: negative  Psychological: negative    Physical Exam:    This a 67 y.o. male   Vitals:    10/11/21 1426   BP: 97/64   Pulse: 74     Constitutional: Patient in no acute distress   Neuro: alert and oriented to person place and time. Psych: Mood and affect normal.  Head: atraumatic normocephalic  Eyes: EOMi  HEENT: neck supple, trachea midline  Lungs: Respiratory effort normal  Cardiovascular:  Normal peripheral pulses  Abdomen: Soft, non-tender, non-distended, No CVA  Bladder: non-tender and not distended. FROMx4, no cyanosis clubbing edema  Skin: warm and dry      Assessment and Plan      1.  Malignant neoplasm of overlapping sites of bladder ([facility] [address]    2. Incomplete bladder emptying    3. Lower urinary tract symptoms           Plan:      No follow-ups on file.   Cysto bladder biopsy with fulguration  Eventual greenlight PVP

## 2021-10-19 NOTE — H&P
[facility] [place]  Urology H&P Note     Patient:  Jamaal Plummer  MRN: [id number]  YOB: 1945    ATTENDING: Alfredo Bragg MD     CHIEF COMPLAINT:  Bladder cancer    HISTORY OF PRESENT ILLNESS:   The patient is a 67 y.o. male who presents with history of bladder cancer and possible recurrence    Patient's old records, notes and chart reviewed and summarized above. Past Medical History:    Past Medical History:   Diagnosis Date    Arthritis     CAD (coronary artery disease)     Chronic obstructive pulmonary disease ([address] 9/17/2019    GERD (gastroesophageal reflux disease)     Hyperglycemia 3/18/2016    Hyperlipidemia     Hypertension     IBS (irritable bowel syndrome)     Kidney stones     Malignant neoplasm of trigone of urinary bladder (HCC)     Obstructive sleep apnea     wears cpap    Prostatitis        Past Surgical History:    Past Surgical History:   Procedure Laterality Date    ABDOMEN SURGERY      BACK SURGERY  07/08/2013    Lumbar Coyle L2-S-1, revision, PLIF L5-S-1 w/ grafting    CARDIAC CATHETERIZATION  9/2007    COLON SURGERY  2008    Dr. Juve Prieto  2011? Dr. Adan Chaney  2007    CYSTOSCOPY N/A 2/28/2020    CYSTOSCOPY TRANSURETHRAL RESECTION BLADDER TUMOR performed by Sheryl Figueredo MD at [address] [place] [address] 10/4/2021    CYSTOSCOPY, BLADDER BIOPSY WITH FULGURATION performed by Ulysses Grieve., MD at [address] [place] [address] Ave, ESOPHAGUS      ENDOSCOPY, COLON, DIAGNOSTIC      HERNIA REPAIR  2005    Dr. Elvira Ponce Right 3/25/2014    right total hip    OTHER SURGICAL HISTORY  08/25/2016    TURBT by Dr Saucedo Salvage       Medications Prior to Admission:   Prior to Admission medications    Medication Sig Start Date End Date Taking?  Authorizing Provider   Calcium Polycarbophil (FIBER-CAPS PO) Take 2 tablets by mouth daily   Yes Historical Provider, MD   metoprolol tartrate (LOPRESSOR) 25 MG tablet Take 1 tablet by mouth daily 9/7/21  Yes Levar Clark MD   troium [facility]) 20 MG tablet Take 1 tablet by mouth 2 times daily 3/11/21  Yes Junior Latoya MD   dicyclomine (BENTYL) 20 MG tablet Take 1 tablet by mouth 2 times daily 3/8/21  Yes Levar Clark MD   lisinopril (PRINIVIL;ZESTRIL) 10 MG tablet Take 1 tablet by mouth daily 3/8/21  Yes Levar Clark MD   atorvastatin (LIPITOR) 40 MG tablet Take 1 tablet by mouth nightly 3/8/21  Yes Levar Clark MD   tamsulosin [facility]) 0.4 MG capsule Take 2 capsules by mouth nightly 12/8/20 12/8/21 Yes Levar Clark MD   aspirin 81 MG EC tablet Take 81 mg by mouth daily   Yes Historical Provider, MD   Multiple Vitamins-Minerals (PRESERVISION AREDS) TABS Take by mouth 2 times daily    Yes Historical Provider, MD   UNABLE TO FIND Apple cider vinegar 625mg daily   Yes Historical Provider, MD   NONFORMULARY Take 450 mg by mouth 2 times daily   Yes Historical Provider, MD   nitroGLYCERIN (NITROSTAT) 0.4 MG SL tablet Place 1 tablet under the tongue every 5 minutes as needed for Chest pain 6/13/17  Yes Levar Clark MD   Cinnamon 500 MG CAPS Take 500 mg by mouth Daily   Yes Historical Provider, MD   Milk Thistle 1000 MG CAPS Take 1,000 mg by mouth Daily   Yes Historical Provider, MD   Ubiquinol 100 MG CAPS Take 1 tablet by mouth daily. Yes Historical Provider, MD   Bilberry 100 MG CAPS Take 100 mg by mouth Daily    Yes Historical Provider, MD   fish oil-omega-3 fatty acids 1000 MG capsule Take 1 g by mouth daily. Yes Historical Provider, MD   Chromium Picolinate 500 MCG TABS Take 500 mg by mouth daily. Yes Historical Provider, MD   Vitamin E 400 UNIT TABS Take 400 mg by mouth daily. Yes Historical Provider, MD   Ascorbic Acid (VITAMIN C CR) 500 MG TBCR Take 500 mg by mouth daily.      Yes Historical Provider, MD   Methylsulfonylmethane (MSM) 1500 MG TABS Take 1,500 mg by mouth daily. Yes Historical Provider, MD   Lycopene 10 MG CAPS Take by mouth daily 2 cap   Yes Historical Provider, MD   Multiple Vitamin (MULTIVITAMIN PO) Take 1 tablet by mouth daily. Yes Historical Provider, MD   Cholecalciferol (VITAMIN D3) 2000 UNIT TABS Take 2,000 mg by mouth daily. Yes Historical Provider, MD   Pyridoxine HCl (VITAMIN B-6) 50 MG tablet Take 50 mg by mouth 2 times daily. Yes Historical Provider, MD   Vitamins-Lipotropics (BALANCED B-50 COMPLEX PO) Take  by mouth 2 times daily. Yes Historical Provider, MD   NONFORMULARY as needed Diltiazem 2% gel apply to rectal fissure daily as directed    Historical Provider, MD   hyoscyamine (NULEV) 125 MCG TBDP dispersible tablet Take 1 tablet by mouth every 4 hours as needed (abd pain) 20   Brigette Hui MD       Allergies:  Avodart [dutasteride] and Myrbetriq [mirabegron]    Social History:    Social History     Socioeconomic History    Marital status:      Spouse name: Not on file    Number of children: Not on file    Years of education: Not on file    Highest education level: Not on file   Occupational History     Employer: [address] [place] [address]   Tobacco Use    Smoking status: Former Smoker     Packs/day: 1.00     Years: 25.00     Pack years: 25.00     Quit date: 1995     Years since quittin.8    Smokeless tobacco: Former User   Vaping Use    Vaping Use: Never assessed   Substance and Sexual Activity    Alcohol use:  Yes     Alcohol/week: 1.0 standard drinks     Types: 1 Cans of beer per week     Comment: seldom    Drug use: No    Sexual activity: Not Currently     Partners: Female   Other Topics Concern    Not on file   Social History Narrative    Not on file     Social Determinants of Health     Financial Resource Strain:     Difficulty of Paying Living Expenses:    Food Insecurity:     Worried About Running Out of Food in the Last Year:     [address] in the Last Year:    Transportation Needs: Malignant neoplasm of posterior wall of urinary bladder (HCC)    BPH with obstruction/lower urinary tract symptoms    Anxiety    Contusion of right foot    History of bladder cancer       Plan:   Cysto bladder biopsy with fulguration

## 2021-12-01 LAB
ABSOLUTE BASO #: 0 X10E9/L (ref 0–0.2)
ABSOLUTE EOS #: 0.3 X10E9/L (ref 0–0.4)
ABSOLUTE LYMPH #: 2.9 X10E9/L (ref 1–3.5)
ABSOLUTE MONO #: 0.6 X10E9/L (ref 0–0.9)
ABSOLUTE NEUT #: 3.6 X10E9/L (ref 1.5–6.6)
ALBUMIN SERPL-MCNC: 4.3 G/DL (ref 3.2–5.3)
ALK PHOSPHATASE: 81 U/L (ref 39–130)
ALT SERPL-CCNC: 23 U/L (ref 0–40)
ANION GAP SERPL CALCULATED.3IONS-SCNC: 8 MMOL/L (ref 5–15)
AST SERPL-CCNC: 24 U/L (ref 0–41)
BASOPHILS RELATIVE PERCENT: 0.6 %
BILIRUB SERPL-MCNC: 1.6 MG/DL (ref 0.3–1.2)
BUN BLDV-MCNC: 19 MG/DL (ref 5–27)
CALCIUM SERPL-MCNC: 9 MG/DL (ref 8.5–10.5)
CHLORIDE BLD-SCNC: 105 MMOL/L (ref 98–109)
CHOLESTEROL/HDL RATIO: 2.4 (ref 1–5)
CHOLESTEROL: 132 MG/DL (ref 150–200)
CO2: 28 MMOL/L (ref 22–32)
CREAT SERPL-MCNC: 1 MG/DL (ref 0.6–1.3)
EGFR AFRICAN AMERICAN: >60 ML/MIN/1.73SQ.M
EGFR IF NONAFRICAN AMERICAN: >60 ML/MIN/1.73SQ.M
EOSINOPHILS RELATIVE PERCENT: 3.8 %
GLUCOSE: 120 MG/DL (ref 65–99)
HCT VFR BLD CALC: 41 % (ref 39–49)
HDLC SERPL-MCNC: 54 MG/DL
HEMOGLOBIN: 14 G/DL (ref 13–17)
LDL CHOLESTEROL CALCULATED: 65 MG/DL
LDL/HDL RATIO: 1.2
LYMPHOCYTE %: 38.7 %
MCH RBC QN AUTO: 31.1 PG (ref 27–34)
MCHC RBC AUTO-ENTMCNC: 34.3 G/DL (ref 32–36)
MCV RBC AUTO: 91 FL (ref 80–100)
MONOCYTES # BLD: 8.6 %
NEUTROPHILS RELATIVE PERCENT: 48.3 %
PDW BLD-RTO: 13.1 % (ref 11.5–15)
PLATELETS: 194 X10E9/L (ref 150–450)
PMV BLD AUTO: 9.5 FL (ref 7–12)
POTASSIUM SERPL-SCNC: 4 MMOL/L (ref 3.5–5)
RBC: 4.52 X10E12/L (ref 4.1–5.7)
SODIUM BLD-SCNC: 141 MMOL/L (ref 134–146)
TOTAL PROTEIN: 7.2 G/DL (ref 6–8)
TRIGL SERPL-MCNC: 66 MG/DL (ref 27–150)
VLDLC SERPL CALC-MCNC: 13 MG/DL (ref 0–30)
WBC: 7.4 X10E9/L (ref 4–11)

## 2021-12-09 ENCOUNTER — OFFICE VISIT (OUTPATIENT)
Dept: FAMILY MEDICINE CLINIC | Age: 76
End: 2021-12-09
Payer: MEDICARE

## 2021-12-09 VITALS
OXYGEN SATURATION: 98 % | SYSTOLIC BLOOD PRESSURE: 110 MMHG | HEART RATE: 70 BPM | RESPIRATION RATE: 18 BRPM | WEIGHT: 247.9 LBS | BODY MASS INDEX: 33.62 KG/M2 | DIASTOLIC BLOOD PRESSURE: 64 MMHG | TEMPERATURE: 98.1 F

## 2021-12-09 DIAGNOSIS — M75.52 BURSITIS OF BOTH SHOULDERS: Primary | ICD-10-CM

## 2021-12-09 DIAGNOSIS — N40.1 BPH WITH OBSTRUCTION/LOWER URINARY TRACT SYMPTOMS: ICD-10-CM

## 2021-12-09 DIAGNOSIS — I10 ESSENTIAL HYPERTENSION, BENIGN: ICD-10-CM

## 2021-12-09 DIAGNOSIS — E78.5 HYPERLIPIDEMIA, UNSPECIFIED HYPERLIPIDEMIA TYPE: ICD-10-CM

## 2021-12-09 DIAGNOSIS — C67.0 MALIGNANT NEOPLASM OF TRIGONE OF URINARY BLADDER (HCC): ICD-10-CM

## 2021-12-09 DIAGNOSIS — N13.8 BPH WITH OBSTRUCTION/LOWER URINARY TRACT SYMPTOMS: ICD-10-CM

## 2021-12-09 DIAGNOSIS — M75.51 BURSITIS OF BOTH SHOULDERS: Primary | ICD-10-CM

## 2021-12-09 PROCEDURE — G8484 FLU IMMUNIZE NO ADMIN: HCPCS | Performed by: FAMILY MEDICINE

## 2021-12-09 PROCEDURE — G8417 CALC BMI ABV UP PARAM F/U: HCPCS | Performed by: FAMILY MEDICINE

## 2021-12-09 PROCEDURE — G8427 DOCREV CUR MEDS BY ELIG CLIN: HCPCS | Performed by: FAMILY MEDICINE

## 2021-12-09 PROCEDURE — 4040F PNEUMOC VAC/ADMIN/RCVD: CPT | Performed by: FAMILY MEDICINE

## 2021-12-09 PROCEDURE — 1036F TOBACCO NON-USER: CPT | Performed by: FAMILY MEDICINE

## 2021-12-09 PROCEDURE — 96372 THER/PROPH/DIAG INJ SC/IM: CPT | Performed by: FAMILY MEDICINE

## 2021-12-09 PROCEDURE — 1123F ACP DISCUSS/DSCN MKR DOCD: CPT | Performed by: FAMILY MEDICINE

## 2021-12-09 PROCEDURE — 99214 OFFICE O/P EST MOD 30 MIN: CPT | Performed by: FAMILY MEDICINE

## 2021-12-09 RX ORDER — METHYLPREDNISOLONE ACETATE 80 MG/ML
160 INJECTION, SUSPENSION INTRA-ARTICULAR; INTRALESIONAL; INTRAMUSCULAR; SOFT TISSUE ONCE
Status: COMPLETED | OUTPATIENT
Start: 2021-12-09 | End: 2021-12-09

## 2021-12-09 RX ORDER — TAMSULOSIN HYDROCHLORIDE 0.4 MG/1
0.8 CAPSULE ORAL NIGHTLY
Qty: 180 CAPSULE | Refills: 3 | Status: SHIPPED | OUTPATIENT
Start: 2021-12-09 | End: 2022-10-25 | Stop reason: DRUGHIGH

## 2021-12-09 RX ORDER — OMEPRAZOLE 20 MG/1
CAPSULE, DELAYED RELEASE ORAL
Qty: 90 CAPSULE | Refills: 3 | Status: SHIPPED | OUTPATIENT
Start: 2021-12-09

## 2021-12-09 RX ORDER — OMEPRAZOLE 20 MG/1
CAPSULE, DELAYED RELEASE ORAL
COMMUNITY
Start: 2021-02-25 | End: 2021-12-09 | Stop reason: SDUPTHER

## 2021-12-09 RX ORDER — HYOSCYAMINE SULFATE 0.125 MG
0.12 TABLET,DISINTEGRATING ORAL EVERY 4 HOURS PRN
Qty: 360 TABLET | Refills: 3 | Status: SHIPPED | OUTPATIENT
Start: 2021-12-09 | End: 2022-08-05 | Stop reason: SDUPTHER

## 2021-12-09 RX ORDER — LISINOPRIL 10 MG/1
10 TABLET ORAL DAILY
Qty: 90 TABLET | Refills: 3 | Status: SHIPPED | OUTPATIENT
Start: 2021-12-09

## 2021-12-09 RX ADMIN — METHYLPREDNISOLONE ACETATE 160 MG: 80 INJECTION, SUSPENSION INTRA-ARTICULAR; INTRALESIONAL; INTRAMUSCULAR; SOFT TISSUE at 14:34

## 2021-12-09 SDOH — ECONOMIC STABILITY: FOOD INSECURITY: WITHIN THE PAST 12 MONTHS, YOU WORRIED THAT YOUR FOOD WOULD RUN OUT BEFORE YOU GOT MONEY TO BUY MORE.: NEVER TRUE

## 2021-12-09 SDOH — ECONOMIC STABILITY: FOOD INSECURITY: WITHIN THE PAST 12 MONTHS, THE FOOD YOU BOUGHT JUST DIDN'T LAST AND YOU DIDN'T HAVE MONEY TO GET MORE.: NEVER TRUE

## 2021-12-09 ASSESSMENT — PATIENT HEALTH QUESTIONNAIRE - PHQ9
2. FEELING DOWN, DEPRESSED OR HOPELESS: 0
SUM OF ALL RESPONSES TO PHQ9 QUESTIONS 1 & 2: 0
SUM OF ALL RESPONSES TO PHQ QUESTIONS 1-9: 0
SUM OF ALL RESPONSES TO PHQ QUESTIONS 1-9: 0
1. LITTLE INTEREST OR PLEASURE IN DOING THINGS: 0
SUM OF ALL RESPONSES TO PHQ QUESTIONS 1-9: 0

## 2021-12-09 ASSESSMENT — SOCIAL DETERMINANTS OF HEALTH (SDOH): HOW HARD IS IT FOR YOU TO PAY FOR THE VERY BASICS LIKE FOOD, HOUSING, MEDICAL CARE, AND HEATING?: NOT HARD AT ALL

## 2021-12-09 NOTE — PROGRESS NOTES
Administrations This Visit     methylPREDNISolone acetate (DEPO-MEDROL) injection 160 mg     Admin Date  12/09/2021  14:34 Action  Given Dose  160 mg Route  IntraMUSCular Site  Deltoid Left Administered By  Lara Laurent RN    Ordering Provider: Akin Sorenson MD    NDC: 9307-4383-57    Lot#: [id number]    : Hermila Rodriguez.     Patient Supplied?: No

## 2021-12-12 ASSESSMENT — ENCOUNTER SYMPTOMS
VOMITING: 0
DIARRHEA: 0
RHINORRHEA: 0
SORE THROAT: 0
CONSTIPATION: 0
BACK PAIN: 0
ABDOMINAL PAIN: 0
NAUSEA: 0
COUGH: 0
WHEEZING: 0
SHORTNESS OF BREATH: 0

## 2021-12-12 NOTE — PROGRESS NOTES
[address] [address] [address] [facility] [address]  Dept: [phone]  Dept Fax: [phone]  Loc: [phone]  PROGRESS NOTE      VisitDate: 12/9/2021    Raman Rodriguez is a 67 y.o. male who presents today for:     Chief Complaint   Patient presents with    Follow-up     9mo fu, HTN, hyperlipidemia, OA, BPH    Discuss Labs    Shoulder Pain     bilateral shoulder pain, waking him at night, requesting depo medrol injection         Subjective:  HPI  Patient comes in for follow-up hypertension, stable well-controlled. Follow-up hyperlipidemia stable. History of bladder cancer asymptomatic followed by urology. Complains of bilateral shoulder pain keeping him awake at night pain increases with activity, no acute injury no radicular symptoms    Review of Systems   Constitutional: Negative for chills, fatigue and fever. HENT: Negative for congestion, rhinorrhea and sore throat. Respiratory: Negative for cough, shortness of breath and wheezing. Cardiovascular: Negative for chest pain, palpitations and leg swelling. Gastrointestinal: Negative for abdominal pain, constipation, diarrhea, nausea and vomiting. Genitourinary: Negative for dysuria, frequency and urgency. Musculoskeletal: Positive for arthralgias. Negative for back pain and joint swelling. Skin: Negative for rash. Neurological: Negative for dizziness, light-headedness, numbness and headaches.      Past Medical History:   Diagnosis Date    Arthritis     CAD (coronary artery disease)     Chronic obstructive pulmonary disease ([facility] [address] 9/17/2019    GERD (gastroesophageal reflux disease)     Hyperglycemia 3/18/2016    Hyperlipidemia     Hypertension     IBS (irritable bowel syndrome)     Kidney stones     Malignant neoplasm of trigone of urinary bladder (HCC)     Obstructive sleep apnea     wears cpap    Prostatitis       Past Surgical History:   Procedure Laterality Date  ABDOMEN SURGERY      BACK SURGERY  2013    Lumbar Coyle L2-S-1, revision, PLIF L5-S-1 w/ grafting    CARDIAC CATHETERIZATION  2007    COLON SURGERY      Dr. Salmon 2011? Dr. Oli Apple      CYSTOSCOPY N/A 2020    CYSTOSCOPY TRANSURETHRAL RESECTION BLADDER TUMOR performed [facility] Chuckie Jeffery MD [facility] N/A 10/4/2021    CYSTOSCOPY, BLADDER BIOPSY WITH FULGURATION performed by Haile Bhagat MD at [facility], ESOPHAGUS      ENDOSCOPY, COLON, DIAGNOSTIC     [address] [facility]      Dr. Dorothy Chopra Right 3/25/2014    right total hip    OTHER SURGICAL HISTORY  2016    TURBT by Dr Bigg Fisher     Family History   Problem Relation Age of Onset    Heart Disease Mother     Diabetes Mother     Kidney Disease Father     Cancer Brother         colon     Social History     Tobacco Use    Smoking status: Former Smoker     Packs/day: 1.00     Years: 25.00     Pack years: 25.00     Quit date: 1995     Years since quittin.9    Smokeless tobacco: Former User   Substance Use Topics    Alcohol use:  Yes     Alcohol/week: 1.0 standard drink     Types: 1 Cans of beer per week     Comment: seldom      Current Outpatient Medications   Medication Sig Dispense Refill    hyoscyamine (NULEV) 125 MCG TBDP dispersible tablet Take 1 tablet by mouth every 4 hours as needed (abd pain) 360 tablet 3    tamsulosin (FLOMAX) 0.4 MG capsule Take 2 capsules by mouth nightly 180 capsule 3    omeprazole (PRILOSEC) 20 MG delayed release capsule Take one tablet by mouth daily 90 capsule 3    lisinopril (PRINIVIL;ZESTRIL) 10 MG tablet Take 1 tablet by mouth daily 90 tablet 3    Calcium Polycarbophil (FIBER-CAPS PO) Take 2 tablets by mouth daily      NONFORMULARY as needed Diltiazem 2% gel apply to rectal fissure daily as directed      metoprolol tartrate (LOPRESSOR) 25 MG tablet Take 1 tablet by mouth daily 90 tablet 1    trospium (SANCTURA) 20 MG tablet Take 1 tablet by mouth 2 times daily 180 tablet 3    dicyclomine (BENTYL) 20 MG tablet Take 1 tablet by mouth 2 times daily 180 tablet 3    atorvastatin (LIPITOR) 40 MG tablet Take 1 tablet by mouth nightly 90 tablet 3    aspirin 81 MG EC tablet Take 81 mg by mouth daily      Multiple Vitamins-Minerals (PRESERVISION AREDS) TABS Take by mouth 2 times daily       UNABLE TO FIND Apple cider vinegar 625mg daily      NONFORMULARY Take 450 mg by mouth 2 times daily      nitroGLYCERIN (NITROSTAT) 0.4 MG SL tablet Place 1 tablet under the tongue every 5 minutes as needed for Chest pain 25 tablet 5    Cinnamon 500 MG CAPS Take 500 mg by mouth Daily      Milk Thistle 1000 MG CAPS Take 1,000 mg by mouth Daily      Ubiquinol 100 MG CAPS Take 1 tablet by mouth daily.  Bilberry 100 MG CAPS Take 100 mg by mouth Daily       fish oil-omega-3 fatty acids 1000 MG capsule Take 1 g by mouth daily.  Chromium Picolinate 500 MCG TABS Take 500 mg by mouth daily.  Vitamin E 400 UNIT TABS Take 400 mg by mouth daily.  Ascorbic Acid (VITAMIN C CR) 500 MG TBCR Take 500 mg by mouth daily.  Methylsulfonylmethane (MSM) 1500 MG TABS Take 1,500 mg by mouth daily.  Lycopene 10 MG CAPS Take by mouth daily 2 cap      Multiple Vitamin (MULTIVITAMIN PO) Take 1 tablet by mouth daily.  Cholecalciferol (VITAMIN D3) 2000 UNIT TABS Take 2,000 mg by mouth daily.  Pyridoxine HCl (VITAMIN B-6) 50 MG tablet Take 50 mg by mouth 2 times daily.  Vitamins-Lipotropics (BALANCED B-50 COMPLEX PO) Take  by mouth 2 times daily. No current facility-administered medications for this visit.      Allergies   Allergen Reactions    Avodart [Dutasteride] Other (See Comments)     Chest tightness    Myrbetriq [Mirabegron] Other (See Comments)     Red spots     Health Maintenance   Topic Date Due    Hepatitis C screen  Never done    DTaP/Tdap/Td vaccine (1 - Tdap) 03/20/2014    Annual Wellness Visit (AWV)  06/09/2021    Lipid screen  12/01/2022    Potassium monitoring  12/01/2022    Creatinine monitoring  12/01/2022    Flu vaccine  Completed    Shingles Vaccine  Completed    Pneumococcal 65+ years Vaccine  Completed    COVID-19 Vaccine  Completed    Hepatitis A vaccine  Aged Out    Hepatitis B vaccine  Aged Out    Hib vaccine  Aged Out    Meningococcal (ACWY) vaccine  Aged Out         Objective:     Physical Exam  Constitutional:       General: He is not in acute distress. Appearance: He is well-developed. He is not diaphoretic. HENT:      Head: Normocephalic and atraumatic. Right Ear: External ear normal.      Left Ear: External ear normal.   Eyes:      Conjunctiva/sclera: Conjunctivae normal.   Neck:      Vascular: No JVD. Cardiovascular:      Rate and Rhythm: Normal rate and regular rhythm. Heart sounds: Normal heart sounds. Pulmonary:      Effort: Pulmonary effort is normal.      Breath sounds: Normal breath sounds. No wheezing or rales. Musculoskeletal:         General: Tenderness present. No swelling. Comments: Tenderness in the subacromial areas bilaterally Castillo and Neer's impingement signs are positive bilaterally no rotator cuff weakness   Skin:     General: Skin is warm and dry. Coloration: Skin is not pale. Neurological:      Mental Status: He is alert and oriented to person, place, and time. /64 (Site: Left Upper Arm)   Pulse 70   Temp 98.1 °F (36.7 °C) (Oral)   Resp 18   Wt 247 lb 14.4 oz (112.4 kg)   SpO2 98%   BMI 33.62 kg/m²       Impression/Plan:  1. Bursitis of both shoulders    2. BPH with obstruction/lower urinary tract symptoms    3. Malignant neoplasm of trigone of urinary bladder (HCC)    4. Hyperlipidemia, unspecified hyperlipidemia type    5.  Essential hypertension, benign      Requested Prescriptions     Signed Prescriptions Disp Refills    hyoscyamine (NULEV) 125 MCG TBDP dispersible tablet 360 tablet 3     Sig: Take 1 tablet by mouth every 4 hours as needed (abd pain)    tamsulosin (FLOMAX) 0.4 MG capsule 180 capsule 3     Sig: Take 2 capsules by mouth nightly    omeprazole (PRILOSEC) 20 MG delayed release capsule 90 capsule 3     Sig: Take one tablet by mouth daily    lisinopril (PRINIVIL;ZESTRIL) 10 MG tablet 90 tablet 3     Sig: Take 1 tablet by mouth daily     No orders of the defined types were placed in this encounter. Methylprednisolone 160 IM    Patient giveneducational materials - see patient instructions. Discussed use, benefit, and side effects of prescribed medications. All patient questions answered. Pt voiced understanding. Reviewed health maintenance. Patient agreedwith treatment plan. Follow up as directed. **This report has been created using voice recognition software. It may contain minor errorswhich are inherent in voice recognition technology. **       Electronically signed by Derek Lunsford MD on 12/12/2021 at 6:56 AM

## 2022-01-10 ENCOUNTER — TELEPHONE (OUTPATIENT)
Dept: UROLOGY | Age: 77
End: 2022-01-10

## 2022-01-10 NOTE — TELEPHONE ENCOUNTER
Ed woke up this morning not feeling well, \" has some kind of crud and didn't want to bring it to the office\". So he cancelled his appt today. I could not r/s until 2-14-21. Did you want me to double book your schedule and get him in sooner?  Please advise

## 2022-01-21 ENCOUNTER — OFFICE VISIT (OUTPATIENT)
Dept: UROLOGY | Age: 77
End: 2022-01-21
Payer: MEDICARE

## 2022-01-21 VITALS
BODY MASS INDEX: 33.05 KG/M2 | DIASTOLIC BLOOD PRESSURE: 78 MMHG | SYSTOLIC BLOOD PRESSURE: 120 MMHG | WEIGHT: 244 LBS | HEIGHT: 72 IN

## 2022-01-21 DIAGNOSIS — C67.8 MALIGNANT NEOPLASM OF OVERLAPPING SITES OF BLADDER (HCC): Primary | ICD-10-CM

## 2022-01-21 PROCEDURE — 1123F ACP DISCUSS/DSCN MKR DOCD: CPT | Performed by: UROLOGY

## 2022-01-21 PROCEDURE — 1036F TOBACCO NON-USER: CPT | Performed by: UROLOGY

## 2022-01-21 PROCEDURE — G8417 CALC BMI ABV UP PARAM F/U: HCPCS | Performed by: UROLOGY

## 2022-01-21 PROCEDURE — G8427 DOCREV CUR MEDS BY ELIG CLIN: HCPCS | Performed by: UROLOGY

## 2022-01-21 PROCEDURE — G8484 FLU IMMUNIZE NO ADMIN: HCPCS | Performed by: UROLOGY

## 2022-01-21 PROCEDURE — 81003 URINALYSIS AUTO W/O SCOPE: CPT | Performed by: UROLOGY

## 2022-01-21 PROCEDURE — 4040F PNEUMOC VAC/ADMIN/RCVD: CPT | Performed by: UROLOGY

## 2022-01-21 PROCEDURE — 99214 OFFICE O/P EST MOD 30 MIN: CPT | Performed by: UROLOGY

## 2022-01-21 NOTE — PROGRESS NOTES
Dr. Maggie Wan MD MD  [facility] Consultation / New Patient Visit    Patient:  Natanael Rivas  YOB: 1945  Date: 1/21/2022  Consult requested from Ly Segal MD     HISTORY OF PRESENT ILLNESS:   The patient is a 67 y.o. male who presents today for follow-up for the following problem(s): history of bladder cancer, BPH with LUTs  Overall the problem(s) : are worsening. Associated Symptoms: No dysuria, gross hematuria. Pain Severity:      Today visit:   1/21/22   Flomax (0.8 mg) & [place] (20 mg BID)  Sp Cysto bladder biopsy  Bladder, biopsy:             Benign urothelial mucosa with von Brunn's nests.             Negative for malignancy. Cystoscopy Operative Note  Surgeon: Maggie Wan MD   Anesthesia: Urethral 2%  Indications: bladder cancer  Position: supine  Findings:   The patient was prepped and draped in the usual sterile fashion. The flexible cystoscope was advanced through the urethra and into the bladder. The bladder was thoroughly inspected and the following was noted:    Residual Urine:  Significant  Urethra: No abnormalities of the urethra are noted. Prostate: Large gland Complete obstruction by lateral & small median lobe of prostate. Bladder: No tumors or CIS noted. No bladder diverticulum. Severe trabeculation noted. Ureters: Clear efflux from both ureters. Orifices with normal configuration and location. The cystoscope was removed. The patient tolerated the procedure well. 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   He is on Flomax and Marlen Maeser and Praful with respectable improvement although not completely satisfied. He feels he does not empty his bladder completely. He has nocturia x2.  UA today: WNL  PVR today: 105 mL. Last several PSA's:  Lab Results   Component Value Date    PSA 0.8 05/19/2016    PSA 0.7 10/06/2015    PSA 0.8 12/16/2014       Last total testosterone:  No results found for: TESTOSTERONE    Urinalysis today:  No results found for this visit on 01/21/22. Last BUN and creatinine:  Lab Results   Component Value Date    BUN 19 12/01/2021     Lab Results   Component Value Date    CREATININE 1.00 12/01/2021       Imaging Reviewed during this Office Visit:   (results were independently reviewed by physician and radiology report verified)    PAST MEDICAL, FAMILY AND SOCIAL HISTORY:  Past Medical History:   Diagnosis Date    Arthritis     CAD (coronary artery disease)     Chronic obstructive pulmonary disease ([address] 9/17/2019    GERD (gastroesophageal reflux disease)     Hyperglycemia 3/18/2016    Hyperlipidemia     Hypertension     IBS (irritable bowel syndrome)     Kidney stones     Malignant neoplasm of trigone of urinary bladder ([address]     Obstructive sleep apnea     wears cpap    Prostatitis      Past Surgical History:   Procedure Laterality Date    ABDOMEN SURGERY      BACK SURGERY  07/08/2013    Lumbar Coyle L2-S-1, revision, PLIF L5-S-1 w/ grafting    CARDIAC CATHETERIZATION  9/2007    COLON SURGERY  2008    Dr. Tony Alarcon  2011?     Dr. Raffaele Sawyer  2007    CYSTOSCOPY N/A 2/28/2020    CYSTOSCOPY TRANSURETHRAL RESECTION BLADDER TUMOR performed by Sydnee Smith MD at [address] 10/4/2021    CYSTOSCOPY, BLADDER BIOPSY WITH FULGURATION performed by Heydi Ferris MD at [address], ESOPHAGUS      ENDOSCOPY, COLON, DIAGNOSTIC      HERNIA REPAIR  2005    Dr. Aranza Tavera Right 3/25/2014    right total hip    OTHER SURGICAL HISTORY  08/25/2016    TURBT by Dr Francesco Alvarez     Family History   Problem Relation Age of Onset    Heart Disease Mother     Diabetes Mother     Kidney Disease Father     Cancer Brother         colon     No outpatient medications have been marked as taking for the 22 encounter (Appointment) with Vi Ornelas MD.       Avodart [dutasteride] and Myrbetriq [mirabegron]  Social History     Tobacco Use   Smoking Status Former Smoker    Packs/day: 1.00    Years: 25.00    Pack years: 25.00    Quit date: 1995    Years since quittin.0   Smokeless Tobacco Former User       Social History     Substance and Sexual Activity   Alcohol Use Yes    Alcohol/week: 1.0 standard drink    Types: 1 Cans of beer per week    Comment: seldom       REVIEW OF SYSTEMS:  Constitutional: negative  Eyes: negative  Respiratory: negative  Cardiovascular: negative  Gastrointestinal: negative  Musculoskeletal: negative  Genitourinary: negative  Skin: negative   Neurological: negative  Hematological/Lymphatic: negative  Psychological: negative    Physical Exam:    This a 67 y.o. male   There were no vitals filed for this visit. Constitutional: Patient in no acute distress   Neuro: alert and oriented to person place and time. Psych: Mood and affect normal.  Head: atraumatic normocephalic  Eyes: EOMi  HEENT: neck supple, trachea midline  Lungs: Respiratory effort normal  Cardiovascular:  Normal peripheral pulses  Abdomen: Soft, non-tender, non-distended, No CVA  Bladder: non-tender and not distended. FROMx4, no cyanosis clubbing edema  Skin: warm and dry      Assessment and Plan      1. Malignant neoplasm of overlapping sites of bladder [facility])           Plan:      No follow-ups on file. Cysto bladder biopsy with fulguration - negative, usman nest - will monitor  Offered greenlight PVP for BPH with LUTs - will delay to avoid CoVid exposure.   Continue flomax and finasteride  - Follow up 6 months

## 2022-02-28 RX ORDER — TROSPIUM CHLORIDE 20 MG/1
20 TABLET, FILM COATED ORAL 2 TIMES DAILY
Qty: 180 TABLET | Refills: 3 | Status: SHIPPED | OUTPATIENT
Start: 2022-02-28 | End: 2022-03-02 | Stop reason: SDUPTHER

## 2022-02-28 NOTE — TELEPHONE ENCOUNTER
Aiden Dorado called requesting a refill on the following medications:  Requested Prescriptions     Pending Prescriptions Disp Refills    trospium (SANCTURA) 20 MG tablet 180 tablet 3     Sig: Take 1 tablet by mouth 2 times daily     Pharmacy verified:  [email or web address]      Date of last visit:   Date of next visit (if applicable): 0/54/0129        I have pended the order

## 2022-03-03 NOTE — TELEPHONE ENCOUNTER
Nambasia Gaffneyh called requesting a refill on the following medications:  Requested Prescriptions     Pending Prescriptions Disp Refills    trospium (SANCTURA) 20 MG tablet 180 tablet 3     Sig: Take 1 tablet by mouth 2 times daily     Pharmacy verified:  [email or web address]      Date of last visit: 10/11/2021  Date of next visit (if applicable): 3/06/1848

## 2022-03-04 RX ORDER — TROSPIUM CHLORIDE 20 MG/1
20 TABLET, FILM COATED ORAL 2 TIMES DAILY
Qty: 180 TABLET | Refills: 3 | Status: SHIPPED | OUTPATIENT
Start: 2022-03-04 | End: 2022-06-09 | Stop reason: DRUGHIGH

## 2022-03-09 ENCOUNTER — TELEPHONE (OUTPATIENT)
Dept: UROLOGY | Age: 77
End: 2022-03-09

## 2022-03-09 NOTE — TELEPHONE ENCOUNTER
Original prior auth denied for trospium. Called [employer] spoke with Irish Ruvalcaba. Sent for expedited appeal  Call back [phone]  Fax [phone]  Ref# [id number]  Will hear back in 72 hours.

## 2022-03-10 NOTE — TELEPHONE ENCOUNTER
Denial of appeal sent from [facility] Called patient and he stated that he is getting his script with good rx.

## 2022-04-18 RX ORDER — DICYCLOMINE HCL 20 MG
20 TABLET ORAL 2 TIMES DAILY
Qty: 180 TABLET | Refills: 3 | Status: SHIPPED | OUTPATIENT
Start: 2022-04-18

## 2022-05-09 RX ORDER — ATORVASTATIN CALCIUM 40 MG/1
40 TABLET, FILM COATED ORAL NIGHTLY
Qty: 90 TABLET | Refills: 3 | Status: SHIPPED | OUTPATIENT
Start: 2022-05-09

## 2022-05-26 SDOH — HEALTH STABILITY: PHYSICAL HEALTH: ON AVERAGE, HOW MANY DAYS PER WEEK DO YOU ENGAGE IN MODERATE TO STRENUOUS EXERCISE (LIKE A BRISK WALK)?: 3 DAYS

## 2022-05-26 SDOH — HEALTH STABILITY: PHYSICAL HEALTH: ON AVERAGE, HOW MANY MINUTES DO YOU ENGAGE IN EXERCISE AT THIS LEVEL?: 0 MIN

## 2022-05-26 ASSESSMENT — PATIENT HEALTH QUESTIONNAIRE - PHQ9
SUM OF ALL RESPONSES TO PHQ9 QUESTIONS 1 & 2: 0
SUM OF ALL RESPONSES TO PHQ QUESTIONS 1-9: 0
2. FEELING DOWN, DEPRESSED OR HOPELESS: 0
SUM OF ALL RESPONSES TO PHQ QUESTIONS 1-9: 0
SUM OF ALL RESPONSES TO PHQ QUESTIONS 1-9: 0
1. LITTLE INTEREST OR PLEASURE IN DOING THINGS: 0
SUM OF ALL RESPONSES TO PHQ QUESTIONS 1-9: 0

## 2022-05-26 ASSESSMENT — LIFESTYLE VARIABLES
HOW OFTEN DO YOU HAVE SIX OR MORE DRINKS ON ONE OCCASION: 1
HOW OFTEN DO YOU HAVE A DRINK CONTAINING ALCOHOL: MONTHLY OR LESS
HOW OFTEN DO YOU HAVE A DRINK CONTAINING ALCOHOL: 2

## 2022-06-09 ENCOUNTER — OFFICE VISIT (OUTPATIENT)
Dept: FAMILY MEDICINE CLINIC | Age: 77
End: 2022-06-09
Payer: MEDICARE

## 2022-06-09 VITALS
RESPIRATION RATE: 18 BRPM | SYSTOLIC BLOOD PRESSURE: 118 MMHG | HEART RATE: 71 BPM | OXYGEN SATURATION: 97 % | BODY MASS INDEX: 32.51 KG/M2 | WEIGHT: 240 LBS | DIASTOLIC BLOOD PRESSURE: 70 MMHG | HEIGHT: 72 IN

## 2022-06-09 DIAGNOSIS — Z00.00 MEDICARE ANNUAL WELLNESS VISIT, SUBSEQUENT: ICD-10-CM

## 2022-06-09 DIAGNOSIS — Z00.00 ROUTINE GENERAL MEDICAL EXAMINATION AT A HEALTH CARE FACILITY: Primary | ICD-10-CM

## 2022-06-09 DIAGNOSIS — I10 ESSENTIAL HYPERTENSION, BENIGN: ICD-10-CM

## 2022-06-09 DIAGNOSIS — M75.82 ROTATOR CUFF TENDONITIS, LEFT: ICD-10-CM

## 2022-06-09 DIAGNOSIS — E78.5 HYPERLIPIDEMIA, UNSPECIFIED HYPERLIPIDEMIA TYPE: ICD-10-CM

## 2022-06-09 DIAGNOSIS — R73.9 HYPERGLYCEMIA: ICD-10-CM

## 2022-06-09 PROCEDURE — G0439 PPPS, SUBSEQ VISIT: HCPCS | Performed by: FAMILY MEDICINE

## 2022-06-09 PROCEDURE — 96372 THER/PROPH/DIAG INJ SC/IM: CPT | Performed by: FAMILY MEDICINE

## 2022-06-09 PROCEDURE — 1123F ACP DISCUSS/DSCN MKR DOCD: CPT | Performed by: FAMILY MEDICINE

## 2022-06-09 RX ORDER — TROSPIUM CHLORIDE 20 MG/1
20 TABLET, FILM COATED ORAL DAILY
Qty: 180 TABLET | Refills: 3 | Status: SHIPPED
Start: 2022-06-09 | End: 2022-09-22

## 2022-06-09 RX ORDER — METHYLPREDNISOLONE ACETATE 80 MG/ML
160 INJECTION, SUSPENSION INTRA-ARTICULAR; INTRALESIONAL; INTRAMUSCULAR; SOFT TISSUE ONCE
Status: COMPLETED | OUTPATIENT
Start: 2022-06-09 | End: 2022-06-09

## 2022-06-09 RX ADMIN — METHYLPREDNISOLONE ACETATE 160 MG: 80 INJECTION, SUSPENSION INTRA-ARTICULAR; INTRALESIONAL; INTRAMUSCULAR; SOFT TISSUE at 15:44

## 2022-06-09 NOTE — PROGRESS NOTES
Administrations This Visit     methylPREDNISolone acetate (DEPO-MEDROL) injection 160 mg     Admin Date  06/09/2022  15:44 Action  Given Dose  160 mg Route  IntraMUSCular Site  Dorsogluteal Left Administered By  Danielle Sexton    Ordering Provider: Dean Jack MD    NDC: [address]-6    Lot#: [id number]    : [address]    Patient Supplied?: No

## 2022-06-12 NOTE — PROGRESS NOTES
Medicare Annual Wellness Visit    Harjit Kohler is here for Medicare AWV (has some things to talk about but only wants to talk to MM) and Shoulder Pain (would like depo-medrol shot for shoulder pain)    Assessment & Plan   Routine general medical examination at a health care facility  Hyperlipidemia, unspecified hyperlipidemia type  -     Lipid Panel; Future  -     Comprehensive Metabolic Panel; Future  Hyperglycemia  -     Hemoglobin A1C; Future  Essential hypertension, benign  -     CBC with Auto Differential; Future  Rotator cuff tendonitis, left  -     methylPREDNISolone acetate (DEPO-MEDROL) injection 160 mg; 160 mg, IntraMUSCular, ONCE, 1 dose, On Thu 6/9/22 at 1545      Recommendations for Preventive Services Due: see orders and patient instructions/AVS.  Recommended screening schedule for the next 5-10 years is provided to the patient in written form: see Patient Instructions/AVS.     Return in about 6 months (around 12/9/2022). Subjective   The following acute and/or chronic problems were also addressed today:   Diagnosis Orders   1. Routine general medical examination at a health care facility     2. Hyperlipidemia, unspecified hyperlipidemia type  Lipid Panel    Comprehensive Metabolic Panel   3. Hyperglycemia  Hemoglobin A1C   4. Essential hypertension, benign  CBC with Auto Differential   5. Rotator cuff tendonitis, left  methylPREDNISolone acetate (DEPO-MEDROL) injection 160 mg         Patient's complete Health Risk Assessment and screening values have been reviewed and are found in Flowsheets. The following problems were reviewed today and where indicated follow up appointments were made and/or referrals ordered.     Positive Risk Factor Screenings with Interventions:              Health Habits/Nutrition:     Physical Activity: Inactive    Days of Exercise per Week: 3 days    Minutes of Exercise per Session: 0 min     Have you lost any weight without trying in the past 3 months?: No  Body mass index: (!) 32.55  Have you seen the dentist within the past year?: Yes    Health Habits/Nutrition Interventions:  · na             Objective   Vitals:    06/09/22 1437   BP: 118/70   Pulse: 71   Resp: 18   SpO2: 97%   Weight: 240 lb (108.9 kg)   Height: 6' (1.829 m)      Body mass index is 32.55 kg/m². General Appearance: alert and oriented to person, place and time, well developed and well- nourished, in no acute distress  Skin: warm and dry, no rash or erythema  Head: normocephalic and atraumatic  Eyes: pupils equal, round, and reactive to light, extraocular eye movements intact, conjunctivae normal  ENT: tympanic membrane, external ear and ear canal normal bilaterally, nose without deformity, nasal mucosa and turbinates normal without polyps  Neck: supple and non-tender without mass, no thyromegaly or thyroid nodules, no cervical lymphadenopathy  Pulmonary/Chest: clear to auscultation bilaterally- no wheezes, rales or rhonchi, normal air movement, no respiratory distress  Cardiovascular: normal rate, regular rhythm, normal S1 and S2, no murmurs, rubs, clicks, or gallops, distal pulses intact, no carotid bruits  Abdomen: soft, non-tender, non-distended, normal bowel sounds, no masses or organomegaly  Extremities: no cyanosis, clubbing or edema  Musculoskeletal: normal range of motion, no joint swelling, deformity or tenderness  Neurologic: reflexes normal and symmetric, no cranial nerve deficit, gait, coordination and speech normal       Allergies   Allergen Reactions    Avodart [Dutasteride] Other (See Comments)     Chest tightness    Myrbetriq [Mirabegron] Other (See Comments)     Red spots     Prior to Visit Medications    Medication Sig Taking?  Authorizing Provider   trospium (SANCTURA) 20 MG tablet Take 1 tablet by mouth daily Yes Kiet Fang MD   atorvastatin (LIPITOR) 40 MG tablet Take 1 tablet by mouth nightly Yes Kiet Fang MD   dicyclomine (BENTYL) 20 MG tablet Take 1 tablet by mouth 2 times daily Yes Sobia Clemens MD   metoprolol tartrate (LOPRESSOR) 25 MG tablet Take 1 tablet by mouth daily Yes Sobia Clemens MD   hyoscyamine (NULEV) 125 MCG TBDP dispersible tablet Take 1 tablet by mouth every 4 hours as needed (abd pain) Yes Sobia Clemens MD   tamsulosin (FLOMAX) 0.4 MG capsule Take 2 capsules by mouth nightly Yes Sobia Clemens MD   omeprazole (PRILOSEC) 20 MG delayed release capsule Take one tablet by mouth daily Yes Sobia Clemens MD   lisinopril (PRINIVIL;ZESTRIL) 10 MG tablet Take 1 tablet by mouth daily Yes Sobia Clemens MD   Calcium Polycarbophil (FIBER-CAPS PO) Take 2 tablets by mouth daily Yes Historical Provider, MD   NONFORMULARY as needed Diltiazem 2% gel apply to rectal fissure daily as directed Yes Historical Provider, MD   aspirin 81 MG EC tablet Take 81 mg by mouth daily Yes Historical Provider, MD   Multiple Vitamins-Minerals (PRESERVISION AREDS) TABS Take by mouth 2 times daily  Yes Historical Provider, MD   UNABLE TO FIND Apple cider vinegar 625mg daily Yes Historical Provider, MD   NONFORMULARY Take 450 mg by mouth 2 times daily Yes Historical Provider, MD   Cinnamon 500 MG CAPS Take 500 mg by mouth Daily Yes Historical Provider, MD   Milk Thistle 1000 MG CAPS Take 1,000 mg by mouth Daily Yes Historical Provider, MD   Ubiquinol 100 MG CAPS Take 1 tablet by mouth daily. Yes Historical Provider, MD   Bilberry 100 MG CAPS Take 100 mg by mouth Daily  Yes Historical Provider, MD   fish oil-omega-3 fatty acids 1000 MG capsule Take 1 g by mouth daily. Yes Historical Provider, MD   Chromium Picolinate 500 MCG TABS Take 500 mg by mouth daily. Yes Historical Provider, MD   Vitamin E 400 UNIT TABS Take 400 mg by mouth daily. Yes Historical Provider, MD   Ascorbic Acid (VITAMIN C CR) 500 MG TBCR Take 500 mg by mouth daily. Yes Historical Provider, MD   Methylsulfonylmethane (MSM) 1500 MG TABS Take 1,500 mg by mouth daily.    Yes Historical Provider, MD   Lycopene 10 MG CAPS Take by mouth daily 2 cap Yes Historical Provider, MD   Multiple Vitamin (MULTIVITAMIN PO) Take 1 tablet by mouth daily. Yes Historical Provider, MD   Cholecalciferol (VITAMIN D3) 2000 UNIT TABS Take 2,000 mg by mouth daily. Yes Historical Provider, MD   Pyridoxine HCl (VITAMIN B-6) 50 MG tablet Take 50 mg by mouth 2 times daily. Yes Historical Provider, MD   Vitamins-Lipotropics (BALANCED B-50 COMPLEX PO) Take  by mouth 2 times daily. Yes Historical Provider, MD   nitroGLYCERIN (NITROSTAT) 0.4 MG SL tablet Place 1 tablet under the tongue every 5 minutes as needed for Chest pain  Patient not taking: Reported on 6/9/2022  Paz Rosa MD       [facility] (Including outside providers/suppliers regularly involved in providing care):   Patient Care Team:  Paz Rosa MD as PCP - General (Family Medicine)  Paz Rosa MD as PCP - [facility] Empaneled Provider  Mary Lloyd MD as Consulting Physician (Urology)  Carola Lim MD as Consulting Physician (Cardiology)     Reviewed and updated this visit:  Tobacco  Allergies  Meds  Problems  Med Hx  Surg Hx  Soc Hx  Fam Hx            Seen today for wellness visit. Discussed the importance of a healthy life style. Balanced diet, nutrition, physical activity,and injury prevention. Also discussed the importance of up to date immunizations and annual screenings.

## 2022-06-12 NOTE — PATIENT INSTRUCTIONS
Personalized Preventive Plan for Paola Garcia - 6/9/2022  Medicare offers a range of preventive health benefits. Some of the tests and screenings are paid in full while other may be subject to a deductible, co-insurance, and/or copay. Some of these benefits include a comprehensive review of your medical history including lifestyle, illnesses that may run in your family, and various assessments and screenings as appropriate. After reviewing your medical record and screening and assessments performed today your provider may have ordered immunizations, labs, imaging, and/or referrals for you. A list of these orders (if applicable) as well as your Preventive Care list are included within your After Visit Summary for your review. Other Preventive Recommendations:    · A preventive eye exam performed by an eye specialist is recommended every 1-2 years to screen for glaucoma; cataracts, macular degeneration, and other eye disorders. · A preventive dental visit is recommended every 6 months. · Try to get at least 150 minutes of exercise per week or 10,000 steps per day on a pedometer . · Order or download the FREE \"Exercise & Physical Activity: Your Everyday Guide\" from [employer]. Call 8-176.769.8392 or search [employer] online. · You need 0561-1861 mg of calcium and 3199-4201 IU of vitamin D per day. It is possible to meet your calcium requirement with diet alone, but a vitamin D supplement is usually necessary to meet this goal.  · When exposed to the sun, use a sunscreen that protects against both UVA and UVB radiation with an SPF of 30 or greater. Reapply every 2 to 3 hours or after sweating, drying off with a towel, or swimming. · Always wear a seat belt when traveling in a car. Always wear a helmet when riding a bicycle or motorcycle.

## 2022-07-11 ENCOUNTER — OFFICE VISIT (OUTPATIENT)
Dept: UROLOGY | Age: 77
End: 2022-07-11
Payer: MEDICARE

## 2022-07-11 VITALS
BODY MASS INDEX: 33.46 KG/M2 | DIASTOLIC BLOOD PRESSURE: 72 MMHG | SYSTOLIC BLOOD PRESSURE: 146 MMHG | HEIGHT: 72 IN | WEIGHT: 247 LBS

## 2022-07-11 DIAGNOSIS — N13.8 BENIGN PROSTATIC HYPERPLASIA WITH URINARY OBSTRUCTION: ICD-10-CM

## 2022-07-11 DIAGNOSIS — N40.1 BENIGN PROSTATIC HYPERPLASIA WITH URINARY OBSTRUCTION: ICD-10-CM

## 2022-07-11 DIAGNOSIS — C67.8 MALIGNANT NEOPLASM OF OVERLAPPING SITES OF BLADDER (HCC): Primary | ICD-10-CM

## 2022-07-11 LAB
BILIRUBIN URINE: NEGATIVE
BLOOD URINE, POC: NEGATIVE
CHARACTER, URINE: CLEAR
COLOR, URINE: YELLOW
GLUCOSE URINE: NEGATIVE MG/DL
KETONES, URINE: NEGATIVE
LEUKOCYTE CLUMPS, URINE: ABNORMAL
NITRITE, URINE: NEGATIVE
PH, URINE: 6 (ref 5–9)
PROTEIN, URINE: NEGATIVE MG/DL
SPECIFIC GRAVITY, URINE: 1.02 (ref 1–1.03)
UROBILINOGEN, URINE: 0.2 EU/DL (ref 0–1)

## 2022-07-11 PROCEDURE — 1036F TOBACCO NON-USER: CPT | Performed by: UROLOGY

## 2022-07-11 PROCEDURE — G8417 CALC BMI ABV UP PARAM F/U: HCPCS | Performed by: UROLOGY

## 2022-07-11 PROCEDURE — G8428 CUR MEDS NOT DOCUMENT: HCPCS | Performed by: UROLOGY

## 2022-07-11 PROCEDURE — 1123F ACP DISCUSS/DSCN MKR DOCD: CPT | Performed by: UROLOGY

## 2022-07-11 PROCEDURE — 81003 URINALYSIS AUTO W/O SCOPE: CPT | Performed by: UROLOGY

## 2022-07-11 PROCEDURE — 99214 OFFICE O/P EST MOD 30 MIN: CPT | Performed by: UROLOGY

## 2022-07-11 NOTE — PROGRESS NOTES
Dr. Emily Vallecillo MD MD  [facility] Consultation / New Patient Visit    Patient:  Klaudia Medrano  YOB: 1945  Date: 7/11/2022  Consult requested from Heather Arisa MD     HISTORY OF PRESENT ILLNESS:   The patient is a 67 y.o. male who presents today for follow-up for the following problem(s): history of bladder cancer, BPH with LUTs  Overall the problem(s) : are worsening. Associated Symptoms: No dysuria, gross hematuria. Pain Severity:      Today visit:   7/11/22    Flomax (0.8 mg) & Sanctura (20 mg BID)  Sp Cysto bladder biopsy  Bladder, biopsy:             Benign urothelial mucosa with von Brunn's nests.             Negative for malignancy. Summary of old records:   (Patient's old records, notes and chart reviewed and summarized above.)    Cystoscopy Operative Note  Surgeon: Emily Vallecillo MD   Anesthesia: Urethral 2%  Indications: bladder cancer  Position: supine  Findings:   The patient was prepped and draped in the usual sterile fashion. The flexible cystoscope was advanced through the urethra and into the bladder. The bladder was thoroughly inspected and the following was noted:    Summary of old records:   (Patient's old records, notes and chart reviewed and summarized above.)    Residual Urine:  Significant  Urethra: No abnormalities of the urethra are noted. Prostate: Large gland Complete obstruction by lateral & small median lobe of prostate. Bladder: No tumors or CIS noted. No bladder diverticulum. Severe trabeculation noted. Ureters: Clear efflux from both ureters. Orifices with normal configuration and location. The cystoscope was removed. The patient tolerated the procedure well.   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 He is on Flomax and [employer] and [employer] with respectable improvement although not completely satisfied. He feels he does not empty his bladder completely. He has nocturia x2.  UA today: WNL  PVR today: 105 mL. Last several PSA's:  Lab Results   Component Value Date    PSA 0.8 05/19/2016    PSA 0.7 10/06/2015    PSA 0.8 12/16/2014       Last total testosterone:  No results found for: TESTOSTERONE    Urinalysis today:  No results found for this visit on 07/11/22. Last BUN and creatinine:  Lab Results   Component Value Date    BUN 19 12/01/2021     Lab Results   Component Value Date    CREATININE 1.00 12/01/2021       Imaging Reviewed during this Office Visit:   (results were independently reviewed by physician and radiology report verified)    PAST MEDICAL, FAMILY AND SOCIAL HISTORY:  Past Medical History:   Diagnosis Date    Arthritis     CAD (coronary artery disease)     Chronic obstructive pulmonary disease ([address] 9/17/2019    GERD (gastroesophageal reflux disease)     Hyperglycemia 3/18/2016    Hyperlipidemia     Hypertension     IBS (irritable bowel syndrome)     Kidney stones     Malignant neoplasm of trigone of urinary bladder ([address]     Obstructive sleep apnea     wears cpap    Prostatitis      Past Surgical History:   Procedure Laterality Date    ABDOMEN SURGERY      BACK SURGERY  07/08/2013    Lumbar Coyle L2-S-1, revision, PLIF L5-S-1 w/ grafting    CARDIAC CATHETERIZATION  9/2007    COLON SURGERY  2008    Dr. Clare Joiner  2011?     Dr. Whitney Westfall  2007    CYSTOSCOPY N/A 2/28/2020    CYSTOSCOPY TRANSURETHRAL RESECTION BLADDER TUMOR performed by Zohaib Lima MD at [facility] 10/4/2021    CYSTOSCOPY, BLADDER BIOPSY WITH FULGURATION performed by Sharon Gaming MD at [facility], ESOPHAGUS      ENDOSCOPY, Philipp Ryan  2005    Dr. Imtiaz Viramontes  HIP ARTHROPLASTY Right 3/25/2014    right total hip    OTHER SURGICAL HISTORY  2016    TURBT by Dr Maisha Mcmillan     Family History   Problem Relation Age of Onset    Heart Disease Mother     Diabetes Mother     Kidney Disease Father     Cancer Brother         colon     No outpatient medications have been marked as taking for the 22 encounter (Office Visit) with Irais Long MD.       Avodart [dutasteride] and Myrbetriq [mirabegron]  Social History     Tobacco Use   Smoking Status Former Smoker    Packs/day: 1.00    Years: 25.00    Pack years: 25.00    Quit date: 1995    Years since quittin.5   Smokeless Tobacco Former User       Social History     Substance and Sexual Activity   Alcohol Use Yes    Alcohol/week: 1.0 standard drink    Types: 1 Cans of beer per week    Comment: seldom       REVIEW OF SYSTEMS:  Constitutional: negative  Eyes: negative  Respiratory: negative  Cardiovascular: negative  Gastrointestinal: negative  Musculoskeletal: negative  Genitourinary: negative  Skin: negative   Neurological: negative  Hematological/Lymphatic: negative  Psychological: negative    Physical Exam:    This a 67 y.o. male   There were no vitals filed for this visit. Constitutional: Patient in no acute distress   Neuro: alert and oriented to person place and time. Psych: Mood and affect normal.  Head: atraumatic normocephalic  Eyes: EOMi  HEENT: neck supple, trachea midline  Lungs: Respiratory effort normal  Cardiovascular:  Normal peripheral pulses  Abdomen: Soft, non-tender, non-distended, No CVA  Bladder: non-tender and not distended. FROMx4, no cyanosis clubbing edema  Skin: warm and dry      Assessment and Plan      1. Malignant neoplasm of overlapping sites of bladder ([address]    2. Benign prostatic hyperplasia with urinary obstruction           Plan:      No follow-ups on file.   Cysto bladder biopsy with fulguration - negative, usman black - will monitor    Will schedule greenlight PVP for BPH with LUTs

## 2022-07-12 ENCOUNTER — TELEPHONE (OUTPATIENT)
Dept: UROLOGY | Age: 77
End: 2022-07-12

## 2022-07-12 DIAGNOSIS — N13.8 BENIGN PROSTATIC HYPERPLASIA WITH URINARY OBSTRUCTION: Primary | ICD-10-CM

## 2022-07-12 DIAGNOSIS — R39.9 LOWER URINARY TRACT SYMPTOMS: ICD-10-CM

## 2022-07-12 DIAGNOSIS — N40.1 BENIGN PROSTATIC HYPERPLASIA WITH URINARY OBSTRUCTION: Primary | ICD-10-CM

## 2022-07-12 DIAGNOSIS — Z01.818 PRE-OP TESTING: ICD-10-CM

## 2022-07-12 NOTE — TELEPHONE ENCOUNTER
Patient scheduled with Dr. Lora Beal on 8/26/22. Surgery consent to be done upon arrival.  Dr. Maxwell Ates to clear. Patient will have an adult over the age of 25 with them at discharge and 24 hours after procedure. Patient to do urine culture on 8/12/22. Surgery instructions/order mailed to patient.     Clint notified; [facility]#[id number]

## 2022-07-12 NOTE — TELEPHONE ENCOUNTER
DO NOT TAKE ASPIRIN,  FISH OIL, COUMADIN, IBUPROFEN, MOTRIN-LIKE DRUGS AND ANY MULTIVITAMINS OR OVER THE COUNTER SUPPLEMENTS 14 DAYS PRIOR TO SURGERY. MUST HAVE AN ADULT OVER THE AGE OF 18 WITH YOU AT THE TIME OF THE PROCEDURE AND WITH YOU AT HOME AFTER THE PROCEDURE FOR 24 HOURS       Wolf Finn 1945 Diagnosis:     Surgical Physician: Dr. Ingrid Armendariz have been scheduled for the procedure marked below:      Surgery: Cystoscopy, Greenlight Photo Vaporization of Prostate         Date: 8/26/2022     Anesthesia: Anesthesiologist (General/Spinal)     Place of Service: [facility] Second Floor Same Day Surgery         Arrive to same day surgery by:  11:15am  (Surgery time is subject to change)      INSTRUCTIONS AS MARKED BELOW:    1.  DO NOT eat or drink anything after midnight before surgery. 2.  We prefer you shower or bathe with an antibacterial soap (Dial) the morning of surgery. 3  Please bring a current medication list, photo ID and insurance card(s) with you  4. Okay to take Tylenol  5. If you take Glucophage, Metformin or Janumet, hold 48-hours prior to surgery  6. Take blood pressure or heart medication as directed, if taken in the morning take with a small sip of water  7. The office will call you in 1-2 days after your procedure to schedule a follow up. DATE SENSITIVE TESTING-DO ON THE DATE LISTED*WALK IN *NO APPOINTMENT    DO URINE CULTURE ON 8/12/2022. ORDERS INCLUDED.         Date: 7/12/2022

## 2022-07-12 NOTE — TELEPHONE ENCOUNTER
CARDIAC CLEARANCE FORM    Clearance From  Dr. Nazia Hall     Appointment Date    Time       Li Feliz  1945  Surgeon:  Dr. Tatiana Zhang    Procedure:  Cystoscopy, Greenlight Photo Vaporization of Prostate  Date:  8/26/22  Facility: [address] [place] [address]  DM CAD PVD CVA DVT/PE MI CHFMalignant Hyperthemia HTN Tobacco/ETOH Sleep Apnea GERD Hyperlipidemia Renal Insufficiency COPD/Asthma Bleeding Disorder Pacemaker/AICD  II. CURRENT MEDICATIONS: Attach list or complete     Pt is on following meds that need special instructions for surgery:  Anticoagulants Heart Meds ASA Insulin Oral anti-diabetics NSAIDS Diuretics     K replacements  III. ALLERGIES:   IV.  FUNCTIONAL CAPACITY  >4 METS (CAN VACUUM/HOUSEWORK,CLIMB FLIGHT STAIRS WITHOUT DYSPNEA)  <4METS (FLIGHT OF STEPS CAUSES DYSPNEA/CARDIAC SYMPTOMS)   Stress Test Recommended:    Stress tests or Cardiac Cath in last 5 years:  Yes (attach report)  No   Results: WNL   ABN  Any Change in Cardiac symptoms: Yes  NO  Comments:    Revascularization in last 5 years: Yes  NO  CABG: Yes  No   Comments:     Stents:  Date: ________  Any change in cardiac symptoms  Yes  NO  Comments:   V.  REVIEW OF SYSTEMS:  (Pertinent positive or negative)    VI. PHYSICIAL EXAM  HEENT:1.  Dentations   Good Poor          HT:_______ WT:______      2. Neck Pathology: Rheumatoid DDD C-spine  BP:______ P:________  PULMONARY:  CARDIAC  ABDOMEN  EXTREMITIES  OTHER  VII.   Testing Ordered by Surgeon  Reviewed by Clearance Physician  Test      Result  Plan,if Abnormal  ___CBS     WNL  ABN____________________  ___BMP/BUN/CR    WNL  ABN____________________  ___K+      WNL  ABN____________________  ___UA      WNL  ABN____________________  ___CXR     WNL  ABN____________________  ___EKG     WNL  ABN____________________  ___MRSA     WNL  ABN____________________  VIII.  ___Acceptable risk for surgery ___Risk Unacceptable-Communication to Follow Comments:_____________________________________________________  ________________________________________________________________________  Physician ___________________________  Date:_______________  Physician Printed Name:  _________________________    Fax  [phone]

## 2022-07-12 NOTE — TELEPHONE ENCOUNTER
SURGERY [address]  [address] [place] [address] [address] [facility] [address]RIE [address]URTZ [address] [place] [address], [address]      Phone *[phone] *[phone]   Surgical Scheduling Direct Line Phone *[phone] Fax *[phone]      Ankur [place] 1945 male    [address] [address] Xochitl Munoz   Marital Status:          Home Phone: [phone]      Cell Phone:    Telephone Information:   Mobile [phone]          Surgeon: Dr. Robin Arteaga Surgery Date: 8/26/2022   Time: 1:15pm    Procedure: Cystoscopy, Greenlight Photo Vaporization of Prostate    Diagnosis: BPH with obstruction    Important Medical History:  In [facility]    Special Inst/Equip: ERIC QUIROGA#[id number] leanna Wade    CPT Codes:    76803  Latex Allergy: No     Cardiac Device:  No    Anesthesia:  General          Admission Type:  Same Day                        Admit Prior to Day of Surgery: No    Case Location:  Main OR            Preadmission Testing:  Phone Call          PAT Date and Time:______________________________________________________    PAT Confirmation #: ______________________________________________________    Post Op Visit: ___________________________________________________________    Need Preop Cardiac Clearance: Yes    Does Patient have Cardiologist/physician?      Dr. Terry Media Confirmation #: __________________________________________________    Alanis Clubs: ________________________   Date: __________________________     [employer] Name: [facility]

## 2022-07-19 ENCOUNTER — PROCEDURE VISIT (OUTPATIENT)
Dept: CARDIOLOGY CLINIC | Age: 77
End: 2022-07-19
Payer: MEDICARE

## 2022-07-19 VITALS
WEIGHT: 247 LBS | BODY MASS INDEX: 33.46 KG/M2 | HEART RATE: 55 BPM | HEIGHT: 72 IN | DIASTOLIC BLOOD PRESSURE: 78 MMHG | SYSTOLIC BLOOD PRESSURE: 122 MMHG

## 2022-07-19 DIAGNOSIS — I25.10 CORONARY ARTERY DISEASE, UNSPECIFIED VESSEL OR LESION TYPE, UNSPECIFIED WHETHER ANGINA PRESENT, UNSPECIFIED WHETHER NATIVE OR TRANSPLANTED HEART: ICD-10-CM

## 2022-07-19 DIAGNOSIS — Z01.818 PRE-OP EVALUATION: Primary | ICD-10-CM

## 2022-07-19 PROCEDURE — 93015 CV STRESS TEST SUPVJ I&R: CPT | Performed by: INTERNAL MEDICINE

## 2022-07-19 PROCEDURE — 78452 HT MUSCLE IMAGE SPECT MULT: CPT | Performed by: INTERNAL MEDICINE

## 2022-07-19 PROCEDURE — A9500 TC99M SESTAMIBI: HCPCS | Performed by: INTERNAL MEDICINE

## 2022-07-19 RX ADMIN — Medication 8.7 MILLICURIE: at 10:57

## 2022-07-19 RX ADMIN — Medication 27.1 MILLICURIE: at 14:15

## 2022-07-19 NOTE — PROGRESS NOTES
[address]  [place] [address]  Dept: [address]: [phone]  7/19/2022  No ref. provider found     Chavez Mota   1945  [id number]      TREADMILL CARDIOLITE STRESS TEST    PROCEDURES:    Placement of intravenous access in the RAC by nuclear tech. Stress test bytreadmill Nigel protocol. Cardiolite single photon emission computed tomography (SPECT) perfusion imaging performed with a dual head Siemens ECAM imaging system. Stress imaging performed 20 minutes following isotope injection for 32 views per detector for a total of 64 images. Gated SPECT imaging performed using a 3 lead cardiac trigger monitor for determination of ejection fraction and wall motion abnormalities. Injection of Cardiolite through venous access in the RAC at both rest/stress. Physician will review the images in the 3 axes (SA, VLA, and HLA) at rest and stress and calculate the ejection fraction and quantify other parameters of heart function based on distribution of isotope    ISOTOPE    Stress Dose: 8.7 mCi Tc-99 Cardiolite IV injection  Resting Dose: 27.1 mCi Tc-99 Cardiolite IV injection.     Raimundo Miles 7/19/2022 2:31 PM EDT

## 2022-07-21 NOTE — PROGRESS NOTES
[address]  [place] [address]  Dept: [address]: [phone]  7/21/2022  No ref. provider found     Augustina Dave   1945  [id number]      Walking Cardiolite stress test     HISTORY AND INDICATION:    Patient is 79years old gentleman with abnormal EKG to have a noncardiac surgery referred for a stress test    Patient had history of hypertension hyper cholesteremia. His EKG showed sinus rhythm right bundle branch block      STRESS PROTOCOL:        This is a patient exercised according to a standard Nigel protocol for 4-1/2 minutes with a maximum workload attained about 5 and half METS heart rate was up from  with rapid decrease of the heart rate and recovery. Achieved 85% predicted heart rate clinically patient has tiredness shortness of breath. His blood pressure 122/78 increased to 152/70. He continues to be in sinus rhythm no arrhythmia EKG changes are equivocal abnormal baseline EKG Cardiolite perfusion scan however showed no major ischemia. He has mild lateral wall ischemia versus artifact. Gated SPECT scan showed a ejection fraction about 55% no gross wall motion abnormalities seen    Pression #1 moderate exercise tolerance with the patient achieving target heart rate and rapid decrease of the heart rate in the recovery. 2.  Normal response blood pressure exercise #3 patient has tiredness and shortness of breath #4 no arrhythmia #5 minimal lateral wall ischemia    6.   Ejection fraction about 55% no gross wall motion abnormalities seen    From the cardiac standpoint the patient can go ahead with his planned surgery he is at low risk for acute cardiac event patient will be seen in the office periodically seek medical attention if he has any change in clinical condition thank

## 2022-07-28 ENCOUNTER — TELEPHONE (OUTPATIENT)
Dept: UROLOGY | Age: 77
End: 2022-07-28

## 2022-08-04 ENCOUNTER — PREP FOR PROCEDURE (OUTPATIENT)
Dept: UROLOGY | Age: 77
End: 2022-08-04

## 2022-08-04 RX ORDER — SODIUM CHLORIDE 9 MG/ML
INJECTION, SOLUTION INTRAVENOUS CONTINUOUS
Status: CANCELLED | OUTPATIENT
Start: 2022-08-26

## 2022-08-05 RX ORDER — HYOSCYAMINE SULFATE 0.125 MG
0.12 TABLET,DISINTEGRATING ORAL EVERY 4 HOURS PRN
Qty: 360 TABLET | Refills: 1 | Status: SHIPPED | OUTPATIENT
Start: 2022-08-05

## 2022-08-12 ENCOUNTER — HOSPITAL ENCOUNTER (OUTPATIENT)
Age: 77
Discharge: HOME | End: 2022-08-12
Payer: MEDICARE

## 2022-08-12 DIAGNOSIS — N13.8 BENIGN PROSTATIC HYPERPLASIA WITH URINARY OBSTRUCTION: ICD-10-CM

## 2022-08-12 DIAGNOSIS — R39.9 LOWER URINARY TRACT SYMPTOMS: ICD-10-CM

## 2022-08-12 DIAGNOSIS — N40.1 BENIGN PROSTATIC HYPERPLASIA WITH URINARY OBSTRUCTION: ICD-10-CM

## 2022-08-12 DIAGNOSIS — Z01.818 PRE-OP TESTING: ICD-10-CM

## 2022-08-12 LAB
ALBUMIN SERPL-MCNC: 4.3 G/DL (ref 3.5–5.1)
ALP BLD-CCNC: 84 U/L (ref 38–126)
ALT SERPL-CCNC: 19 U/L (ref 11–66)
ANION GAP SERPL CALCULATED.3IONS-SCNC: 11 MEQ/L (ref 8–16)
AST SERPL-CCNC: 22 U/L (ref 5–40)
BASOPHILS # BLD: 0.7 %
BASOPHILS ABSOLUTE: 0 THOU/MM3 (ref 0–0.1)
BILIRUB SERPL-MCNC: 1.6 MG/DL (ref 0.3–1.2)
BILIRUBIN DIRECT: 0.3 MG/DL (ref 0–0.3)
BUN BLDV-MCNC: 21 MG/DL (ref 7–22)
CALCIUM SERPL-MCNC: 9.2 MG/DL (ref 8.5–10.5)
CHLORIDE BLD-SCNC: 103 MEQ/L (ref 98–111)
CHOLESTEROL, TOTAL: 127 MG/DL (ref 100–199)
CO2: 23 MEQ/L (ref 23–33)
CREAT SERPL-MCNC: 0.8 MG/DL (ref 0.4–1.2)
EOSINOPHIL # BLD: 3.1 %
EOSINOPHILS ABSOLUTE: 0.2 THOU/MM3 (ref 0–0.4)
ERYTHROCYTE [DISTWIDTH] IN BLOOD BY AUTOMATED COUNT: 12.9 % (ref 11.5–14.5)
ERYTHROCYTE [DISTWIDTH] IN BLOOD BY AUTOMATED COUNT: 42.7 FL (ref 35–45)
GFR SERPL CREATININE-BSD FRML MDRD: > 90 ML/MIN/1.73M2
GLUCOSE BLD-MCNC: 128 MG/DL (ref 70–108)
HCT VFR BLD CALC: 42.1 % (ref 42–52)
HDLC SERPL-MCNC: 59 MG/DL
HEMOGLOBIN: 14 GM/DL (ref 14–18)
IMMATURE GRANS (ABS): 0.03 THOU/MM3 (ref 0–0.07)
IMMATURE GRANULOCYTES: 0.4 %
LDL CHOLESTEROL CALCULATED: 54 MG/DL
LYMPHOCYTES # BLD: 31.1 %
LYMPHOCYTES ABSOLUTE: 2.2 THOU/MM3 (ref 1–4.8)
MCH RBC QN AUTO: 30.4 PG (ref 26–33)
MCHC RBC AUTO-ENTMCNC: 33.3 GM/DL (ref 32.2–35.5)
MCV RBC AUTO: 91.5 FL (ref 80–94)
MONOCYTES # BLD: 9.5 %
MONOCYTES ABSOLUTE: 0.7 THOU/MM3 (ref 0.4–1.3)
NUCLEATED RED BLOOD CELLS: 0 /100 WBC
PLATELET # BLD: 172 THOU/MM3 (ref 130–400)
PMV BLD AUTO: 10.8 FL (ref 9.4–12.4)
POTASSIUM SERPL-SCNC: 3.9 MEQ/L (ref 3.5–5.2)
RBC # BLD: 4.6 MILL/MM3 (ref 4.7–6.1)
SEG NEUTROPHILS: 55.2 %
SEGMENTED NEUTROPHILS ABSOLUTE COUNT: 3.9 THOU/MM3 (ref 1.8–7.7)
SODIUM BLD-SCNC: 137 MEQ/L (ref 135–145)
TOTAL PROTEIN: 6.9 G/DL (ref 6.1–8)
TRIGL SERPL-MCNC: 69 MG/DL (ref 0–199)
WBC # BLD: 7.1 THOU/MM3 (ref 4.8–10.8)

## 2022-08-12 PROCEDURE — 82248 BILIRUBIN DIRECT: CPT

## 2022-08-12 PROCEDURE — 80053 COMPREHEN METABOLIC PANEL: CPT

## 2022-08-12 PROCEDURE — 87086 URINE CULTURE/COLONY COUNT: CPT

## 2022-08-12 PROCEDURE — 80061 LIPID PANEL: CPT

## 2022-08-12 PROCEDURE — 36415 COLL VENOUS BLD VENIPUNCTURE: CPT

## 2022-08-12 PROCEDURE — 85025 COMPLETE CBC W/AUTO DIFF WBC: CPT

## 2022-08-14 LAB — URINE CULTURE, ROUTINE: NORMAL

## 2022-08-17 ENCOUNTER — OFFICE VISIT (OUTPATIENT)
Dept: CARDIOLOGY CLINIC | Age: 77
End: 2022-08-17
Payer: MEDICARE

## 2022-08-17 VITALS
SYSTOLIC BLOOD PRESSURE: 112 MMHG | HEIGHT: 72 IN | WEIGHT: 232 LBS | BODY MASS INDEX: 31.42 KG/M2 | RESPIRATION RATE: 18 BRPM | HEART RATE: 66 BPM | DIASTOLIC BLOOD PRESSURE: 64 MMHG

## 2022-08-17 DIAGNOSIS — I25.83 CORONARY ARTERY DISEASE DUE TO LIPID RICH PLAQUE: ICD-10-CM

## 2022-08-17 DIAGNOSIS — I10 ESSENTIAL HYPERTENSION, BENIGN: Primary | ICD-10-CM

## 2022-08-17 DIAGNOSIS — I25.10 CORONARY ARTERY DISEASE DUE TO LIPID RICH PLAQUE: ICD-10-CM

## 2022-08-17 PROCEDURE — G8427 DOCREV CUR MEDS BY ELIG CLIN: HCPCS | Performed by: INTERNAL MEDICINE

## 2022-08-17 PROCEDURE — 1036F TOBACCO NON-USER: CPT | Performed by: INTERNAL MEDICINE

## 2022-08-17 PROCEDURE — 99213 OFFICE O/P EST LOW 20 MIN: CPT | Performed by: INTERNAL MEDICINE

## 2022-08-17 PROCEDURE — 1123F ACP DISCUSS/DSCN MKR DOCD: CPT | Performed by: INTERNAL MEDICINE

## 2022-08-17 PROCEDURE — 93000 ELECTROCARDIOGRAM COMPLETE: CPT | Performed by: INTERNAL MEDICINE

## 2022-08-17 PROCEDURE — G8417 CALC BMI ABV UP PARAM F/U: HCPCS | Performed by: INTERNAL MEDICINE

## 2022-08-17 ASSESSMENT — ENCOUNTER SYMPTOMS
NAUSEA: 0
COUGH: 0
STRIDOR: 0
CHEST TIGHTNESS: 0
VOMITING: 0
ABDOMINAL PAIN: 0
VOICE CHANGE: 0
CHOKING: 0
SHORTNESS OF BREATH: 0
BLOOD IN STOOL: 0
WHEEZING: 0
APNEA: 0
ABDOMINAL DISTENTION: 0
ANAL BLEEDING: 0
TROUBLE SWALLOWING: 0
COLOR CHANGE: 0

## 2022-08-17 NOTE — PROGRESS NOTES
[address]  [place] [address]  Dept: [phone]  Loc: [phone]     8/17/2022       Carlene Shelton is here today for   Chief Complaint   Patient presents with    Follow-up           Referring Physician:  No ref. provider found     Patient Active Problem List   Diagnosis    Hyperlipidemia    Essential hypertension, benign    Obesity (BMI 30.0-34.9)    Spinal stenosis of lumbar region with neurogenic claudication    Abnormal nuclear cardiac imaging test    Obstructive sleep apnea on CPAP    Primary localized osteoarthrosis, pelvic region and thigh    Anal fissure    Hyperglycemia    Chest pain at rest    Benign prostatic hyperplasia with incomplete bladder emptying    Bladder tumor    Malignant neoplasm of trigone of urinary bladder (HCC)    Malignant neoplasm of posterior wall of urinary bladder (HCC)    BPH with obstruction/lower urinary tract symptoms    Anxiety    Contusion of right foot    History of bladder cancer       Review of Systems   Constitutional:  Negative for activity change, appetite change, fatigue, fever and unexpected weight change. HENT:  Negative for congestion, trouble swallowing and voice change. Eyes:  Negative for visual disturbance. Respiratory:  Negative for apnea, cough, choking, chest tightness, shortness of breath, wheezing and stridor. Cardiovascular:  Negative for chest pain, palpitations and leg swelling. Gastrointestinal:  Negative for abdominal distention, abdominal pain, anal bleeding, blood in stool, nausea and vomiting. Endocrine: Negative for cold intolerance and heat intolerance. Genitourinary:  Negative for hematuria. Musculoskeletal:  Negative for arthralgias, gait problem, joint swelling and myalgias. Skin:  Negative for color change and rash. Allergic/Immunologic: Negative for environmental allergies and food allergies.    Neurological: Negative for dizziness, tremors, syncope, facial asymmetry, weakness, light-headedness, numbness and headaches. Hematological:  Does not bruise/bleed easily. Psychiatric/Behavioral:  Negative for agitation, behavioral problems and sleep disturbance.        Past Medical History:   Diagnosis Date    Arthritis     CAD (coronary artery disease)     Chronic obstructive pulmonary disease ([facility] [address] 9/17/2019    GERD (gastroesophageal reflux disease)     Hyperglycemia 3/18/2016    Hyperlipidemia     Hypertension     IBS (irritable bowel syndrome)     Kidney stones     Malignant neoplasm of trigone of urinary bladder (HCC)     Obstructive sleep apnea     wears cpap    Prostatitis        Allergies   Allergen Reactions    Avodart [Dutasteride] Other (See Comments)     Chest tightness    Myrbetriq [Mirabegron] Other (See Comments)     Red spots       Current Outpatient Medications   Medication Sig Dispense Refill    hyoscyamine (NULEV) 125 MCG TBDP dispersible tablet Take 1 tablet by mouth every 4 hours as needed (abd pain) 360 tablet 1    trospium (SANCTURA) 20 MG tablet Take 1 tablet by mouth daily (Patient taking differently: Take 20 mg by mouth 2 times daily) 180 tablet 3    atorvastatin (LIPITOR) 40 MG tablet Take 1 tablet by mouth nightly 90 tablet 3    dicyclomine (BENTYL) 20 MG tablet Take 1 tablet by mouth 2 times daily 180 tablet 3    metoprolol tartrate (LOPRESSOR) 25 MG tablet Take 1 tablet by mouth daily 90 tablet 2    tamsulosin (FLOMAX) 0.4 MG capsule Take 2 capsules by mouth nightly 180 capsule 3    omeprazole (PRILOSEC) 20 MG delayed release capsule Take one tablet by mouth daily 90 capsule 3    lisinopril (PRINIVIL;ZESTRIL) 10 MG tablet Take 1 tablet by mouth daily 90 tablet 3    Calcium Polycarbophil (FIBER-CAPS PO) Take 2 tablets by mouth daily      NONFORMULARY as needed Diltiazem 2% gel apply to rectal fissure daily as directed      aspirin 81 MG EC tablet Take 81 mg by mouth daily      Multiple Not Currently     Partners: Female     Social Determinants of Health     Financial Resource Strain: Low Risk     Difficulty of Paying Living Expenses: Not hard at all   Food Insecurity: No Food Insecurity    Worried About Running Out of Food in the Last Year: Never true    Ran Out of Food in the Last Year: Never true   Physical Activity: Inactive    Days of Exercise per Week: 3 days    Minutes of Exercise per Session: 0 min       Family History   Problem Relation Age of Onset    Heart Disease Mother     Diabetes Mother     Kidney Disease Father     Cancer Brother         colon       Blood pressure 112/64, pulse 66, resp. rate 18, height 6' (1.829 m), weight 232 lb (105.2 kg). Physical Exam:    General Appearance: alert and oriented to person, place and time, in no acute distress  Cardiovascular: normal rate, regular rhythm, normal S1 and S2, no murmurs, rubs, clicks, or gallops, distal pulses intact, no carotid bruits, no JVD  Pulmonary/Chest: clear to auscultation bilaterally- no wheezes, rales or rhonchi, normal air movement, no respiratory distress  Abdomen: soft, non-tender, non-distended, normal bowel sounds, no masses   Extremities: no cyanosis, clubbing or edema, pulse   Skin: warm and dry, no rash or erythema  Head: normocephalic and atraumatic  Eyes: pupils equal, round, and reactive to light  Neck: supple and non-tender without mass, no thyromegaly   Musculoskeletal: normal range of motion, no joint swelling, deformity or tenderness  Neurological: alert, oriented, normal speech, no focal findings or movement disorder noted    Lab Data:    Cardiac Enzymes:  No results for input(s): CKTOTAL, CKMB, CKMBINDEX, TROPONINI in the last 72 hours.     CBC:   Lab Results   Component Value Date/Time    WBC 7.1 08/12/2022 09:03 AM    RBC 4.60 08/12/2022 09:03 AM    RBC 4.52 12/01/2021 07:03 AM    HGB 14.0 08/12/2022 09:03 AM    HCT 42.1 08/12/2022 09:03 AM     08/12/2022 09:03 AM       CMP:    Lab Results Component Value Date/Time     08/12/2022 09:03 AM    K 3.9 08/12/2022 09:03 AM     08/12/2022 09:03 AM    CO2 23 08/12/2022 09:03 AM    BUN 21 08/12/2022 09:03 AM    CREATININE 0.8 08/12/2022 09:03 AM    LABGLOM >90 08/12/2022 09:03 AM    GLUCOSE 128 08/12/2022 09:03 AM    GLUCOSE 120 12/01/2021 07:03 AM    CALCIUM 9.2 08/12/2022 09:03 AM       Hepatic Function Panel:    Lab Results   Component Value Date/Time    ALKPHOS 84 08/12/2022 09:03 AM    ALT 19 08/12/2022 09:03 AM    AST 22 08/12/2022 09:03 AM    PROT 6.9 08/12/2022 09:03 AM    BILITOT 1.6 08/12/2022 09:03 AM    BILITOT Negative 03/09/2020 07:06 AM    BILIDIR 0.3 08/12/2022 09:03 AM    LABALBU 4.3 08/12/2022 09:03 AM    LABALBU 4.5 04/03/2012 09:30 AM       Magnesium:  No results found for: MG    PT/INR:    Lab Results   Component Value Date/Time    INR 1.01 02/27/2014 10:04 AM       HgBA1c:    Lab Results   Component Value Date/Time    LABA1C 5.6 12/02/2020 08:19 AM       FLP:    Lab Results   Component Value Date/Time    TRIG 69 08/12/2022 09:03 AM    HDL 59 08/12/2022 09:03 AM    LDLCALC 54 08/12/2022 09:03 AM    LABVLDL 13 12/01/2021 07:03 AM       TSH:    Lab Results   Component Value Date/Time    TSH 0.810 08/03/2017 12:24 PM        Diagnosis Orders   1. Essential hypertension, benign  [address] - [place] ELECTROCARDIOGRAM, COMPLETE    Left heart cath      2. Coronary artery disease due to lipid rich plaque  Left heart cath           Assessment/Plan    There is 67years old gentleman who is known to have history of coronary artery disease he has a history of prior intervention of the RCA back in over 7. The patient to have a noncardiac surgery. The patient has been having dyspnea on exertion chest pain and sweating. History of hypercholesterolemia he did had a stress Cardiolite test which was abnormal.  The patient is here for a follow-up stress test findings were discussed with him and his wife in great details.   I believe the patient could go through his prostate surgery. .  And once the surgery is healed if the patient continues to be symptomatic could be considered for a heart cath possible intervention the patient was advised about diet exercise he has a history of hypercholesterolemia and his cholesterol is 127 his H&H within normal limits his EKG showed sinus rhythm bundle branch block PVCs nonspecific ST segment change the I discussed the findings on the stress test only with the patient and the plan of treatment he is to go ahead with his intended prostate surgery the patient continue current medication seek medical attention for any change or clinical condition thank    Orders Placed This Encounter   Procedures    [address] - [place] ELECTROCARDIOGRAM, COMPLETE       Return in about 3 months (around 11/17/2022) for cad.      Cindy Aquino MD

## 2022-08-22 NOTE — FLOWSHEET NOTE
Follow all instructions given by your physician    NPO after midnight   Sips of water am of surgery with allowed medications  Bring insurance info and 's license  Wear comfortable clean, loose fitting clothing  No jewelry  No glue on dentures morning of surgery;you will be asked to remove them for surgery. Case for glasses. Shower night before and morning of surgery with a liquid antibacterial soap, dry with fresh clean towel; no lotions, creams or powder. Clean sheets and pillow case on bed night before surgery  Bring medications in original bottles  Bring CPAP/BIPAP machine if you have one ( you may be charged if one is needed in recovery room ) pt states not bring    needed at discharge and someone over 18 to stay with you for 24 hours overnight (surgery may be cancelled if you don't have this)  Report to [facility] on 2nd floor  If you would become ill prior to surgery, please call the surgeon  May have a visitor with you, we request that you limit to 2 visitors in pre-op area   Please bring and wear mask  Call [phone] for any questions  Covid questionnaire Complete; Patient negative for symptoms or exposure. See documentation.

## 2022-08-26 ENCOUNTER — ANESTHESIA (OUTPATIENT)
Dept: OPERATING ROOM | Age: 77
End: 2022-08-26
Payer: MEDICARE

## 2022-08-26 ENCOUNTER — ANESTHESIA EVENT (OUTPATIENT)
Dept: OPERATING ROOM | Age: 77
End: 2022-08-26
Payer: MEDICARE

## 2022-08-26 ENCOUNTER — HOSPITAL ENCOUNTER (OUTPATIENT)
Age: 77
Setting detail: OUTPATIENT SURGERY
Discharge: HOME | End: 2022-08-26
Attending: UROLOGY | Admitting: UROLOGY
Payer: MEDICARE

## 2022-08-26 VITALS
HEIGHT: 72 IN | OXYGEN SATURATION: 98 % | BODY MASS INDEX: 31.42 KG/M2 | DIASTOLIC BLOOD PRESSURE: 86 MMHG | SYSTOLIC BLOOD PRESSURE: 156 MMHG | TEMPERATURE: 96.4 F | RESPIRATION RATE: 18 BRPM | WEIGHT: 232 LBS | HEART RATE: 54 BPM

## 2022-08-26 PROCEDURE — 2720000010 HC SURG SUPPLY STERILE: Performed by: UROLOGY

## 2022-08-26 PROCEDURE — 7100000011 HC PHASE II RECOVERY - ADDTL 15 MIN: Performed by: UROLOGY

## 2022-08-26 PROCEDURE — 3600000013 HC SURGERY LEVEL 3 ADDTL 15MIN: Performed by: UROLOGY

## 2022-08-26 PROCEDURE — 6360000002 HC RX W HCPCS: Performed by: UROLOGY

## 2022-08-26 PROCEDURE — 2709999900 HC NON-CHARGEABLE SUPPLY: Performed by: UROLOGY

## 2022-08-26 PROCEDURE — 2500000003 HC RX 250 WO HCPCS: Performed by: NURSE ANESTHETIST, CERTIFIED REGISTERED

## 2022-08-26 PROCEDURE — 7100000000 HC PACU RECOVERY - FIRST 15 MIN: Performed by: UROLOGY

## 2022-08-26 PROCEDURE — 6360000002 HC RX W HCPCS: Performed by: NURSE ANESTHETIST, CERTIFIED REGISTERED

## 2022-08-26 PROCEDURE — 3600000003 HC SURGERY LEVEL 3 BASE: Performed by: UROLOGY

## 2022-08-26 PROCEDURE — 2580000003 HC RX 258: Performed by: UROLOGY

## 2022-08-26 PROCEDURE — 7100000010 HC PHASE II RECOVERY - FIRST 15 MIN: Performed by: UROLOGY

## 2022-08-26 PROCEDURE — 6360000002 HC RX W HCPCS

## 2022-08-26 PROCEDURE — 7100000001 HC PACU RECOVERY - ADDTL 15 MIN: Performed by: UROLOGY

## 2022-08-26 PROCEDURE — 3700000001 HC ADD 15 MINUTES (ANESTHESIA): Performed by: UROLOGY

## 2022-08-26 PROCEDURE — 3700000000 HC ANESTHESIA ATTENDED CARE: Performed by: UROLOGY

## 2022-08-26 RX ORDER — FENTANYL CITRATE 50 UG/ML
INJECTION, SOLUTION INTRAMUSCULAR; INTRAVENOUS PRN
Status: DISCONTINUED | OUTPATIENT
Start: 2022-08-26 | End: 2022-08-26 | Stop reason: SDUPTHER

## 2022-08-26 RX ORDER — ONDANSETRON 2 MG/ML
4 INJECTION INTRAMUSCULAR; INTRAVENOUS
Status: DISCONTINUED | OUTPATIENT
Start: 2022-08-26 | End: 2022-08-26 | Stop reason: HOSPADM

## 2022-08-26 RX ORDER — PROPOFOL 10 MG/ML
INJECTION, EMULSION INTRAVENOUS PRN
Status: DISCONTINUED | OUTPATIENT
Start: 2022-08-26 | End: 2022-08-26 | Stop reason: SDUPTHER

## 2022-08-26 RX ORDER — HYDRALAZINE HYDROCHLORIDE 20 MG/ML
10 INJECTION INTRAMUSCULAR; INTRAVENOUS
Status: DISCONTINUED | OUTPATIENT
Start: 2022-08-26 | End: 2022-08-26 | Stop reason: HOSPADM

## 2022-08-26 RX ORDER — SODIUM CHLORIDE 0.9 % (FLUSH) 0.9 %
5-40 SYRINGE (ML) INJECTION PRN
Status: DISCONTINUED | OUTPATIENT
Start: 2022-08-26 | End: 2022-08-26 | Stop reason: HOSPADM

## 2022-08-26 RX ORDER — CIPROFLOXACIN 500 MG/1
500 TABLET, FILM COATED ORAL 2 TIMES DAILY
Qty: 10 TABLET | Refills: 0 | Status: SHIPPED | OUTPATIENT
Start: 2022-08-26 | End: 2022-08-31

## 2022-08-26 RX ORDER — LABETALOL 20 MG/4 ML (5 MG/ML) INTRAVENOUS SYRINGE
10
Status: DISCONTINUED | OUTPATIENT
Start: 2022-08-26 | End: 2022-08-26 | Stop reason: HOSPADM

## 2022-08-26 RX ORDER — SODIUM CHLORIDE 0.9 % (FLUSH) 0.9 %
5-40 SYRINGE (ML) INJECTION EVERY 12 HOURS SCHEDULED
Status: DISCONTINUED | OUTPATIENT
Start: 2022-08-26 | End: 2022-08-26 | Stop reason: HOSPADM

## 2022-08-26 RX ORDER — SODIUM CHLORIDE 9 MG/ML
INJECTION, SOLUTION INTRAVENOUS PRN
Status: DISCONTINUED | OUTPATIENT
Start: 2022-08-26 | End: 2022-08-26 | Stop reason: HOSPADM

## 2022-08-26 RX ORDER — DEXAMETHASONE SODIUM PHOSPHATE 10 MG/ML
INJECTION, EMULSION INTRAMUSCULAR; INTRAVENOUS PRN
Status: DISCONTINUED | OUTPATIENT
Start: 2022-08-26 | End: 2022-08-26 | Stop reason: SDUPTHER

## 2022-08-26 RX ORDER — ONDANSETRON 2 MG/ML
INJECTION INTRAMUSCULAR; INTRAVENOUS PRN
Status: DISCONTINUED | OUTPATIENT
Start: 2022-08-26 | End: 2022-08-26 | Stop reason: SDUPTHER

## 2022-08-26 RX ORDER — PHENAZOPYRIDINE HYDROCHLORIDE 100 MG/1
100 TABLET, FILM COATED ORAL 3 TIMES DAILY PRN
Qty: 21 TABLET | Refills: 0 | Status: SHIPPED | OUTPATIENT
Start: 2022-08-26 | End: 2022-09-02

## 2022-08-26 RX ORDER — GLYCOPYRROLATE 1 MG/5 ML
SYRINGE (ML) INTRAVENOUS PRN
Status: DISCONTINUED | OUTPATIENT
Start: 2022-08-26 | End: 2022-08-26 | Stop reason: SDUPTHER

## 2022-08-26 RX ORDER — SODIUM CHLORIDE 9 MG/ML
INJECTION, SOLUTION INTRAVENOUS CONTINUOUS
Status: DISCONTINUED | OUTPATIENT
Start: 2022-08-26 | End: 2022-08-26 | Stop reason: HOSPADM

## 2022-08-26 RX ORDER — LIDOCAINE HYDROCHLORIDE 20 MG/ML
INJECTION, SOLUTION EPIDURAL; INFILTRATION; INTRACAUDAL; PERINEURAL PRN
Status: DISCONTINUED | OUTPATIENT
Start: 2022-08-26 | End: 2022-08-26 | Stop reason: SDUPTHER

## 2022-08-26 RX ADMIN — HYDROMORPHONE HYDROCHLORIDE 0.5 MG: 1 INJECTION, SOLUTION INTRAMUSCULAR; INTRAVENOUS; SUBCUTANEOUS at 14:37

## 2022-08-26 RX ADMIN — Medication 0.4 MG: at 13:46

## 2022-08-26 RX ADMIN — FENTANYL CITRATE 50 MCG: 50 INJECTION, SOLUTION INTRAMUSCULAR; INTRAVENOUS at 13:55

## 2022-08-26 RX ADMIN — CEFAZOLIN 2000 MG: 10 INJECTION, POWDER, FOR SOLUTION INTRAVENOUS at 13:37

## 2022-08-26 RX ADMIN — PROPOFOL 150 MG: 10 INJECTION, EMULSION INTRAVENOUS at 13:31

## 2022-08-26 RX ADMIN — ONDANSETRON 4 MG: 2 INJECTION INTRAMUSCULAR; INTRAVENOUS at 13:31

## 2022-08-26 RX ADMIN — Medication 100 MG: at 13:31

## 2022-08-26 RX ADMIN — DEXAMETHASONE SODIUM PHOSPHATE 10 MG: 10 INJECTION, EMULSION INTRAMUSCULAR; INTRAVENOUS at 13:31

## 2022-08-26 RX ADMIN — Medication 0.5 MG: at 14:37

## 2022-08-26 RX ADMIN — Medication 0.4 MG: at 13:29

## 2022-08-26 RX ADMIN — FENTANYL CITRATE 50 MCG: 50 INJECTION, SOLUTION INTRAMUSCULAR; INTRAVENOUS at 13:38

## 2022-08-26 RX ADMIN — SODIUM CHLORIDE: 9 INJECTION, SOLUTION INTRAVENOUS at 11:48

## 2022-08-26 ASSESSMENT — PAIN SCALES - GENERAL
PAINLEVEL_OUTOF10: 0
PAINLEVEL_OUTOF10: 7

## 2022-08-26 ASSESSMENT — PAIN DESCRIPTION - LOCATION: LOCATION: PENIS

## 2022-08-26 ASSESSMENT — PAIN DESCRIPTION - DESCRIPTORS: DESCRIPTORS: BURNING

## 2022-08-26 ASSESSMENT — PAIN - FUNCTIONAL ASSESSMENT: PAIN_FUNCTIONAL_ASSESSMENT: 0-10

## 2022-08-26 ASSESSMENT — PAIN DESCRIPTION - ONSET: ONSET: ON-GOING

## 2022-08-26 ASSESSMENT — PAIN DESCRIPTION - FREQUENCY: FREQUENCY: CONTINUOUS

## 2022-08-26 NOTE — OP NOTE
FACILITY:  [address] [place] [address] [place], [place]  DATE:  08/26/22  Jacobo Car  1945  [id number]     Surgeon: Dr. Kourtney Malone MD MD  Asst.: Dr. Kourtney Malone MD MD  PREOPERATIVE DIAGNOSES:  1. Urinary retention. 2. Benign prostatic hyperplasia with obstruction. POSTOPERATIVE DIAGNOSIS:  1. Urinary retention. 2. Benign prostatic hyperplasia with obstruction. PROCEDURES PERFORMED:  1. Cystoscopy. 2. GreenLight photovaporization of the prostate. ANESTHESIA:  General.  COMPLICATIONS:  None. SPECIMENS:  Urine for culture. ESTIMATED BLOOD LOSS:  Minimal.  DRAINS:  A 22 Upper sorbian 3-way Mohamud catheter. INDICATIONS: Jacobo Car is a 67 y.o. male presents today for GreenLight photovaporization of the prostate. After risks, benefits and alternatives of the procedure were discussed with the patient, informed consent was obtained and the patient elected to proceed. OPERATIVE SUMMARY:  The risks and benefits of the procedure were explained to the patient in the preoperative area. After informed consent was obtained, the patient was taken back to the operating room. The patient was transferred to the operating table and placed in a supine position. General anesthesia was induced and the patient was placed in the dorsal lithotomy position. He was prepped and draped in a sterile fashion and a time-out was performed to confirm patient identity and procedure. Prior to induction of anesthesia the patient was administered preoperative antibiotics and EPC cuffs were on and functioning. Our continuous flow sheath with obturator and lens was inserted through the patient's urethra and into the bladder. Upon entering the bladder we located both ureteral orifices, they were at a safe distance from the vesical neck. On evaluation of the prostate the patient was noted to have small median lobe.  The GreenLight fiber was then inserted after we removed our obturator and placed our working bridge through out He will be discharged home from the hospital per the PACU team. Appropriate follow up will be arranged for catheter removal

## 2022-08-26 NOTE — PROGRESS NOTES
Patient, wife and family member educated on how to change mohan bag to leg bag. Leg bag applied. Patient and family verbalize understanding on how to switch bag. Mohan bag given to patient for home.

## 2022-08-26 NOTE — DISCHARGE INSTRUCTIONS
Discharge instructions: Greenlight Photovaporization of the prostate: The patient should have CBI weaned off in recovery. Please call if urine is not clear / pink with CBI. Traction on the catheter should be released before discharge. Contue flomax for 1 month    You may see blood in the urine after the procedure. This should resolve over the next couple days. Please stay hydrated. You may experience frequency/urgency of urination after the procedure. We expect these symptoms to improve over the next couple weeks. Tylenol for pain control  Pt ok to discharge home in good condition  No heavy lifting, >10 lbs for today  Pt should avoid strenuous activity for today  Pt should walk moderately at home  Pt ok to shower   Pt may resume diet as tolerated  Pt should take Rx as directed  No driving while on narcotics  Please call attending physician or hospital  with questions  Call or Present to ED if fever (> 101F), intractable nausea vomiting or pain. Pt should follow up with Dr. Kourtney Malone MD, call Monday to have catheter removed, call to confirm appointment    Home with mohan catheter. Please teach mohan education and send home with leg and night bag. You may see intermittent blood in the urine while the catheter in place. If the catheter becomes obstructed and needs to be exchanged, please call.

## 2022-08-26 NOTE — H&P
[facility]  Urology H&P Note     Patient:  Fred Dudley  MRN: [id number]  YOB: 1945    ATTENDING: Ander Linares MD     CHIEF COMPLAINT:  BPH    HISTORY OF PRESENT ILLNESS:   The patient is a 67 y.o. male who presents with BPH    Patient's old records, notes and chart reviewed and summarized above. Past Medical History:    Past Medical History:   Diagnosis Date    Arthritis     CAD (coronary artery disease)     Chronic obstructive pulmonary disease ([address] 9/17/2019    GERD (gastroesophageal reflux disease)     Hyperglycemia 3/18/2016    Hyperlipidemia     Hypertension     IBS (irritable bowel syndrome)     Kidney stones     Malignant neoplasm of trigone of urinary bladder (HCC)     Obstructive sleep apnea     wears cpap    Prostatitis        Past Surgical History:    Past Surgical History:   Procedure Laterality Date    ABDOMEN SURGERY      BACK SURGERY  07/08/2013    Lumbar Coyle L2-S-1, revision, PLIF L5-S-1 w/ grafting    CARDIAC CATHETERIZATION  9/2007    COLON SURGERY  2008    Dr. Marge Moss  2011? Dr. William Cosme  2007    CYSTOSCOPY N/A 2/28/2020    CYSTOSCOPY TRANSURETHRAL RESECTION BLADDER TUMOR performed by Ramón Lai MD at . [place] [address] N/A 10/4/2021    CYSTOSCOPY, BLADDER BIOPSY WITH FULGURATION performed by Reinaldo Miles MD at [address] [place] [address], ESOPHAGUS      ENDOSCOPY, Mamta Elliott  2005    Dr. Betsy Bower Right 3/25/2014    right total hip    OTHER SURGICAL HISTORY  08/25/2016    TURBT by Dr Kasi Loja       Medications Prior to Admission:   Prior to Admission medications    Medication Sig Start Date End Date Taking?  Authorizing Provider   hyoscyamine (NULEV) 125 MCG TBDP dispersible tablet Take 1 tablet by mouth every 4 hours as needed (abd pain) 8/5/22   Wendy Tafoya MD   trospium Picolinate 500 MCG TABS Take 500 mg by mouth daily. Historical Provider, MD   Vitamin E 400 UNIT TABS Take 400 mg by mouth daily. Historical Provider, MD   Ascorbic Acid  MG TBCR Take 500 mg by mouth daily. Historical Provider, MD   Methylsulfonylmethane (MSM) 1500 MG TABS Take 1,500 mg by mouth daily. Historical Provider, MD   Lycopene 10 MG CAPS Take by mouth daily 2 cap    Historical Provider, MD   Multiple Vitamin (MULTIVITAMIN PO) Take 1 tablet by mouth daily. Historical Provider, MD   Cholecalciferol (VITAMIN D3) 2000 UNIT TABS Take 2,000 mg by mouth daily. Historical Provider, MD   Pyridoxine HCl (VITAMIN B-6) 50 MG tablet Take 50 mg by mouth 2 times daily. Historical Provider, MD   Vitamins-Lipotropics (BALANCED B-50 COMPLEX PO) Take  by mouth 2 times daily. Historical Provider, MD       Allergies:  Avodart [dutasteride] and Myrbetriq [mirabegron]    Social History:    Social History     Socioeconomic History    Marital status:      Spouse name: Not on file    Number of children: Not on file    Years of education: Not on file    Highest education level: Not on file   Occupational History     Employer: [address] [place] [address]   Tobacco Use    Smoking status: Former     Packs/day: 1.00     Years: 25.00     Pack years: 25.00     Types: Cigarettes     Quit date: 1995     Years since quittin.6    Smokeless tobacco: Former   Vaping Use    Vaping Use: Not on file   Substance and Sexual Activity    Alcohol use:  Yes     Alcohol/week: 1.0 standard drink     Types: 1 Cans of beer per week     Comment: seldom    Drug use: No    Sexual activity: Not Currently     Partners: Female   Other Topics Concern    Not on file   Social History Narrative    Not on file     Social Determinants of Health     Financial Resource Strain: Low Risk     Difficulty of Paying Living Expenses: Not hard at all   Food Insecurity: No Food Insecurity    Worried About Running Out of Food in the Last Year: Never true    Ran Out of Food in the Last Year: Never true   Transportation Needs: Not on file   Physical Activity: Inactive    Days of Exercise per Week: 3 days    Minutes of Exercise per Session: 0 min   Stress: Not on file   Social Connections: Not on file   Intimate Partner Violence: Not on file   Housing Stability: Not on file       Family History:    Family History   Problem Relation Age of Onset    Heart Disease Mother     Diabetes Mother     Kidney Disease Father     Cancer Brother         colon       REVIEW OF SYSTEMS:  All systems reviewed and negative except for that already noted in the HPI. Physical Exam:      Patient Vitals for the past 24 hrs:   BP Temp Temp src Pulse Resp SpO2 Height Weight   08/26/22 1110 -- -- -- -- -- -- 6' (1.829 m) 232 lb (105.2 kg)   08/26/22 1106 (!) 145/70 (!) 96 °F (35.6 °C) Temporal 57 20 97 % -- --     Constitutional: Patient in no acute distress; Neuro: alert and oriented to person place and time. Psych: Mood and affect normal.  Skin: Normal  Lungs: Respiratory effort normal  Cardiovascular:  Normal peripheral pulses  Abdomen: Soft, non-tender, non-distended with no CVA, flank pain, hepatosplenomegaly or hernia. Kidneys normal.  Bladder non-tender and not distended.   Lymphatics: no palpable lymphadenopathy        Assessment and Plan   Impression:    Patient Active Problem List   Diagnosis    Hyperlipidemia    Essential hypertension, benign    Obesity (BMI 30.0-34.9)    Spinal stenosis of lumbar region with neurogenic claudication    Abnormal nuclear cardiac imaging test    Obstructive sleep apnea on CPAP    Primary localized osteoarthrosis, pelvic region and thigh    Anal fissure    Hyperglycemia    Chest pain at rest    Benign prostatic hyperplasia with incomplete bladder emptying    Bladder tumor    Malignant neoplasm of trigone of urinary bladder (HCC)    Malignant neoplasm of posterior wall of urinary bladder (HCC)    BPH with obstruction/lower urinary tract symptoms    Anxiety    Contusion of right foot    History of bladder cancer       Plan: Greenlight PVP  Risks benefits and alternative procedures are explained, informed consent is obtained, and the patient elects to proceed.

## 2022-08-26 NOTE — PROGRESS NOTES
Pt returned to [facility] room 7. Vitals and assessment as charted. 0.9 infusing, @300ml to count from PACU. Pt has muffin, crackers, and ginger ale. Family at the bedside. Pt and family verbalized understanding of discharge criteria and call light use. Call light in reach.

## 2022-08-26 NOTE — ANESTHESIA PRE PROCEDURE
Department of Anesthesiology  Preprocedure Note       Name:  Wolf Finn   Age:  67 y.o.  :  1945                                          MRN:  [id number]         Date:  2022      Surgeon: Mindy Fleming):  Jelani Arambula MD    Procedure: Rip Plan WITH GREENLIGHT PHOTOVAPORIZATION OF THE PROSTATE    Medications prior to admission:   Prior to Admission medications    Medication Sig Start Date End Date Taking?  Authorizing Provider   hyoscyamine (NULEV) 125 MCG TBDP dispersible tablet Take 1 tablet by mouth every 4 hours as needed (abd pain) 22   Balbina Bhakta MD   trospium (SANCTURA) 20 MG tablet Take 1 tablet by mouth daily  Patient taking differently: Take 20 mg by mouth at bedtime 22   Balbina Bhakta MD   atorvastatin (LIPITOR) 40 MG tablet Take 1 tablet by mouth nightly 22   Balbina Bhakta MD   dicyclomine (BENTYL) 20 MG tablet Take 1 tablet by mouth 2 times daily 22   Balbina Bhakta MD   metoprolol tartrate (LOPRESSOR) 25 MG tablet Take 1 tablet by mouth daily 22   Balbina Bhakta MD   tamsulosin [facility]) 0.4 MG capsule Take 2 capsules by mouth nightly 21  Balbina Bhakta MD   omeprazole (PRILOSEC) 20 MG delayed release capsule Take one tablet by mouth daily  Patient not taking: Reported on 2022   Balbina Bhakta MD   lisinopril (PRINIVIL;ZESTRIL) 10 MG tablet Take 1 tablet by mouth daily 21   Balbina Bhakta MD   Calcium Polycarbophil (FIBER-CAPS PO) Take 2 tablets by mouth daily    Historical Provider, MD   NONFORMULARY as needed Diltiazem 2% gel apply to rectal fissure daily as directed    Historical Provider, MD   aspirin 81 MG EC tablet Take 81 mg by mouth daily    Historical Provider, MD   Multiple Vitamins-Minerals (PRESERVISION AREDS) TABS Take by mouth 2 times daily     Historical Provider, MD   UNABLE TO FIND Apple cider vinegar 625mg daily    Historical Provider, MD   NONFORMULARY Take 450 mg by mouth 2 times daily Historical Provider, MD   nitroGLYCERIN (NITROSTAT) 0.4 MG SL tablet Place 1 tablet under the tongue every 5 minutes as needed for Chest pain 6/13/17   Elliott Jaramillo MD   Cinnamon 500 MG CAPS Take 500 mg by mouth Daily    Historical Provider, MD   Milk Thistle 1000 MG CAPS Take 1,000 mg by mouth Daily    Historical Provider, MD   Ubiquinol 100 MG CAPS Take 1 tablet by mouth daily. Historical Provider, MD   Bilberry 100 MG CAPS Take 100 mg by mouth Daily     Historical Provider, MD   fish oil-omega-3 fatty acids 1000 MG capsule Take 1 g by mouth daily. Historical Provider, MD   Chromium Picolinate 500 MCG TABS Take 500 mg by mouth daily. Historical Provider, MD   Vitamin E 400 UNIT TABS Take 400 mg by mouth daily. Historical Provider, MD   Ascorbic Acid  MG TBCR Take 500 mg by mouth daily. Historical Provider, MD   Methylsulfonylmethane (MSM) 1500 MG TABS Take 1,500 mg by mouth daily. Historical Provider, MD   Lycopene 10 MG CAPS Take by mouth daily 2 cap    Historical Provider, MD   Multiple Vitamin (MULTIVITAMIN PO) Take 1 tablet by mouth daily. Historical Provider, MD   Cholecalciferol (VITAMIN D3) 2000 UNIT TABS Take 2,000 mg by mouth daily. Historical Provider, MD   Pyridoxine HCl (VITAMIN B-6) 50 MG tablet Take 50 mg by mouth 2 times daily. Historical Provider, MD   Vitamins-Lipotropics (BALANCED B-50 COMPLEX PO) Take  by mouth 2 times daily. Historical Provider, MD       Current medications:    No current facility-administered medications for this visit. No current outpatient medications on file.      Facility-Administered Medications Ordered in Other Visits   Medication Dose Route Frequency Provider Last Rate Last Admin    0.9 % sodium chloride infusion   IntraVENous Continuous Lola Dozier MD        ceFAZolin (ANCEF) 2000 mg in dextrose 5 % 50 mL IVPB  2,000 mg IntraVENous 30 Min Pre-Op Lola Dozier MD           Allergies: Allergies   Allergen Reactions    Avodart [Dutasteride] Other (See Comments)     Chest tightness    Myrbetriq [Mirabegron] Other (See Comments)     Red spots       Problem List:    Patient Active Problem List   Diagnosis Code    Hyperlipidemia E78.5    Essential hypertension, benign I10    Obesity (BMI 30.0-34. 9) E66.9    Spinal stenosis of lumbar region with neurogenic claudication M48.062    Abnormal nuclear cardiac imaging test R93.1    Obstructive sleep apnea on CPAP G47.33, Z99.89    Primary localized osteoarthrosis, pelvic region and thigh M16.10    Anal fissure K60.2    Hyperglycemia R73.9    Chest pain at rest R07.9    Benign prostatic hyperplasia with incomplete bladder emptying N40.1, R39.14    Bladder tumor D49.4    Malignant neoplasm of trigone of urinary bladder (HCC) C67.0    Malignant neoplasm of posterior wall of urinary bladder (HCC) C67.4    BPH with obstruction/lower urinary tract symptoms N40.1, N13.8    Anxiety F41.9    Contusion of right foot S90.31XA    History of bladder cancer Z85.51       Past Medical History:        Diagnosis Date    Arthritis     CAD (coronary artery disease)     Chronic obstructive pulmonary disease ([facility] [address] 9/17/2019    GERD (gastroesophageal reflux disease)     Hyperglycemia 3/18/2016    Hyperlipidemia     Hypertension     IBS (irritable bowel syndrome)     Kidney stones     Malignant neoplasm of trigone of urinary bladder (HCC)     Obstructive sleep apnea     wears cpap    Prostatitis        Past Surgical History:        Procedure Laterality Date    ABDOMEN SURGERY      BACK SURGERY  07/08/2013    Lumbar Coyle L2-S-1, revision, PLIF L5-S-1 w/ grafting    CARDIAC CATHETERIZATION  9/2007    COLON SURGERY  2008    Dr. Vlila Seo  2011?     Dr. Marshal Babcock  2007    CYSTOSCOPY N/A 2/28/2020    CYSTOSCOPY TRANSURETHRAL RESECTION BLADDER TUMOR performed by Ramón Lai MD at [facility]    CYSTOSCOPY N/A 10/4/2021    CYSTOSCOPY, BLADDER BIOPSY WITH FULGURATION performed by Jaxon Nair MD at [address], ESOPHAGUS      ENDOSCOPY, COLON, DIAGNOSTIC      HERNIA REPAIR      Dr. Jory Arias Right 3/25/2014    right total hip    OTHER SURGICAL HISTORY  2016    TURBT by Dr Guillermo Montes De Oca       Social History:    Social History     Tobacco Use    Smoking status: Former     Packs/day: 1.00     Years: 25.00     Pack years: 25.00     Types: Cigarettes     Quit date: 1995     Years since quittin.6    Smokeless tobacco: Former   Substance Use Topics    Alcohol use: Yes     Alcohol/week: 1.0 standard drink     Types: 1 Cans of beer per week     Comment: seldom                                Counseling given: Not Answered      Vital Signs (Current): There were no vitals filed for this visit.                                            BP Readings from Last 3 Encounters:   22 (!) 145/70   22 112/64   22 122/78       NPO Status:                                                                                 BMI:   Wt Readings from Last 3 Encounters:   22 232 lb (105.2 kg)   22 232 lb (105.2 kg)   22 247 lb (112 kg)     There is no height or weight on file to calculate BMI.    CBC:   Lab Results   Component Value Date/Time    WBC 7.1 2022 09:03 AM    RBC 4.60 2022 09:03 AM    RBC 4.52 2021 07:03 AM    HGB 14.0 2022 09:03 AM    HCT 42.1 2022 09:03 AM    MCV 91.5 2022 09:03 AM    RDW 13.1 2021 07:03 AM     2022 09:03 AM       CMP:   Lab Results   Component Value Date/Time     2022 09:03 AM    K 3.9 2022 09:03 AM     2022 09:03 AM    CO2 23 2022 09:03 AM    BUN 21 2022 09:03 AM    CREATININE 0.8 2022 09:03 AM    LABGLOM >90 2022 09:03 AM    GLUCOSE 128 2022 09:03 AM    GLUCOSE 120 2021 07:03 AM    PROT 6.9 08/12/2022 09:03 AM    CALCIUM 9.2 08/12/2022 09:03 AM    BILITOT 1.6 08/12/2022 09:03 AM    BILITOT Negative 03/09/2020 07:06 AM    ALKPHOS 84 08/12/2022 09:03 AM    AST 22 08/12/2022 09:03 AM    ALT 19 08/12/2022 09:03 AM       POC Tests: No results for input(s): POCGLU, POCNA, POCK, POCCL, POCBUN, POCHEMO, POCHCT in the last 72 hours. Coags:   Lab Results   Component Value Date/Time    INR 1.01 02/27/2014 10:04 AM       HCG (If Applicable): No results found for: PREGTESTUR, PREGSERUM, HCG, HCGQUANT     ABGs: No results found for: PHART, PO2ART, SDG1TYK, GEJ6GAI, BEART, U8LCRBKG     Type & Screen (If Applicable):  Lab Results   Component Value Date    [address] POS 03/25/2014       Anesthesia Evaluation   no history of anesthetic complications:   Airway: Mallampati: II  TM distance: >3 FB   Neck ROM: full  Mouth opening: > = 3 FB   Dental:          Pulmonary:normal exam    (+) COPD:  sleep apnea: on CPAP,            Patient did not smoke on day of surgery. Cardiovascular:    (+) hypertension:, CAD:,                   Neuro/Psych:   Negative Neuro/Psych ROS              GI/Hepatic/Renal:   (+) GERD:,           Endo/Other: Negative Endo/Other ROS             Pt had no PAT visit       Abdominal:             Vascular: negative vascular ROS. Other Findings:             Anesthesia Plan      general     ASA 3       Induction: intravenous. MIPS: Postoperative opioids intended and Prophylactic antiemetics administered. Anesthetic plan and risks discussed with patient. Plan discussed with CRNA.                     Hossein Alvarado MD   8/26/2022

## 2022-08-26 NOTE — PROGRESS NOTES
Pt has met discharge criteria and states he is ready for discharge to home. IV removed, gauze and tape applied. Dressed in own clothes and personal belongings gathered. Discharge instructions (with opioid medication education information) given to pt and family; pt and family verbalized understanding of discharge instructions, prescriptions and follow up appointments. Pt transported to discharge lobby by [place] staff.

## 2022-08-26 NOTE — PROGRESS NOTES
Pt admitted to [facility] room 7 and oriented to unit. SCD sleeves applied. Nares swabbed. Pt verbalized permission for first name, last initial and physicians name on white board. SDS board and discharge criteria explained, pt and family verbalized understanding. Pt denies thoughts of harming self or others. Call light in reach. Family at the bedside.

## 2022-08-29 ENCOUNTER — NURSE ONLY (OUTPATIENT)
Dept: UROLOGY | Age: 77
End: 2022-08-29

## 2022-08-29 DIAGNOSIS — N40.1 BENIGN PROSTATIC HYPERPLASIA WITH URINARY OBSTRUCTION: Primary | ICD-10-CM

## 2022-08-29 DIAGNOSIS — N13.8 BENIGN PROSTATIC HYPERPLASIA WITH URINARY OBSTRUCTION: Primary | ICD-10-CM

## 2022-08-29 PROCEDURE — 99999 PR OFFICE/OUTPT VISIT,PROCEDURE ONLY: CPT

## 2022-08-29 RX ORDER — NITROGLYCERIN 0.4 MG/1
TABLET SUBLINGUAL
Qty: 25 TABLET | Refills: 0 | OUTPATIENT
Start: 2022-08-29

## 2022-08-29 NOTE — PROGRESS NOTES
Patient has given me verbal consent to perform mohan removal  Yes    48 cc of water deflated from mohan balloon. 22 Fr mohan removed without difficulty. Foreskin reduced back down? Yes      Pt will drink fluids and call office or report to ER in 4-6 hours if patient unable to urinate. F/u with provider on 9/16 with Dr. Kalyan King.

## 2022-09-03 PROCEDURE — 51702 INSERT TEMP BLADDER CATH: CPT

## 2022-09-03 PROCEDURE — 99283 EMERGENCY DEPT VISIT LOW MDM: CPT

## 2022-09-04 ENCOUNTER — HOSPITAL ENCOUNTER (EMERGENCY)
Age: 77
Discharge: HOME | End: 2022-09-04
Payer: MEDICARE

## 2022-09-04 VITALS
OXYGEN SATURATION: 97 % | TEMPERATURE: 97.9 F | RESPIRATION RATE: 18 BRPM | HEART RATE: 73 BPM | WEIGHT: 230 LBS | SYSTOLIC BLOOD PRESSURE: 160 MMHG | BODY MASS INDEX: 31.19 KG/M2 | DIASTOLIC BLOOD PRESSURE: 94 MMHG

## 2022-09-04 DIAGNOSIS — R33.9 URINARY RETENTION: Primary | ICD-10-CM

## 2022-09-04 LAB
ANION GAP SERPL CALCULATED.3IONS-SCNC: 12 MEQ/L (ref 8–16)
BACTERIA: ABNORMAL /HPF
BASOPHILS # BLD: 0.4 %
BASOPHILS ABSOLUTE: 0.1 THOU/MM3 (ref 0–0.1)
BILIRUBIN URINE: NEGATIVE
BLOOD, URINE: ABNORMAL
BUN BLDV-MCNC: 21 MG/DL (ref 7–22)
CALCIUM SERPL-MCNC: 8.8 MG/DL (ref 8.5–10.5)
CASTS 2: ABNORMAL /LPF
CASTS UA: ABNORMAL /LPF
CHARACTER, URINE: CLEAR
CHLORIDE BLD-SCNC: 102 MEQ/L (ref 98–111)
CO2: 23 MEQ/L (ref 23–33)
COLOR: ABNORMAL
CREAT SERPL-MCNC: 1 MG/DL (ref 0.4–1.2)
CRYSTALS, UA: ABNORMAL
EOSINOPHIL # BLD: 0.4 %
EOSINOPHILS ABSOLUTE: 0.1 THOU/MM3 (ref 0–0.4)
EPITHELIAL CELLS, UA: ABNORMAL /HPF
ERYTHROCYTE [DISTWIDTH] IN BLOOD BY AUTOMATED COUNT: 12.9 % (ref 11.5–14.5)
ERYTHROCYTE [DISTWIDTH] IN BLOOD BY AUTOMATED COUNT: 43 FL (ref 35–45)
GFR SERPL CREATININE-BSD FRML MDRD: 72 ML/MIN/1.73M2
GLUCOSE BLD-MCNC: 123 MG/DL (ref 70–108)
GLUCOSE URINE: NEGATIVE MG/DL
HCT VFR BLD CALC: 38.4 % (ref 42–52)
HEMOGLOBIN: 13.2 GM/DL (ref 14–18)
IMMATURE GRANS (ABS): 0.11 THOU/MM3 (ref 0–0.07)
IMMATURE GRANULOCYTES: 0.8 %
KETONES, URINE: NEGATIVE
LEUKOCYTE ESTERASE, URINE: ABNORMAL
LYMPHOCYTES # BLD: 9.4 %
LYMPHOCYTES ABSOLUTE: 1.3 THOU/MM3 (ref 1–4.8)
MCH RBC QN AUTO: 31.4 PG (ref 26–33)
MCHC RBC AUTO-ENTMCNC: 34.4 GM/DL (ref 32.2–35.5)
MCV RBC AUTO: 91.2 FL (ref 80–94)
MISCELLANEOUS 2: ABNORMAL
MONOCYTES # BLD: 8.1 %
MONOCYTES ABSOLUTE: 1.1 THOU/MM3 (ref 0.4–1.3)
NITRITE, URINE: NEGATIVE
NUCLEATED RED BLOOD CELLS: 0 /100 WBC
OSMOLALITY CALCULATION: 278.2 MOSMOL/KG (ref 275–300)
PH UA: 5.5 (ref 5–9)
PLATELET # BLD: 220 THOU/MM3 (ref 130–400)
PMV BLD AUTO: 10.4 FL (ref 9.4–12.4)
POTASSIUM SERPL-SCNC: 4.3 MEQ/L (ref 3.5–5.2)
PROTEIN UA: ABNORMAL
RBC # BLD: 4.21 MILL/MM3 (ref 4.7–6.1)
RBC URINE: > 100 /HPF
RENAL EPITHELIAL, UA: ABNORMAL
SEG NEUTROPHILS: 80.9 %
SEGMENTED NEUTROPHILS ABSOLUTE COUNT: 11.2 THOU/MM3 (ref 1.8–7.7)
SODIUM BLD-SCNC: 137 MEQ/L (ref 135–145)
SPECIFIC GRAVITY, URINE: 1.02 (ref 1–1.03)
UROBILINOGEN, URINE: 0.2 EU/DL (ref 0–1)
WBC # BLD: 13.8 THOU/MM3 (ref 4.8–10.8)
WBC UA: ABNORMAL /HPF
YEAST: ABNORMAL

## 2022-09-04 PROCEDURE — 85025 COMPLETE CBC W/AUTO DIFF WBC: CPT

## 2022-09-04 PROCEDURE — 87086 URINE CULTURE/COLONY COUNT: CPT

## 2022-09-04 PROCEDURE — 80048 BASIC METABOLIC PNL TOTAL CA: CPT

## 2022-09-04 PROCEDURE — 81001 URINALYSIS AUTO W/SCOPE: CPT

## 2022-09-04 ASSESSMENT — PAIN SCALES - GENERAL: PAINLEVEL_OUTOF10: 10

## 2022-09-04 ASSESSMENT — PAIN DESCRIPTION - LOCATION: LOCATION: GROIN

## 2022-09-04 ASSESSMENT — PAIN - FUNCTIONAL ASSESSMENT: PAIN_FUNCTIONAL_ASSESSMENT: 0-10

## 2022-09-04 NOTE — DISCHARGE INSTRUCTIONS
Increase your water intake. Leave mohan in until you see urology. Call Tuesday for an appointment. Return if the mohan becomes blocked.

## 2022-09-04 NOTE — ED TRIAGE NOTES
Patient presents to ED with chief complaint of Urinary retention. Patient arrives in severe pain. Indwelling mohan placed on arrival. Patient resting in bed. Respirations easy and unlabored. No distress noted. Call light within reach.

## 2022-09-05 LAB
ORGANISM: ABNORMAL
URINE CULTURE REFLEX: ABNORMAL

## 2022-09-06 ENCOUNTER — TELEPHONE (OUTPATIENT)
Dept: UROLOGY | Age: 77
End: 2022-09-06

## 2022-09-06 ASSESSMENT — ENCOUNTER SYMPTOMS
RHINORRHEA: 0
VOMITING: 0
EYE REDNESS: 0
CHEST TIGHTNESS: 0
BACK PAIN: 0
ABDOMINAL PAIN: 0
NAUSEA: 0
COUGH: 0

## 2022-09-06 NOTE — TELEPHONE ENCOUNTER
Patient called in this morning . Sent message to Dr. Alis Butler thru perfect serve. Sent phone call to [facility] about medication that he was on and ran out off.

## 2022-09-16 ENCOUNTER — OFFICE VISIT (OUTPATIENT)
Dept: UROLOGY | Age: 77
End: 2022-09-16

## 2022-09-16 VITALS — HEIGHT: 72 IN | BODY MASS INDEX: 31.15 KG/M2 | WEIGHT: 230 LBS

## 2022-09-16 DIAGNOSIS — R39.9 LOWER URINARY TRACT SYMPTOMS: ICD-10-CM

## 2022-09-16 DIAGNOSIS — C67.8 MALIGNANT NEOPLASM OF OVERLAPPING SITES OF BLADDER (HCC): ICD-10-CM

## 2022-09-16 DIAGNOSIS — N40.1 BENIGN PROSTATIC HYPERPLASIA WITH URINARY OBSTRUCTION: Primary | ICD-10-CM

## 2022-09-16 DIAGNOSIS — N13.8 BENIGN PROSTATIC HYPERPLASIA WITH URINARY OBSTRUCTION: Primary | ICD-10-CM

## 2022-09-16 PROCEDURE — 99024 POSTOP FOLLOW-UP VISIT: CPT | Performed by: UROLOGY

## 2022-09-16 NOTE — PROGRESS NOTES
Deflated balloon with 10 mls of sterile water. Removed 14fr mohan catheter without difficulty. Pt tolerated well. He was advised to drink plenty of fluids and if he hasn't urinated in 4-6 hours he needs to go to the ED.

## 2022-09-16 NOTE — PROGRESS NOTES
MD MD [facility] [address] [facility] Consultation / New Patient Visit    Patient:  Ann Marie Garcia  YOB: 1945  Date: 9/16/2022  Consult requested from Timothy Verduzco MD     HISTORY OF PRESENT ILLNESS:   The patient is a 67 y.o. male who presents today for follow-up for the following problem(s): history of bladder cancer, BPH with LUTs  Overall the problem(s) : are worsening. Associated Symptoms: No dysuria, gross hematuria. Pain Severity:      Today visit:   9/16/22     S/p Greenlight PVP for BPH (large gland) , post op urinary retention. Flomax (0.8 mg)     Summary of old records:   (Patient's old records, notes and chart reviewed and summarized above.)    Sp Cysto bladder biopsy  Bladder, biopsy:             Benign urothelial mucosa with von Brunn's nests. Negative for malignancy. Cystoscopy Operative Note  Surgeon: Jade Prince MD   Anesthesia: Urethral 2%  Indications: bladder cancer  Position: supine  Findings:   The patient was prepped and draped in the usual sterile fashion. The flexible cystoscope was advanced through the urethra and into the bladder. The bladder was thoroughly inspected and the following was noted:    Summary of old records:   (Patient's old records, notes and chart reviewed and summarized above.)    Residual Urine:  Significant  Urethra: No abnormalities of the urethra are noted. Prostate: Large gland Complete obstruction by lateral & small median lobe of prostate. Bladder: No tumors or CIS noted. No bladder diverticulum. Severe trabeculation noted. Ureters: Clear efflux from both ureters. Orifices with normal configuration and location. The cystoscope was removed. The patient tolerated the procedure well.   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 He is on Flomax and Marlen Cambria and Praful with respectable improvement although not completely satisfied. He feels he does not empty his bladder completely. He has nocturia x2.  UA today: WNL  PVR today: 105 mL. Last several PSA's:  Lab Results   Component Value Date    PSA 0.8 05/19/2016    PSA 0.7 10/06/2015    PSA 0.8 12/16/2014       Last total testosterone:  No results found for: TESTOSTERONE    Urinalysis today:  No results found for this visit on 09/16/22. Last BUN and creatinine:  Lab Results   Component Value Date    BUN 21 09/04/2022     Lab Results   Component Value Date    CREATININE 1.0 09/04/2022       Imaging Reviewed during this Office Visit:   (results were independently reviewed by physician and radiology report verified)    PAST MEDICAL, FAMILY AND SOCIAL HISTORY:  Past Medical History:   Diagnosis Date    Arthritis     CAD (coronary artery disease)     Chronic obstructive pulmonary disease ([address] 9/17/2019    GERD (gastroesophageal reflux disease)     Hyperglycemia 3/18/2016    Hyperlipidemia     Hypertension     IBS (irritable bowel syndrome)     Kidney stones     Malignant neoplasm of trigone of urinary bladder ([address]     Obstructive sleep apnea     wears cpap    Prostatitis      Past Surgical History:   Procedure Laterality Date    ABDOMEN SURGERY      BACK SURGERY  07/08/2013    Lumbar Coyle L2-S-1, revision, PLIF L5-S-1 w/ grafting    CARDIAC CATHETERIZATION  9/2007    COLON SURGERY  2008    Dr. Jose E Ochoa  2011?     Dr. Adrianne Keller  2007    CYSTOSCOPY N/A 2/28/2020    CYSTOSCOPY TRANSURETHRAL RESECTION BLADDER TUMOR performed by Saroj Bal MD at [address] N/A 10/4/2021    CYSTOSCOPY, BLADDER BIOPSY WITH FULGURATION performed by Maricel Blackburn MD at [facility], ESOPHAGUS      ENDOSCOPY, COLON, DIAGNOSTIC      HERNIA REPAIR  2005    Dr. Carlo Luz Right 3/25/2014    right total hip    OTHER SURGICAL HISTORY  08/25/2016    TURBT by Dr Regine SIFUENTESP N/A 8/26/2022    CYSTO WITH GREENLIGHT PHOTOVAPORIZATION OF THE PROSTATE performed by Rafita Draper MD at [facility] History   Problem Relation Age of Onset    Heart Disease Mother     Diabetes Mother     Kidney Disease Father     Cancer Brother         colon     Outpatient Medications Marked as Taking for the 9/16/22 encounter (Office Visit) with Rafita Draper MD   Medication Sig Dispense Refill    hyoscyamine (NULEV) 125 MCG TBDP dispersible tablet Take 1 tablet by mouth every 4 hours as needed (abd pain) 360 tablet 1    trospium (SANCTURA) 20 MG tablet Take 1 tablet by mouth daily (Patient taking differently: Take 20 mg by mouth at bedtime) 180 tablet 3    atorvastatin (LIPITOR) 40 MG tablet Take 1 tablet by mouth nightly 90 tablet 3    dicyclomine (BENTYL) 20 MG tablet Take 1 tablet by mouth 2 times daily 180 tablet 3    metoprolol tartrate (LOPRESSOR) 25 MG tablet Take 1 tablet by mouth daily 90 tablet 2    tamsulosin (FLOMAX) 0.4 MG capsule Take 2 capsules by mouth nightly 180 capsule 3    omeprazole (PRILOSEC) 20 MG delayed release capsule Take one tablet by mouth daily 90 capsule 3    lisinopril (PRINIVIL;ZESTRIL) 10 MG tablet Take 1 tablet by mouth daily 90 tablet 3    Calcium Polycarbophil (FIBER-CAPS PO) Take 2 tablets by mouth daily      NONFORMULARY as needed Diltiazem 2% gel apply to rectal fissure daily as directed      aspirin 81 MG EC tablet Take 81 mg by mouth daily      Multiple Vitamins-Minerals (PRESERVISION AREDS) TABS Take by mouth 2 times daily       UNABLE TO FIND Apple cider vinegar 625mg daily      NONFORMULARY Take 450 mg by mouth 2 times daily      nitroGLYCERIN (NITROSTAT) 0.4 MG SL tablet Place 1 tablet under the tongue every 5 minutes as needed for Chest pain 25 tablet 5    Cinnamon 500 MG CAPS Take 500 mg by mouth Daily      Milk Thistle 1000 MG CAPS Take 1,000 mg by mouth Daily      Ubiquinol 100 MG CAPS Take 1 tablet by mouth daily. Bilberry 100 MG CAPS Take 100 mg by mouth Daily       fish oil-omega-3 fatty acids 1000 MG capsule Take 1 g by mouth daily. Chromium Picolinate 500 MCG TABS Take 500 mg by mouth daily. Vitamin E 400 UNIT TABS Take 400 mg by mouth daily. Ascorbic Acid  MG TBCR Take 500 mg by mouth daily. Methylsulfonylmethane (MSM) 1500 MG TABS Take 1,500 mg by mouth daily. Lycopene 10 MG CAPS Take by mouth daily 2 cap      Multiple Vitamin (MULTIVITAMIN PO) Take 1 tablet by mouth daily. Cholecalciferol (VITAMIN D3) 2000 UNIT TABS Take 2,000 mg by mouth daily. Pyridoxine HCl (VITAMIN B-6) 50 MG tablet Take 50 mg by mouth 2 times daily. Vitamins-Lipotropics (BALANCED B-50 COMPLEX PO) Take  by mouth 2 times daily. Avodart [dutasteride] and Myrbetriq [mirabegron]  Social History     Tobacco Use   Smoking Status Former    Packs/day: 1.00    Years: 25.00    Pack years: 25.00    Types: Cigarettes    Quit date: 1995    Years since quittin.7   Smokeless Tobacco Former       Social History     Substance and Sexual Activity   Alcohol Use Yes    Alcohol/week: 1.0 standard drink    Types: 1 Cans of beer per week    Comment: seldom       REVIEW OF SYSTEMS:  Constitutional: negative  Eyes: negative  Respiratory: negative  Cardiovascular: negative  Gastrointestinal: negative  Musculoskeletal: negative  Genitourinary: negative  Skin: negative   Neurological: negative  Hematological/Lymphatic: negative  Psychological: negative    Physical Exam:    This a 67 y.o. male   There were no vitals filed for this visit. Constitutional: Patient in no acute distress   Neuro: alert and oriented to person place and time.     Psych: Mood and affect normal.  Head: atraumatic normocephalic  Eyes: EOMi  HEENT: neck supple, trachea midline  Lungs: Respiratory effort normal  Cardiovascular:  Normal peripheral pulses  Abdomen: Soft, non-tender, non-distended, No CVA  Bladder: non-tender and not distended. FROMx4, no cyanosis clubbing edema  Skin: warm and dry      Assessment and Plan      1. Benign prostatic hyperplasia with urinary obstruction    2. Lower urinary tract symptoms    3. Malignant neoplasm of overlapping sites of bladder [facility])             Plan:      No follow-ups on file. Cysto bladder biopsy with fulguration - negative, usman black - will monitor    Greenlight PVP for BPH with LUTs - post op retention with catheterization.

## 2022-09-21 NOTE — PROGRESS NOTES
Called patient  scheduled for heart cath  instructed to bring in  license,  Insurance cards, overnight bag, medications in the original bottles. Patient instructed on precedure and where and when to arrive. Medication to take day of procedure  went over with patient. NPO after midnight , 12 hour fasting. Wear comfortable clean clothes, shower morning of and night before with liquid antibacterial,and  remove jewery. Follow all instructions given  to you by your doctor. Please notify your doctor if you need to cancel or reschedule your procedure.  needed at discharge.        I

## 2022-09-22 ENCOUNTER — TELEPHONE (OUTPATIENT)
Dept: UROLOGY | Age: 77
End: 2022-09-22

## 2022-09-22 ENCOUNTER — NURSE ONLY (OUTPATIENT)
Dept: UROLOGY | Age: 77
End: 2022-09-22
Payer: MEDICARE

## 2022-09-22 DIAGNOSIS — R30.0 DYSURIA: ICD-10-CM

## 2022-09-22 DIAGNOSIS — N13.8 BENIGN PROSTATIC HYPERPLASIA WITH URINARY OBSTRUCTION: Primary | ICD-10-CM

## 2022-09-22 DIAGNOSIS — N40.1 BENIGN PROSTATIC HYPERPLASIA WITH URINARY OBSTRUCTION: Primary | ICD-10-CM

## 2022-09-22 PROCEDURE — 81003 URINALYSIS AUTO W/O SCOPE: CPT | Performed by: NURSE PRACTITIONER

## 2022-09-22 PROCEDURE — 51798 US URINE CAPACITY MEASURE: CPT | Performed by: NURSE PRACTITIONER

## 2022-09-22 RX ORDER — CIPROFLOXACIN 500 MG/1
500 TABLET, FILM COATED ORAL 2 TIMES DAILY
Qty: 20 TABLET | Refills: 0 | Status: SHIPPED | OUTPATIENT
Start: 2022-09-22 | End: 2022-09-26

## 2022-09-22 NOTE — TELEPHONE ENCOUNTER
Patient stated his appointment was changed to Monday. C/o a lot of frequency and wanted something ordered. Patient was late for another appointment and unable to give more information. He will call the office back today.

## 2022-09-22 NOTE — PROGRESS NOTES
Pt came in today for a PVR & UA. He had a green light on 8/26 with Dr. Eleuterio Li and on 9/16 he had his catheter removed. Pt stated that he has to urinate every 15 minutes or so. He feels like something is squishing his prostate. After speaking with [facility], will start antibiotic and sent urine for culture. If he is still on trospium will stop that and continue Flomax.

## 2022-09-22 NOTE — PROGRESS NOTES
I have personally verified, reviewed, and approved these actions. Send urine for culture. Start Cipro--script sent to pharmacy. Stop trospium and continue tamsulosin 0.8 mg daily.

## 2022-09-24 LAB
ORGANISM: ABNORMAL
URINE CULTURE, ROUTINE: ABNORMAL

## 2022-09-26 ENCOUNTER — TELEPHONE (OUTPATIENT)
Dept: UROLOGY | Age: 77
End: 2022-09-26

## 2022-09-26 RX ORDER — SULFAMETHOXAZOLE AND TRIMETHOPRIM 800; 160 MG/1; MG/1
1 TABLET ORAL 2 TIMES DAILY
Qty: 28 TABLET | Refills: 0 | Status: SHIPPED | OUTPATIENT
Start: 2022-09-26 | End: 2022-10-10

## 2022-09-26 NOTE — TELEPHONE ENCOUNTER
Pt's urine culture significant for UTI from [facility]. Stop Cipro as it will not effectively treat the infection and start Bactrim. Script sent to pharmacy.

## 2022-09-27 ENCOUNTER — HOSPITAL ENCOUNTER (OUTPATIENT)
Dept: INPATIENT UNIT | Age: 77
Discharge: HOME | End: 2022-09-27

## 2022-09-27 RX ORDER — ASPIRIN 325 MG
325 TABLET ORAL ONCE
Status: CANCELLED | OUTPATIENT
Start: 2022-09-27 | End: 2022-09-27

## 2022-09-27 RX ORDER — NITROGLYCERIN 0.4 MG/1
0.4 TABLET SUBLINGUAL EVERY 5 MIN PRN
Status: CANCELLED | OUTPATIENT
Start: 2022-09-27

## 2022-09-27 RX ORDER — SODIUM CHLORIDE 0.9 % (FLUSH) 0.9 %
5-40 SYRINGE (ML) INJECTION PRN
Status: CANCELLED | OUTPATIENT
Start: 2022-09-27

## 2022-09-27 RX ORDER — SODIUM CHLORIDE 9 MG/ML
INJECTION, SOLUTION INTRAVENOUS CONTINUOUS
Status: CANCELLED | OUTPATIENT
Start: 2022-09-27

## 2022-09-27 RX ORDER — SODIUM CHLORIDE 0.9 % (FLUSH) 0.9 %
5-40 SYRINGE (ML) INJECTION EVERY 12 HOURS SCHEDULED
Status: CANCELLED | OUTPATIENT
Start: 2022-09-27

## 2022-10-11 ENCOUNTER — HOSPITAL ENCOUNTER (EMERGENCY)
Age: 77
Discharge: HOME | End: 2022-10-11
Attending: EMERGENCY MEDICINE
Payer: MEDICARE

## 2022-10-11 ENCOUNTER — PATIENT MESSAGE (OUTPATIENT)
Dept: UROLOGY | Age: 77
End: 2022-10-11

## 2022-10-11 VITALS
WEIGHT: 225 LBS | SYSTOLIC BLOOD PRESSURE: 142 MMHG | TEMPERATURE: 97.7 F | HEART RATE: 78 BPM | BODY MASS INDEX: 30.48 KG/M2 | DIASTOLIC BLOOD PRESSURE: 93 MMHG | HEIGHT: 72 IN | OXYGEN SATURATION: 96 % | RESPIRATION RATE: 17 BRPM

## 2022-10-11 DIAGNOSIS — R31.9 URINARY TRACT INFECTION WITH HEMATURIA, SITE UNSPECIFIED: Primary | ICD-10-CM

## 2022-10-11 DIAGNOSIS — N39.0 URINARY TRACT INFECTION WITH HEMATURIA, SITE UNSPECIFIED: Primary | ICD-10-CM

## 2022-10-11 LAB
AMORPHOUS: ABNORMAL
BACTERIA: ABNORMAL /HPF
BILIRUBIN URINE: NEGATIVE
BLOOD, URINE: ABNORMAL
CASTS UA: ABNORMAL /LPF
CHARACTER, URINE: ABNORMAL
COLOR: YELLOW
CRYSTALS, UA: ABNORMAL
EPITHELIAL CELLS, UA: ABNORMAL /HPF
GLUCOSE URINE: NEGATIVE MG/DL
KETONES, URINE: NEGATIVE
LEUKOCYTE ESTERASE, URINE: ABNORMAL
MUCUS: ABNORMAL
NITRITE, URINE: NEGATIVE
PH UA: 5.5 (ref 5–9)
PROTEIN UA: 30
RBC URINE: ABNORMAL /HPF
SPECIFIC GRAVITY, URINE: 1.01 (ref 1–1.03)
UROBILINOGEN, URINE: 0.2 EU/DL (ref 0–1)
WBC UA: ABNORMAL /HPF
YEAST: ABNORMAL

## 2022-10-11 PROCEDURE — 87086 URINE CULTURE/COLONY COUNT: CPT

## 2022-10-11 PROCEDURE — 51798 US URINE CAPACITY MEASURE: CPT

## 2022-10-11 PROCEDURE — 87186 SC STD MICRODIL/AGAR DIL: CPT

## 2022-10-11 PROCEDURE — 87077 CULTURE AEROBIC IDENTIFY: CPT

## 2022-10-11 PROCEDURE — 99283 EMERGENCY DEPT VISIT LOW MDM: CPT | Performed by: EMERGENCY MEDICINE

## 2022-10-11 PROCEDURE — 87147 CULTURE TYPE IMMUNOLOGIC: CPT

## 2022-10-11 PROCEDURE — 81001 URINALYSIS AUTO W/SCOPE: CPT

## 2022-10-11 RX ORDER — FLUCONAZOLE 100 MG/1
200 TABLET ORAL DAILY
Qty: 14 TABLET | Refills: 0 | Status: SHIPPED | OUTPATIENT
Start: 2022-10-11 | End: 2022-10-18

## 2022-10-11 RX ORDER — SULFAMETHOXAZOLE AND TRIMETHOPRIM 800; 160 MG/1; MG/1
1 TABLET ORAL 2 TIMES DAILY
Qty: 14 TABLET | Refills: 0 | Status: SHIPPED | OUTPATIENT
Start: 2022-10-11 | End: 2022-10-15

## 2022-10-11 ASSESSMENT — ENCOUNTER SYMPTOMS
EYE PAIN: 0
SHORTNESS OF BREATH: 0
SINUS PAIN: 0
VOMITING: 0
CONSTIPATION: 0
ABDOMINAL PAIN: 0
COUGH: 0
BACK PAIN: 0
NAUSEA: 0
DIARRHEA: 0

## 2022-10-11 ASSESSMENT — PAIN DESCRIPTION - LOCATION: LOCATION: GROIN

## 2022-10-11 ASSESSMENT — PAIN DESCRIPTION - PAIN TYPE: TYPE: ACUTE PAIN

## 2022-10-11 ASSESSMENT — PAIN DESCRIPTION - DESCRIPTORS: DESCRIPTORS: BURNING;ACHING

## 2022-10-11 ASSESSMENT — PAIN - FUNCTIONAL ASSESSMENT: PAIN_FUNCTIONAL_ASSESSMENT: 0-10

## 2022-10-11 NOTE — ED PROVIDER NOTES
[place] ENCOUNTER          Pt Name: Robert Good  MRN: [id number]  [place] 1945  Date of evaluation: 10/11/2022  Treating Resident Physician: Janee Diop MD  Supervising Physician: Dr. Ronda Delong, [address] [place] [address]       Chief Complaint   Patient presents with    Dysuria     History obtained from the patient. HISTORY OF PRESENT ILLNESS    HPI  Robert Good is a 65 y.o. male who presents to the emergency department for evaluation of dysuria. Patient states he had a greenlight procedure on his prostate at the end of August.  States has had dysuria since then. States that few weeks ago he went to the urology office and he had a cystoscopy performed. He was given antibiotics for a UTI. States he has completed 2 weeks of antibiotics. States he still is having dysuria. States that it was worse now so he came in. Denies any hematuria. States that he has a weak stream since the procedure. States that he occasionally has some difficulty starting his urinary stream.  Denies any fevers or chills. Denies abdominal pain. Denies any flank pain. Denies any drainage or discharge. Patient denies any new Headache, Fever, Chills, Cough, Chest pain, Shortness of breath, Abdominal pain, Nausea, Vomiting, Diarrhea, Constipation, and Leg swelling. The patient has no other acute complaints at this time. REVIEW OF SYSTEMS   Review of Systems   Constitutional:  Negative for chills and fever. HENT:  Negative for ear pain and sinus pain. Eyes:  Negative for pain. Respiratory:  Negative for cough and shortness of breath. Cardiovascular:  Negative for chest pain and leg swelling. Gastrointestinal:  Negative for abdominal pain, constipation, diarrhea, nausea and vomiting. Genitourinary:  Positive for difficulty urinating and dysuria. Negative for flank pain. Musculoskeletal:  Negative for back pain and neck pain. Skin:  Negative for wound. Neurological:  Negative for headaches. Psychiatric/Behavioral:  Negative for confusion. PAST MEDICAL AND SURGICAL HISTORY     Past Medical History:   Diagnosis Date    Arthritis     CAD (coronary artery disease)     Chronic obstructive pulmonary disease ([facility] [address] 9/17/2019    GERD (gastroesophageal reflux disease)     Hyperglycemia 3/18/2016    Hyperlipidemia     Hypertension     IBS (irritable bowel syndrome)     Kidney stones     Malignant neoplasm of trigone of urinary bladder (HCC)     Obstructive sleep apnea     wears cpap    Prostatitis      Past Surgical History:   Procedure Laterality Date    ABDOMEN SURGERY      BACK SURGERY  07/08/2013    Lumbar Coyle L2-S-1, revision, PLIF L5-S-1 w/ grafting    CARDIAC CATHETERIZATION  9/2007    COLON SURGERY  2008    Dr. Ashraf Grew 2011? Dr. Dionicio Farnsworth  2007    CYSTOSCOPY N/A 2/28/2020    CYSTOSCOPY TRANSURETHRAL RESECTION BLADDER TUMOR performed by Kandy Moore MD at [place] [address] N/A 10/4/2021    CYSTOSCOPY, BLADDER BIOPSY WITH FULGURATION performed by Prema Gregory MD at [address] [place] [address], ESOPHAGUS      ENDOSCOPY, [facility]  2005    Dr. John Kelly Right 3/25/2014    right total hip    OTHER SURGICAL HISTORY  08/25/2016    TURBT by Dr Floyd Fischer    TURP N/A 8/26/2022    CYSTO WITH GREENLIGHT PHOTOVAPORIZATION OF THE PROSTATE performed by Prema Gregory MD at [facility]   No current facility-administered medications for this encounter.     Current Outpatient Medications:     sulfamethoxazole-trimethoprim (BACTRIM DS) 800-160 MG per tablet, Take 1 tablet by mouth 2 times daily for 7 days, Disp: 14 tablet, Rfl: 0    fluconazole (DIFLUCAN) 100 MG tablet, Take 2 tablets by mouth daily for 7 days, Disp: 14 tablet, Rfl: 0    hyoscyamine (NULEV) 125 MCG TBDP dispersible tablet, Take 1 tablet by mouth every 4 hours as needed (abd pain), Disp: 360 tablet, Rfl: 1    atorvastatin (LIPITOR) 40 MG tablet, Take 1 tablet by mouth nightly, Disp: 90 tablet, Rfl: 3    dicyclomine (BENTYL) 20 MG tablet, Take 1 tablet by mouth 2 times daily, Disp: 180 tablet, Rfl: 3    metoprolol tartrate (LOPRESSOR) 25 MG tablet, Take 1 tablet by mouth daily, Disp: 90 tablet, Rfl: 2    tamsulosin (FLOMAX) 0.4 MG capsule, Take 2 capsules by mouth nightly, Disp: 180 capsule, Rfl: 3    omeprazole (PRILOSEC) 20 MG delayed release capsule, Take one tablet by mouth daily, Disp: 90 capsule, Rfl: 3    lisinopril (PRINIVIL;ZESTRIL) 10 MG tablet, Take 1 tablet by mouth daily, Disp: 90 tablet, Rfl: 3    Calcium Polycarbophil (FIBER-CAPS PO), Take 2 tablets by mouth daily, Disp: , Rfl:     NONFORMULARY, as needed Diltiazem 2% gel apply to rectal fissure daily as directed, Disp: , Rfl:     aspirin 81 MG EC tablet, Take 81 mg by mouth daily, Disp: , Rfl:     Multiple Vitamins-Minerals (PRESERVISION AREDS) TABS, Take by mouth 2 times daily , Disp: , Rfl:     UNABLE TO FIND, Apple cider vinegar 625mg daily, Disp: , Rfl:     NONFORMULARY, Take 450 mg by mouth 2 times daily, Disp: , Rfl:     nitroGLYCERIN (NITROSTAT) 0.4 MG SL tablet, Place 1 tablet under the tongue every 5 minutes as needed for Chest pain, Disp: 25 tablet, Rfl: 5    Cinnamon 500 MG CAPS, Take 500 mg by mouth Daily, Disp: , Rfl:     Milk Thistle 1000 MG CAPS, Take 1,000 mg by mouth Daily, Disp: , Rfl:     Ubiquinol 100 MG CAPS, Take 1 tablet by mouth daily. , Disp: , Rfl:     Bilberry 100 MG CAPS, Take 100 mg by mouth Daily , Disp: , Rfl:     fish oil-omega-3 fatty acids 1000 MG capsule, Take 1 g by mouth daily. , Disp: , Rfl:     Chromium Picolinate 500 MCG TABS, Take 500 mg by mouth daily. , Disp: , Rfl:     Vitamin E 400 UNIT TABS, Take 400 mg by mouth daily. , Disp: , Rfl:     Ascorbic Acid  MG TBCR, Take 500 mg by mouth daily.   , Disp: , Rfl: Methylsulfonylmethane (MSM) 1500 MG TABS, Take 1,500 mg by mouth daily. , Disp: , Rfl:     Lycopene 10 MG CAPS, Take by mouth daily 2 cap, Disp: , Rfl:     Multiple Vitamin (MULTIVITAMIN PO), Take 1 tablet by mouth daily. , Disp: , Rfl:     Cholecalciferol (VITAMIN D3) 2000 UNIT TABS, Take 2,000 mg by mouth daily. , Disp: , Rfl:     Pyridoxine HCl (VITAMIN B-6) 50 MG tablet, Take 50 mg by mouth 2 times daily. , Disp: , Rfl:     Vitamins-Lipotropics (BALANCED B-50 COMPLEX PO), Take  by mouth 2 times daily. , Disp: , Rfl:       SOCIAL HISTORY     Social History     Social History Narrative    Not on file     Social History     Tobacco Use    Smoking status: Former     Packs/day: 1.00     Years: 25.00     Pack years: 25.00     Types: Cigarettes     Quit date: 1995     Years since quittin.7    Smokeless tobacco: Former   Substance Use Topics    Alcohol use: Yes     Alcohol/week: 1.0 standard drink     Types: 1 Cans of beer per week     Comment: seldom    Drug use: No         ALLERGIES     Allergies   Allergen Reactions    Avodart [Dutasteride] Other (See Comments)     Chest tightness    Myrbetriq [Mirabegron] Other (See Comments)     Red spots         FAMILY HISTORY     Family History   Problem Relation Age of Onset    Heart Disease Mother     Diabetes Mother     Kidney Disease Father     Cancer Brother         colon         PREVIOUS RECORDS   Previous records reviewed:  Patient was seen last on 2022 for urology postop office visit . PHYSICAL EXAM     ED Triage Vitals   BP Temp Temp src Pulse Resp SpO2 Height Weight   -- -- -- -- -- -- -- --     Initial vital signs and nursing assessment reviewed and vitals are/show: Afebrile, Hypertensive, Normocardic, and Normal RR. Pulsoximetry is normal per my interpretation. Additional Vital Signs:  Vitals:    10/11/22 0814   BP:    Pulse: 78   Resp:    Temp:    SpO2:        Physical Exam  Constitutional:       General: He is not in acute distress. Appearance: Normal appearance. He is not ill-appearing, toxic-appearing or diaphoretic. HENT:      Head: Normocephalic and atraumatic. Right Ear: External ear normal.      Left Ear: External ear normal.   Eyes:      General: No scleral icterus. Right eye: No discharge. Left eye: No discharge. Cardiovascular:      Rate and Rhythm: Normal rate and regular rhythm. Pulmonary:      Effort: Pulmonary effort is normal. No respiratory distress. Breath sounds: Normal breath sounds. No stridor. No wheezing, rhonchi or rales. Chest:      Chest wall: No tenderness. Abdominal:      General: Abdomen is flat. There is no distension. Palpations: Abdomen is soft. Tenderness: There is no abdominal tenderness. There is no right CVA tenderness, left CVA tenderness, guarding or rebound. Genitourinary:     Penis: Normal.       Testes: Normal.      Comments: No testicular pain or swelling, no scrotal pain or swelling, no penile lesions or abrasions. No erythema. No expressible drainage or discharge. Able to retract foreskin. Musculoskeletal:      Cervical back: Neck supple. Right lower leg: No edema. Left lower leg: No edema. Skin:     General: Skin is warm and dry. Neurological:      Mental Status: He is alert and oriented to person, place, and time. Mental status is at baseline. Psychiatric:         Mood and Affect: Mood normal.         Behavior: Behavior normal.         Thought Content: Thought content normal.         Judgment: Judgment normal.           MEDICAL DECISION MAKING   Initial Assessment:     74-year-old male presenting to the ED for dysuria    Differential diagnoses include but not limited to: UTI pyelonephritis postop complication hematuria     Plan:       UA  Bladder scan  Urology consult  Discharge          Patient is a 65 y.o. male who was seen and evaluated in the emergency department for UTI.   His urine showed  WBCs as well as few bacteria as well as some budding yeast.  His vital signs are stable. His exam was benign he had no peritoneal signs or CVA tenderness suggestive of ascending UTI. I reached out to urology who recommended Bactrim as well as Diflucan. Patient discharged with Bactrim and Diflucan and urology follow-up. ED RESULTS   Laboratory results:  Labs Reviewed   URINE WITH REFLEXED MICRO - Abnormal; Notable for the following components:       Result Value    Blood, Urine MODERATE (*)     Protein, UA 30 (*)     Leukocyte Esterase, Urine LARGE (*)     Character, Urine CLOUDY (*)     All other components within normal limits   CULTURE, REFLEXED, URINE       Radiologic studies results:  No orders to display       ED Medications administered this visit: Medications - No data to display      ED COURSE     ED Course as of 10/11/22 0832   Tue Oct 11, 2022   0812 UA shows moderate blood, large leukocyte Estrace, cloudy character, amorphous debris, 7500 WBCs, few bacteria and moderate budding yeast [CR]   0816 Left message with urology, Maura Mukherjee [CR]   0725 Urology recommends Bactrim for 1 week and Diflucan for 1 week. [CR]      ED Course User Index  [CR] Festus Fang MD        Strict return precautions and follow up instructions were discussed with the patient prior to discharge, with which the patient agrees. MEDICATION CHANGES     New Prescriptions    FLUCONAZOLE (DIFLUCAN) 100 MG TABLET    Take 2 tablets by mouth daily for 7 days    SULFAMETHOXAZOLE-TRIMETHOPRIM (BACTRIM DS) 800-160 MG PER TABLET    Take 1 tablet by mouth 2 times daily for 7 days         FINAL DISPOSITION     Final diagnoses:   Urinary tract infection with hematuria, site unspecified     Condition: condition: stable  Dispo: Discharge to home      This transcription was electronically signed.  Parts of this transcriptions may have been dictated by use of voice recognition software and electronically transcribed, and parts may have been transcribed with the assistance of an ED scribe. The transcription may contain errors not detected in proofreading. Please refer to my supervising physician's documentation if my documentation differs.     Electronically Signed: Alanson Leventhal, MD, 10/11/22, 8:32 AM          Alanson Leventhal, MD  Resident  10/11/22 9824

## 2022-10-11 NOTE — TELEPHONE ENCOUNTER
I am sorry to hear that the patient is in the ER.  Per your advise, please have him schedule up a follow up visit

## 2022-10-11 NOTE — ED TRIAGE NOTES
Presents to ER with complaints of problems urinating that has been going on for approximately one month since having prostate surgery. Pt states since last night he has noted he has been feeling the need to urinate all the time and he has constant burning.  Pt states something is wrong, states he feels there is a yolanda in his penis and he does not have a good stream.

## 2022-10-11 NOTE — ED PROVIDER NOTES
I performed a history and physical examination of the patient and discussed management with the resident. I reviewed the residents note and agree with the documented findings and plan of care. Any areas of disagreement are noted on the chart. I was personally present for the key portions of any procedures. I have documented in the chart those procedures where I was not present during the key portions. I have reviewed the emergency nurses triage note. I agree with the chief complaint, past medical history, past surgical history, allergies, medications, social and family history as documented unless otherwise noted below. Documentation of the HPI, Physical Exam and Medical Decision Making performed by medical students or scribes is based on my personal performance of the HPI, PE and MDM. For Phys Assistant/ Nurse Practitioner cases/documentation I have personally evaluated this patient and have completed at least one if not all key elements of the E/M (history, physical exam, and MDM). My findings are as noted below     Patient presents today with difficulty urinating. Has been 1 month since prostate surgery. Patient states that he feels like he needs to urinate all the time. He has constant burning. He does state that he feels like he has not completely voided. Patient is otherwise resting on the cot no apparent distress no other physical complaints at this time      No orders to display     Labs Reviewed   URINE WITH REFLEXED MICRO - Abnormal; Notable for the following components:       Result Value    Blood, Urine MODERATE (*)     Protein, UA 30 (*)     Leukocyte Esterase, Urine LARGE (*)     Character, Urine CLOUDY (*)     All other components within normal limits   CULTURE, REFLEXED, URINE         Final diagnoses:   Urinary tract infection with hematuria, site unspecified   . I have seen this patient with the resident Dr. Cee Mckinley and agree with his assessment and plan.      Kaleb Lopez, DO  10/11/22 [facility]

## 2022-10-11 NOTE — TELEPHONE ENCOUNTER
From: Ann Marie Garcia  To: Dr. Healy Ek: 10/11/2022 3:16 AM EDT  Subject: Urinating burning    I have finished all the latest meds, yesterday, I still have burning feeling and very frequent urinating and discomfort. Do I have any follow up appointment.

## 2022-10-11 NOTE — DISCHARGE INSTRUCTIONS
Take the full course of the Bactrim and the Diflucan for 1 week  Follow-up with Dr. Shelton Mcburney office  Follow-up with your family doctor within a week

## 2022-10-13 LAB
ORGANISM: ABNORMAL
URINE CULTURE REFLEX: ABNORMAL
URINE CULTURE REFLEX: ABNORMAL

## 2022-10-14 ENCOUNTER — TELEPHONE (OUTPATIENT)
Dept: PHARMACY | Age: 77
End: 2022-10-14

## 2022-10-14 NOTE — TELEPHONE ENCOUNTER
Pharmacy Note  ED Culture Follow-up    Bijal Gray is a 65 y.o. male. Allergies: Avodart [dutasteride] and Myrbetriq [mirabegron]     Labs:  Lab Results   Component Value Date    BUN 21 09/04/2022    CREATININE 1.0 09/04/2022    WBC 13.8 (H) 09/04/2022     Estimated Creatinine Clearance: 75 mL/min (based on SCr of 1 mg/dL). Current antimicrobials:   Bactrim    ASSESSMENT:  Micro results:   Urine culture: positive for MRSE     PLAN:  Need for intervention: Yes  Discussed with: Rock Zuleyka MD  Chosen treatment:    Stop Bactrim and start doxycycline 100 mg BID x 7 days. Patient response:   Call attempt #1, did not reach patient    Called/sent in prescription to: Not applicable    Please call with any questions.  [address] [place]    Kelly Jett, [facility], PharmD 4:21 PM 10/14/2022

## 2022-10-15 RX ORDER — DOXYCYCLINE HYCLATE 100 MG
100 TABLET ORAL 2 TIMES DAILY
Qty: 14 TABLET | Refills: 0 | Status: SHIPPED | OUTPATIENT
Start: 2022-10-15 | End: 2022-10-22

## 2022-10-15 NOTE — TELEPHONE ENCOUNTER
Patient made aware of results. He understands that he needs to stop the Bactrim and start doxycycline 100 mg BID x 7 days.   Prescription sent to [facility] DR WILFREDO MARTINEZ per patient request.

## 2022-10-25 ENCOUNTER — NURSE ONLY (OUTPATIENT)
Dept: UROLOGY | Age: 77
End: 2022-10-25
Payer: MEDICARE

## 2022-10-25 DIAGNOSIS — N40.1 BPH WITH OBSTRUCTION/LOWER URINARY TRACT SYMPTOMS: ICD-10-CM

## 2022-10-25 DIAGNOSIS — N13.8 BPH WITH OBSTRUCTION/LOWER URINARY TRACT SYMPTOMS: ICD-10-CM

## 2022-10-25 LAB
BILIRUBIN, POC: NEGATIVE
BLOOD URINE, POC: ABNORMAL
CLARITY, POC: ABNORMAL
COLOR, POC: YELLOW
GLUCOSE URINE, POC: NEGATIVE
KETONES, POC: NEGATIVE
LEUKOCYTE EST, POC: ABNORMAL
NITRITE, POC: NEGATIVE
PH, POC: 6
POST VOID RESIDUAL (PVR): 100 ML
PROTEIN, POC: 100
SPECIFIC GRAVITY, POC: 1.02
UROBILINOGEN, POC: 0.2

## 2022-10-25 PROCEDURE — 51798 US URINE CAPACITY MEASURE: CPT

## 2022-10-25 PROCEDURE — 81003 URINALYSIS AUTO W/O SCOPE: CPT

## 2022-10-25 RX ORDER — TAMSULOSIN HYDROCHLORIDE 0.4 MG/1
0.8 CAPSULE ORAL 2 TIMES DAILY
Qty: 180 CAPSULE | Refills: 3 | Status: SHIPPED | OUTPATIENT
Start: 2022-10-25 | End: 2023-10-25

## 2022-10-25 RX ORDER — DOXYCYCLINE HYCLATE 100 MG
100 TABLET ORAL 2 TIMES DAILY
Qty: 20 TABLET | Refills: 0 | Status: SHIPPED | OUTPATIENT
Start: 2022-10-25 | End: 2022-11-04

## 2022-10-25 NOTE — TELEPHONE ENCOUNTER
Sorry to hear that the first round of doxycycline was ineffective.  As advised, please have the patient schedule an ED follow up visit

## 2022-10-25 NOTE — PROGRESS NOTES
-patient presents for lab/ma visit  -reports that he has significant concerns since the procedure. States that he initially had a lack of appetite and lost 30 lbs. Reports that his appetite has since improved.   -Has also had several UTIs since the procedure. UA today remarkable for possible infection, will send for culture. -Did c/o weak stream. PVR of 100 mL. Denies lower abdominal pressure and pain at this time. Denies fever. -c/o urinary frequency and dysuria, as well as nocturia. Patient states he is getting up every 1-1.5 hours. -Recent history of urinary retention which did require him to have his mohan catheter replaced. The patient should go to the ED if he develops fever, chills, nausea, vomiting, chest pain, SOB, calf pain, feelings of incomplete emptying, or should they otherwise feel they need evaluated      -Sent urine for culture  -sent doxycycline to the pharmacy  -will have patient take flomax 0.4 mg BID   -can consider Mybetriq in the future.

## 2022-10-27 LAB — URINE CULTURE, ROUTINE: NORMAL

## 2022-10-27 NOTE — PROGRESS NOTES
Would it be possible to send the patient's urine for urine guidance testing or to have the patient give another sample to do so?

## 2022-11-02 NOTE — TELEPHONE ENCOUNTER
Please review my chart message and urine culture from 10/25/2022    No growth on Ux  Discuss at OV 11/8/22

## 2022-11-08 ENCOUNTER — OFFICE VISIT (OUTPATIENT)
Dept: UROLOGY | Age: 77
End: 2022-11-08
Payer: MEDICARE

## 2022-11-08 VITALS — BODY MASS INDEX: 31.15 KG/M2 | RESPIRATION RATE: 20 BRPM | HEIGHT: 72 IN | WEIGHT: 230 LBS

## 2022-11-08 DIAGNOSIS — N13.8 BPH WITH OBSTRUCTION/LOWER URINARY TRACT SYMPTOMS: Primary | ICD-10-CM

## 2022-11-08 DIAGNOSIS — N40.1 BPH WITH OBSTRUCTION/LOWER URINARY TRACT SYMPTOMS: Primary | ICD-10-CM

## 2022-11-08 DIAGNOSIS — R30.0 DYSURIA: ICD-10-CM

## 2022-11-08 LAB
BILIRUBIN URINE: NEGATIVE
BLOOD URINE, POC: ABNORMAL
CHARACTER, URINE: CLEAR
COLOR, URINE: YELLOW
GLUCOSE URINE: NEGATIVE MG/DL
KETONES, URINE: NEGATIVE
LEUKOCYTE CLUMPS, URINE: ABNORMAL
NITRITE, URINE: NEGATIVE
PH, URINE: 5.5 (ref 5–9)
PROTEIN, URINE: 30 MG/DL
SPECIFIC GRAVITY, URINE: 1.02 (ref 1–1.03)
UROBILINOGEN, URINE: 0.2 EU/DL (ref 0–1)

## 2022-11-08 PROCEDURE — G8417 CALC BMI ABV UP PARAM F/U: HCPCS | Performed by: UROLOGY

## 2022-11-08 PROCEDURE — G8484 FLU IMMUNIZE NO ADMIN: HCPCS | Performed by: UROLOGY

## 2022-11-08 PROCEDURE — 99214 OFFICE O/P EST MOD 30 MIN: CPT | Performed by: UROLOGY

## 2022-11-08 PROCEDURE — 1123F ACP DISCUSS/DSCN MKR DOCD: CPT | Performed by: UROLOGY

## 2022-11-08 PROCEDURE — G8427 DOCREV CUR MEDS BY ELIG CLIN: HCPCS | Performed by: UROLOGY

## 2022-11-08 PROCEDURE — 1036F TOBACCO NON-USER: CPT | Performed by: UROLOGY

## 2022-11-08 PROCEDURE — 81003 URINALYSIS AUTO W/O SCOPE: CPT | Performed by: UROLOGY

## 2022-11-08 RX ORDER — NITROFURANTOIN 25; 75 MG/1; MG/1
100 CAPSULE ORAL 2 TIMES DAILY
Qty: 14 CAPSULE | Refills: 0 | Status: SHIPPED | OUTPATIENT
Start: 2022-11-08 | End: 2022-11-15

## 2022-11-08 NOTE — PROGRESS NOTES
MD MD [facility] Consultation / New Patient Visit    Patient:  Chel Burns  YOB: 1945  Date: 11/8/2022  Consult requested from Joel Ang MD     HISTORY OF PRESENT ILLNESS:   The patient is a 65 y.o. male who presents today for follow-up for the following problem(s): history of bladder cancer, BPH with LUTs  Overall the problem(s) : are worsening. Associated Symptoms: No dysuria, gross hematuria. Pain Severity:      Today visit:   11/8/22    S/p Greenlight PVP (8/26/22) for BPH (large gland) , post op urinary retention. Still taking Flomax (0.8 mg). Summary of old records:   (Patient's old records, notes and chart reviewed and summarized above.)    Sp Cysto bladder biopsy  Bladder, biopsy:             Benign urothelial mucosa with von Brunn's nests. Negative for malignancy. Cystoscopy Operative Note  Surgeon: Al Rider MD   Anesthesia: Urethral 2%  Indications: bladder cancer  Position: supine  Findings:   The patient was prepped and draped in the usual sterile fashion. The flexible cystoscope was advanced through the urethra and into the bladder. The bladder was thoroughly inspected and the following was noted:    Summary of old records:   (Patient's old records, notes and chart reviewed and summarized above.)    Residual Urine:  Significant  Urethra: No abnormalities of the urethra are noted. Prostate: Large gland Complete obstruction by lateral & small median lobe of prostate. Bladder: No tumors or CIS noted. No bladder diverticulum. Severe trabeculation noted. Ureters: Clear efflux from both ureters. Orifices with normal configuration and location. The cystoscope was removed. The patient tolerated the procedure well.   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 He is on Flomax and Marlen Cromwell and Praful with respectable improvement although not completely satisfied. He feels he does not empty his bladder completely. He has nocturia x2.  UA today: WNL  PVR today: 105 mL.     Last several PSA's:  Lab Results   Component Value Date    PSA 0.8 05/19/2016    PSA 0.7 10/06/2015    PSA 0.8 12/16/2014       Last total testosterone:  No results found for: TESTOSTERONE    Urinalysis today:  Results for POC orders placed in visit on 11/08/22   POCT Urinalysis No Micro (Auto)   Result Value Ref Range    Glucose, Ur Negative NEGATIVE mg/dl    Bilirubin Urine Negative     Ketones, Urine Negative NEGATIVE    Specific Gravity, Urine 1.025 1.002 - 1.030    Blood, UA POC Trace-intact NEGATIVE    pH, Urine 5.50 5.0 - 9.0    Protein, Urine 30 (A) NEGATIVE mg/dl    Urobilinogen, Urine 0.20 0.0 - 1.0 eu/dl    Nitrite, Urine Negative NEGATIVE    Leukocyte Clumps, Urine Small (A) NEGATIVE    Color, Urine Yellow YELLOW-STRAW    Character, Urine Clear [id number].CLOUD         Last BUN and creatinine:  Lab Results   Component Value Date    BUN 21 09/04/2022     Lab Results   Component Value Date    CREATININE 1.0 09/04/2022       Imaging Reviewed during this Office Visit:   (results were independently reviewed by physician and radiology report verified)    PAST MEDICAL, FAMILY AND SOCIAL HISTORY:  Past Medical History:   Diagnosis Date    Arthritis     CAD (coronary artery disease)     Chronic obstructive pulmonary disease ([facility] [place] [address] 9/17/2019    GERD (gastroesophageal reflux disease)     Hyperglycemia 3/18/2016    Hyperlipidemia     Hypertension     IBS (irritable bowel syndrome)     Kidney stones     Malignant neoplasm of trigone of urinary bladder (HCC)     Obstructive sleep apnea     wears cpap    Prostatitis      Past Surgical History:   Procedure Laterality Date    ABDOMEN SURGERY      BACK SURGERY 07/08/2013    Lumbar Coyle L2-S-1, revision, PLIF L5-S-1 w/ grafting    CARDIAC CATHETERIZATION  9/2007    COLON SURGERY  2008    Dr. Kareen Gonsalves  2011?     Dr. Ashley Carrera  2007    CYSTOSCOPY N/A 2/28/2020    CYSTOSCOPY TRANSURETHRAL RESECTION BLADDER TUMOR performed by Michelle Ibarra MD at [facility] N/A 10/4/2021    CYSTOSCOPY, BLADDER BIOPSY WITH FULGURATION performed by Bethany Daniel MD at [address] [place] [address], ESOPHAGUS      ENDOSCOPY, [facility]  2005    Dr. Thomas Yeboah Right 3/25/2014    right total hip    OTHER SURGICAL HISTORY  08/25/2016    TURBT by Dr Gayathri Schaefer    TURP N/A 8/26/2022    CYSTO WITH GREENLIGHT PHOTOVAPORIZATION OF THE PROSTATE performed by Bethany Daniel MD at [facility] History   Problem Relation Age of Onset    Heart Disease Mother     Diabetes Mother     Kidney Disease Father     Cancer Brother         colon     Outpatient Medications Marked as Taking for the 11/8/22 encounter (Office Visit) with Bethany Daniel MD   Medication Sig Dispense Refill    tamsulosin (FLOMAX) 0.4 MG capsule Take 2 capsules by mouth in the morning and at bedtime 180 capsule 3    hyoscyamine (NULEV) 125 MCG TBDP dispersible tablet Take 1 tablet by mouth every 4 hours as needed (abd pain) 360 tablet 1    atorvastatin (LIPITOR) 40 MG tablet Take 1 tablet by mouth nightly 90 tablet 3    dicyclomine (BENTYL) 20 MG tablet Take 1 tablet by mouth 2 times daily 180 tablet 3    metoprolol tartrate (LOPRESSOR) 25 MG tablet Take 1 tablet by mouth daily 90 tablet 2    omeprazole (PRILOSEC) 20 MG delayed release capsule Take one tablet by mouth daily 90 capsule 3    lisinopril (PRINIVIL;ZESTRIL) 10 MG tablet Take 1 tablet by mouth daily 90 tablet 3    Calcium Polycarbophil (FIBER-CAPS PO) Take 2 tablets by mouth daily      NONFORMULARY as needed Diltiazem 2% gel apply to rectal fissure daily as directed      aspirin 81 MG EC tablet Take 81 mg by mouth daily      Multiple Vitamins-Minerals (PRESERVISION AREDS) TABS Take by mouth 2 times daily       UNABLE TO FIND Apple cider vinegar 625mg daily      NONFORMULARY Take 450 mg by mouth 2 times daily      nitroGLYCERIN (NITROSTAT) 0.4 MG SL tablet Place 1 tablet under the tongue every 5 minutes as needed for Chest pain 25 tablet 5    Cinnamon 500 MG CAPS Take 500 mg by mouth Daily      Milk Thistle 1000 MG CAPS Take 1,000 mg by mouth Daily      Ubiquinol 100 MG CAPS Take 1 tablet by mouth daily. Bilberry 100 MG CAPS Take 100 mg by mouth Daily       fish oil-omega-3 fatty acids 1000 MG capsule Take 1 g by mouth daily. Chromium Picolinate 500 MCG TABS Take 500 mg by mouth daily. Vitamin E 400 UNIT TABS Take 400 mg by mouth daily. Ascorbic Acid  MG TBCR Take 500 mg by mouth daily. Methylsulfonylmethane (MSM) 1500 MG TABS Take 1,500 mg by mouth daily. Lycopene 10 MG CAPS Take by mouth daily 2 cap      Multiple Vitamin (MULTIVITAMIN PO) Take 1 tablet by mouth daily. Cholecalciferol (VITAMIN D3) 2000 UNIT TABS Take 2,000 mg by mouth daily. Pyridoxine HCl (VITAMIN B-6) 50 MG tablet Take 50 mg by mouth 2 times daily. Vitamins-Lipotropics (BALANCED B-50 COMPLEX PO) Take  by mouth 2 times daily.            Avodart [dutasteride] and Myrbetriq [mirabegron]  Social History     Tobacco Use   Smoking Status Former    Packs/day: 1.00    Years: 25.00    Pack years: 25.00    Types: Cigarettes    Quit date: 1995    Years since quittin.8   Smokeless Tobacco Former       Social History     Substance and Sexual Activity   Alcohol Use Yes    Alcohol/week: 1.0 standard drink    Types: 1 Cans of beer per week    Comment: seldom       REVIEW OF SYSTEMS:  Constitutional: negative  Eyes: negative  Respiratory: negative  Cardiovascular: negative  Gastrointestinal: negative  Musculoskeletal: negative  Genitourinary: negative  Skin: negative   Neurological: negative  Hematological/Lymphatic: negative  Psychological: negative    Physical Exam:    This a 65 y.o. male   Vitals:    11/08/22 1259   Resp: 20     Constitutional: Patient in no acute distress   Neuro: alert and oriented to person place and time. Psych: Mood and affect normal.  Head: atraumatic normocephalic  Eyes: EOMi  HEENT: neck supple, trachea midline  Lungs: Respiratory effort normal  Cardiovascular:  Normal peripheral pulses  Abdomen: Soft, non-tender, non-distended, No CVA  Bladder: non-tender and not distended. FROMx4, no cyanosis clubbing edema  Skin: warm and dry      Assessment and Plan      1. BPH with obstruction/lower urinary tract symptoms             Plan:      No follow-ups on file. Greenlight PVP for BPH with LUTs, persistent irritative symptoms.    Abx for suspected UTI  Cysto to evaluate for persistent symtpos

## 2022-11-11 LAB
ORGANISM: ABNORMAL
URINE CULTURE, ROUTINE: ABNORMAL
URINE CULTURE, ROUTINE: ABNORMAL

## 2022-11-18 ENCOUNTER — PROCEDURE VISIT (OUTPATIENT)
Dept: UROLOGY | Age: 77
End: 2022-11-18
Payer: MEDICARE

## 2022-11-18 VITALS — WEIGHT: 229 LBS | BODY MASS INDEX: 31.02 KG/M2 | RESPIRATION RATE: 18 BRPM | HEIGHT: 72 IN

## 2022-11-18 DIAGNOSIS — N40.1 BPH WITH OBSTRUCTION/LOWER URINARY TRACT SYMPTOMS: Primary | ICD-10-CM

## 2022-11-18 DIAGNOSIS — N13.8 BPH WITH OBSTRUCTION/LOWER URINARY TRACT SYMPTOMS: Primary | ICD-10-CM

## 2022-11-18 PROCEDURE — G8484 FLU IMMUNIZE NO ADMIN: HCPCS | Performed by: UROLOGY

## 2022-11-18 PROCEDURE — G8427 DOCREV CUR MEDS BY ELIG CLIN: HCPCS | Performed by: UROLOGY

## 2022-11-18 PROCEDURE — 1123F ACP DISCUSS/DSCN MKR DOCD: CPT | Performed by: UROLOGY

## 2022-11-18 PROCEDURE — G8417 CALC BMI ABV UP PARAM F/U: HCPCS | Performed by: UROLOGY

## 2022-11-18 PROCEDURE — 1036F TOBACCO NON-USER: CPT | Performed by: UROLOGY

## 2022-11-18 PROCEDURE — 99213 OFFICE O/P EST LOW 20 MIN: CPT | Performed by: UROLOGY

## 2022-11-18 PROCEDURE — 52000 CYSTOURETHROSCOPY: CPT | Performed by: UROLOGY

## 2022-11-18 RX ORDER — DOXYCYCLINE HYCLATE 100 MG
100 TABLET ORAL 2 TIMES DAILY
Qty: 10 TABLET | Refills: 0 | Status: SHIPPED | OUTPATIENT
Start: 2022-11-18 | End: 2022-11-23

## 2022-11-18 NOTE — PROGRESS NOTES
Cystoscopy Operative Note  Surgeon: Jl Arroyo MD   Anesthesia: Urethral 2%  Indications: irritative sytmposm  Position: supine  Findings:   The patient was prepped and draped in the usual sterile fashion. The flexible cystoscope was advanced through the urethra and into the bladder. The bladder was thoroughly inspected and the following was noted:    Residual Urine: Moderate   Urethra: No abnormalities of the urethra are noted. Prostate: Patent prostate fossa  Bladder: No tumors or CIS noted. No bladder diverticulum. Moderate  trabeculation noted. Ureters: Clear efflux from both ureters. Orifices with normal configuration and location. The cystoscope was removed. The patient tolerated the procedure well.   Follow up 3 months with PVR

## 2022-12-01 LAB
ABSOLUTE BASO #: 0.05 K/UL (ref 0–0.2)
ABSOLUTE EOS #: 0.26 K/UL (ref 0–0.5)
ABSOLUTE LYMPH #: 2.64 K/UL (ref 1–4)
ABSOLUTE MONO #: 0.68 K/UL (ref 0.2–1)
ABSOLUTE NEUT #: 3.55 K/UL (ref 1.5–7.5)
ALBUMIN SERPL-MCNC: 4.4 G/DL (ref 3.5–5.2)
ALK PHOSPHATASE: 84 U/L (ref 40–125)
ALT SERPL-CCNC: 27 U/L (ref 5–50)
ANION GAP SERPL CALCULATED.3IONS-SCNC: 7 MEQ/L (ref 7–16)
AST SERPL-CCNC: 32 U/L (ref 9–50)
AVERAGE GLUCOSE: 123 MG/DL
BASOPHILS RELATIVE PERCENT: 0.7 %
BILIRUB SERPL-MCNC: 1.7 MG/DL
BUN BLDV-MCNC: 20 MG/DL (ref 8–23)
CALCIUM SERPL-MCNC: 9 MG/DL (ref 8.5–10.5)
CHLORIDE BLD-SCNC: 106 MEQ/L (ref 95–107)
CHOLESTEROL/HDL RATIO: 2.3 RATIO
CHOLESTEROL: 136 MG/DL
CO2: 27 MEQ/L (ref 19–31)
CREAT SERPL-MCNC: 0.96 MG/DL (ref 0.8–1.4)
EGFR IF NONAFRICAN AMERICAN: 81 ML/MIN/1.73
EOSINOPHILS RELATIVE PERCENT: 3.6 %
GLUCOSE: 123 MG/DL (ref 70–99)
HBA1C MFR BLD: 5.9 % (ref 4.2–5.6)
HCT VFR BLD CALC: 39.1 % (ref 40–51)
HDLC SERPL-MCNC: 59 MG/DL
HEMOGLOBIN: 13.2 G/DL (ref 13.5–17)
LDL CHOLESTEROL CALCULATED: 60 MG/DL
LDL/HDL RATIO: 1 RATIO
LYMPHOCYTE %: 36.6 %
MCH RBC QN AUTO: 30.6 PG (ref 25–33)
MCHC RBC AUTO-ENTMCNC: 33.8 G/DL (ref 31–36)
MCV RBC AUTO: 90.5 FL (ref 80–99)
MONOCYTES # BLD: 9.4 %
NEUTROPHILS RELATIVE PERCENT: 49.3 %
PDW BLD-RTO: 12 % (ref 11.5–15)
PLATELETS: 178 K/UL (ref 130–400)
PMV BLD AUTO: 11.3 FL (ref 9.3–13)
POTASSIUM SERPL-SCNC: 4.1 MEQ/L (ref 3.5–5.4)
RBC: 4.32 M/UL (ref 4.5–6.1)
SODIUM BLD-SCNC: 140 MEQ/L (ref 133–146)
TOTAL PROTEIN: 6.3 G/DL (ref 6.1–8.3)
TRIGL SERPL-MCNC: 87 MG/DL
VLDLC SERPL CALC-MCNC: 17 MG/DL
WBC: 7.2 K/UL (ref 3.5–11)

## 2022-12-13 ENCOUNTER — OFFICE VISIT (OUTPATIENT)
Dept: FAMILY MEDICINE CLINIC | Age: 77
End: 2022-12-13

## 2022-12-13 VITALS
HEART RATE: 60 BPM | OXYGEN SATURATION: 97 % | TEMPERATURE: 97.8 F | DIASTOLIC BLOOD PRESSURE: 68 MMHG | BODY MASS INDEX: 30.88 KG/M2 | WEIGHT: 227.7 LBS | RESPIRATION RATE: 16 BRPM | SYSTOLIC BLOOD PRESSURE: 122 MMHG

## 2022-12-13 DIAGNOSIS — I10 ESSENTIAL HYPERTENSION, BENIGN: Primary | ICD-10-CM

## 2022-12-13 DIAGNOSIS — Z00.00 ROUTINE GENERAL MEDICAL EXAMINATION AT A HEALTH CARE FACILITY: ICD-10-CM

## 2022-12-13 DIAGNOSIS — E78.5 HYPERLIPIDEMIA, UNSPECIFIED HYPERLIPIDEMIA TYPE: ICD-10-CM

## 2022-12-13 DIAGNOSIS — J06.9 VIRAL URI: ICD-10-CM

## 2022-12-13 RX ORDER — DEXTROSE 3.75 G
1 TABLET,CHEWABLE ORAL DAILY
Qty: 50 EACH | Refills: 3 | Status: SHIPPED | OUTPATIENT
Start: 2022-12-13

## 2022-12-13 RX ORDER — LISINOPRIL 10 MG/1
10 TABLET ORAL DAILY
Qty: 90 TABLET | Refills: 3 | Status: SHIPPED | OUTPATIENT
Start: 2022-12-13

## 2022-12-13 RX ORDER — OMEPRAZOLE 20 MG/1
CAPSULE, DELAYED RELEASE ORAL
Qty: 90 CAPSULE | Refills: 3 | Status: SHIPPED | OUTPATIENT
Start: 2022-12-13

## 2022-12-13 SDOH — ECONOMIC STABILITY: FOOD INSECURITY: WITHIN THE PAST 12 MONTHS, YOU WORRIED THAT YOUR FOOD WOULD RUN OUT BEFORE YOU GOT MONEY TO BUY MORE.: NEVER TRUE

## 2022-12-13 SDOH — ECONOMIC STABILITY: FOOD INSECURITY: WITHIN THE PAST 12 MONTHS, THE FOOD YOU BOUGHT JUST DIDN'T LAST AND YOU DIDN'T HAVE MONEY TO GET MORE.: NEVER TRUE

## 2022-12-13 ASSESSMENT — SOCIAL DETERMINANTS OF HEALTH (SDOH): HOW HARD IS IT FOR YOU TO PAY FOR THE VERY BASICS LIKE FOOD, HOUSING, MEDICAL CARE, AND HEATING?: NOT HARD AT ALL

## 2022-12-16 NOTE — ED PROVIDER NOTES
[facility] Emergency Department    CHIEF COMPLAINT       Chief Complaint   Patient presents with    Urinary Retention       Nurses Notes reviewed and I agree except as noted in the HPI. HISTORY OF PRESENT ILLNESS    Laurel Martinez deangelo 67 y.o. male who presents to the ED for evaluation of urinary retention. The patient had a green light procedure done a week ago. He had a mohan for three days and has been voiding well until today. Today he has not been able to void for most of the day. No fever. HPI was provided by the patient    REVIEW OF SYSTEMS     Review of Systems   Constitutional:  Negative for chills, fatigue and fever. HENT:  Negative for congestion, ear discharge, ear pain, postnasal drip and rhinorrhea. Eyes:  Negative for redness. Respiratory:  Negative for cough and chest tightness. Cardiovascular:  Negative for chest pain and leg swelling. Gastrointestinal:  Negative for abdominal pain, nausea and vomiting. Genitourinary:  Positive for difficulty urinating. Negative for dysuria, enuresis, flank pain and hematuria. Musculoskeletal:  Negative for back pain and joint swelling. Skin:  Negative for rash. Neurological:  Negative for dizziness, light-headedness, numbness and headaches. Psychiatric/Behavioral:  Negative for agitation, behavioral problems and confusion. All other systems negative except as noted. PAST MEDICAL HISTORY     Past Medical History:   Diagnosis Date    Arthritis     CAD (coronary artery disease)     Chronic obstructive pulmonary disease ([facility] [address] 9/17/2019    GERD (gastroesophageal reflux disease)     Hyperglycemia 3/18/2016    Hyperlipidemia     Hypertension     IBS (irritable bowel syndrome)     Kidney stones     Malignant neoplasm of trigone of urinary bladder (HCC)     Obstructive sleep apnea     wears cpap    Prostatitis        SURGICALHISTORY      has a past surgical history that includes hernia repair (2005);  Colon surgery every 5 minutes as needed for Chest pain, Disp-25 tablet, R-5, DAWPrint      Cinnamon 500 MG CAPS Take 500 mg by mouth DailyHistorical Med      Milk Thistle 1000 MG CAPS Take 1,000 mg by mouth DailyHistorical Med      Ubiquinol 100 MG CAPS Take 1 tablet by mouth daily. Historical Med      Bilberry 100 MG CAPS Take 100 mg by mouth Daily Historical Med      fish oil-omega-3 fatty acids 1000 MG capsule Take 1 g by mouth daily. Historical Med      Chromium Picolinate 500 MCG TABS Take 500 mg by mouth daily. Historical Med      Vitamin E 400 UNIT TABS Take 400 mg by mouth daily. Historical Med      Ascorbic Acid  MG TBCR Take 500 mg by mouth daily. Historical Med      Methylsulfonylmethane (MSM) 1500 MG TABS Take 1,500 mg by mouth daily. Historical Med      Lycopene 10 MG CAPS Take by mouth daily 2 capHistorical Med      Multiple Vitamin (MULTIVITAMIN PO) Take 1 tablet by mouth daily. Historical Med      Cholecalciferol (VITAMIN D3) 2000 UNIT TABS Take 2,000 mg by mouth daily. Historical Med      Pyridoxine HCl (VITAMIN B-6) 50 MG tablet Take 50 mg by mouth 2 times daily. Historical Med      Vitamins-Lipotropics (BALANCED B-50 COMPLEX PO) Take  by mouth 2 times daily. Historical Med       !! - Potential duplicate medications found. Please discuss with provider. ALLERGIES     is allergic to avodart [dutasteride] and myrbetriq [mirabegron]. FAMILY HISTORY     He indicated that his mother is . He indicated that his father is . He indicated that his brother is . family history includes Cancer in his brother; Diabetes in his mother; Heart Disease in his mother; Kidney Disease in his father.     SOCIAL HISTORY       Social History     Socioeconomic History    Marital status:      Spouse name: Not on file    Number of children: Not on file    Years of education: Not on file    Highest education level: Not on file   Occupational History     Employer: [address] [place] [address] Tobacco Use    Smoking status: Former     Packs/day: 1.00     Years: 25.00     Pack years: 25.00     Types: Cigarettes     Quit date: 1995     Years since quittin.6    Smokeless tobacco: Former   Vaping Use    Vaping Use: Not on file   Substance and Sexual Activity    Alcohol use: Yes     Alcohol/week: 1.0 standard drink     Types: 1 Cans of beer per week     Comment: seldom    Drug use: No    Sexual activity: Not Currently     Partners: Female   Other Topics Concern    Not on file   Social History Narrative    Not on file     Social Determinants of Health     Financial Resource Strain: Low Risk     Difficulty of Paying Living Expenses: Not hard at all   Food Insecurity: No Food Insecurity    Worried About Running Out of Food in the Last Year: Never true    Ran Out of Food in the Last Year: Never true   Transportation Needs: Not on file   Physical Activity: Inactive    Days of Exercise per Week: 3 days    Minutes of Exercise per Session: 0 min   Stress: Not on file   Social Connections: Not on file   Intimate Partner Violence: Not on file   Housing Stability: Not on file       PHYSICAL EXAM     INITIAL VITALS:  weight is 230 lb (104.3 kg). His oral temperature is 97.9 °F (36.6 °C). His blood pressure is 160/94 (abnormal) and his pulse is 73. His respiration is 18 and oxygen saturation is 97%. Physical Exam  Constitutional:       Appearance: Normal appearance. He is well-developed. He is not ill-appearing. HENT:      Head: Normocephalic and atraumatic. Nose: Nose normal.      Mouth/Throat:      Mouth: Mucous membranes are moist.      Pharynx: Oropharynx is clear. Eyes:      Conjunctiva/sclera: Conjunctivae normal.   Cardiovascular:      Rate and Rhythm: Normal rate. Pulses: Normal pulses. Pulmonary:      Effort: Pulmonary effort is normal.   Abdominal:      Palpations: Abdomen is soft. Musculoskeletal:         General: Normal range of motion. Cervical back: Normal range of motion. Skin:     General: Skin is warm and dry. Capillary Refill: Capillary refill takes less than 2 seconds. Neurological:      General: No focal deficit present. Mental Status: He is alert and oriented to person, place, and time. Psychiatric:         Mood and Affect: Mood normal.         Behavior: Behavior normal.       DIFFERENTIAL DIAGNOSIS:   Urinary retention, uti, hematuria    DIAGNOSTIC RESULTS     EKG: All EKG's are interpreted by the Emergency Department Physician who eithersigns or Co-signs this chart in the absence of a cardiologist.        RADIOLOGY: non-plainfilm images(s) such as CT, Ultrasound and MRI are read by the radiologist.  Plain radiographic images are visualized and preliminarily interpreted by the emergency physician unless otherwise stated below. No orders to display         LABS:   Labs Reviewed   CULTURE, REFLEXED, URINE - Abnormal; Notable for the following components:       Result Value    Urine Culture Reflex   (*)     Value: Growth of Contaminants. The mixture of organisms present are not a common cause of urinary tract infections and probably represent skin micheline or distal urethral micheline.      Organism Growth of Contaminants (*)     All other components within normal limits    Narrative:     Source: urine, clean catch       Site: clean void          Current Antibiotics: none   CBC WITH AUTO DIFFERENTIAL - Abnormal; Notable for the following components:    WBC 13.8 (*)     RBC 4.21 (*)     Hemoglobin 13.2 (*)     Hematocrit 38.4 (*)     Segs Absolute 11.2 (*)     Immature Grans (Abs) 0.11 (*)     All other components within normal limits   BASIC METABOLIC PANEL - Abnormal; Notable for the following components:    Glucose 123 (*)     All other components within normal limits   URINE WITH REFLEXED MICRO - Abnormal; Notable for the following components:    Blood, Urine LARGE (*)     Protein, UA TRACE (*)     Leukocyte Esterase, Urine SMALL (*)     Color, UA DK YELLOW (*) All other components within normal limits   GLOMERULAR FILTRATION RATE, ESTIMATED - Abnormal; Notable for the following components:    Est, Glom Filt Rate 72 (*)     All other components within normal limits   ANION GAP   OSMOLALITY       EMERGENCY DEPARTMENT COURSE:   Vitals:    Vitals:    09/04/22 0010 09/04/22 0148   BP: (!) 143/83 (!) 160/94   Pulse: 83 73   Resp: 18 18   Temp: 97.9 °F (36.6 °C)    TempSrc: Oral    SpO2: 97% 97%   Weight: 230 lb (104.3 kg)                                         Internal Administration   First Dose COVID-19, MODERNA BLUE border, Primary or Immunocompromised, (age 12y+), IM, 100 mcg/0.5mL  01/28/2021   Second Dose COVID-19, MODERNA BLUE border, Primary or Immunocompromised, (age 12y+), IM, 100 mcg/0.5mL   02/23/2021       Last COVID Lab No results found for: SARS-COV-2, SARS-COV-2 RNA, SARS-COV-2, SARS-COV-2, SARS-COV-2 BY PCR, SARS-COV-2, SARS-COV-2, SARS-COV-2         MDM      Patient was seen in the ER for acute urinary retention. Appropriate testing is ordered and reviewed. Mohan is placed by nursing staff and patient was able to have relief. Labs are reassuring. No ALESHA. Patient is discharged home with mohan in place. Instructed to follow up with urology. No notes of EC Admission Criteria type on file. Medications - No data to display    Please note that the patient was evaluated during a pandemic. All efforts were made for HIPPA compliance as well as provision of appropriate care. Patient was seen independently by myself. The patient's final impression and disposition and plan was determined by myself. Strict return precautions and follow up instructions were discussed with the patient prior to discharge, with which the patient agrees. Physical assessment findings, diagnostic testing(s) if applicable, and vital signs reviewed with patient/patient representative. Questions answered.    Medications asdirected, including OTC medications for English

## 2022-12-18 ENCOUNTER — PATIENT MESSAGE (OUTPATIENT)
Dept: FAMILY MEDICINE CLINIC | Age: 77
End: 2022-12-18

## 2022-12-18 ASSESSMENT — ENCOUNTER SYMPTOMS
RHINORRHEA: 0
SHORTNESS OF BREATH: 0
COUGH: 1
CONSTIPATION: 0
SINUS PRESSURE: 0
EYE PAIN: 0
ABDOMINAL DISTENTION: 0
ABDOMINAL PAIN: 0
DIARRHEA: 0
SORE THROAT: 0
NAUSEA: 0

## 2022-12-18 NOTE — PROGRESS NOTES
[address] [place] [facility] [address]  Dept: [phone]  Dept Fax: [phone]  Loc: [phone]  PROGRESS NOTE      VisitDate: 12/13/2022    Lico Mittal is a 65 y.o. male who presents today for:     Chief Complaint   Patient presents with    6 Month Follow-Up     HTN, hyperlipidemia, BPH, discuss labs    Cough     x1.5wks, productive cough with yellow phlegm-better this week, nose was runny, took mucinex without relief, denies sob or fever         Subjective:  HPI  Patient comes in follow-up hypertension stable and well-controlled. Follow-up hyperlipidemia stable well-controlled. History of BPH he had laser ablation but does not feel he is got much relief. He does notice cough for a week and a half bringing up yellow phlegm without shortness of breath fever body aches. Review of Systems   Constitutional:  Negative for appetite change and fever. HENT:  Positive for congestion. Negative for ear pain, postnasal drip, rhinorrhea, sinus pressure and sore throat. Eyes:  Negative for pain and visual disturbance. Respiratory:  Positive for cough. Negative for shortness of breath. Cardiovascular:  Negative for chest pain. Gastrointestinal:  Negative for abdominal distention, abdominal pain, constipation, diarrhea and nausea. Genitourinary:  Negative for dysuria, frequency and urgency. Musculoskeletal:  Negative for arthralgias. Skin:  Negative for rash. Neurological:  Negative for dizziness.    Past Medical History:   Diagnosis Date    Arthritis     CAD (coronary artery disease)     Chronic obstructive pulmonary disease ([facility] [address] 9/17/2019    GERD (gastroesophageal reflux disease)     Hyperglycemia 3/18/2016    Hyperlipidemia     Hypertension     IBS (irritable bowel syndrome)     Kidney stones     Malignant neoplasm of trigone of urinary bladder (HCC)     Obstructive sleep apnea     wears cpap    Prostatitis       Past Surgical History:   Procedure Laterality Date    ABDOMEN SURGERY      BACK SURGERY  2013    Lumbar Coyle L2-S-1, revision, PLIF L5-S-1 w/ grafting    CARDIAC CATHETERIZATION  2007    COLON SURGERY      Dr. Bessy Eubanks  ? Dr. Julio Callahan      CYSTOSCOPY N/A 2020    CYSTOSCOPY TRANSURETHRAL RESECTION BLADDER TUMOR performed by Naina La MD at [address] [facility] N/A 10/4/2021    CYSTOSCOPY, BLADDER BIOPSY WITH FULGURATION performed by Nba Nath MD at [facility]      Dr. Sahra Carrasquillo Right 3/25/2014    right total hip    OTHER SURGICAL HISTORY  2016    TURBT by Dr Jason NUR N/A 2022    CYSTO WITH GREENLIGHT PHOTOVAPORIZATION OF THE PROSTATE performed by Nba Nath MD at [address] History   Problem Relation Age of Onset    Heart Disease Mother     Diabetes Mother     Kidney Disease Father     Cancer Brother         colon     Social History     Tobacco Use    Smoking status: Former     Packs/day: 1.00     Years: 25.00     Pack years: 25.00     Types: Cigarettes     Quit date: 1995     Years since quittin.9    Smokeless tobacco: Former   Substance Use Topics    Alcohol use: Yes     Alcohol/week: 1.0 standard drink     Types: 1 Cans of beer per week     Comment: seldom      Current Outpatient Medications   Medication Sig Dispense Refill    metoprolol tartrate (LOPRESSOR) 25 MG tablet Take 1 tablet by mouth daily 90 tablet 3    omeprazole (PRILOSEC) 20 MG delayed release capsule Take one tablet by mouth daily 90 capsule 3    lisinopril (PRINIVIL;ZESTRIL) 10 MG tablet Take 1 tablet by mouth daily 90 tablet 3    blood glucose test strips (GNP TRUE METRIX GLUCOSE STRIPS) strip 1 each by In Vitro route daily As needed.  50 each 3    blood glucose monitor kit and supplies Dispense all needed supplies to include: control solutions 1 kit 0    tamsulosin (FLOMAX) 0.4 MG capsule Take 2 capsules by mouth in the morning and at bedtime 180 capsule 3    hyoscyamine (NULEV) 125 MCG TBDP dispersible tablet Take 1 tablet by mouth every 4 hours as needed (abd pain) 360 tablet 1    atorvastatin (LIPITOR) 40 MG tablet Take 1 tablet by mouth nightly 90 tablet 3    dicyclomine (BENTYL) 20 MG tablet Take 1 tablet by mouth 2 times daily 180 tablet 3    Calcium Polycarbophil (FIBER-CAPS PO) Take 2 tablets by mouth daily      NONFORMULARY as needed Diltiazem 2% gel apply to rectal fissure daily as directed      aspirin 81 MG EC tablet Take 81 mg by mouth daily      Multiple Vitamins-Minerals (PRESERVISION AREDS) TABS Take by mouth 2 times daily      UNABLE TO FIND Apple cider vinegar 625mg daily      NONFORMULARY Take 450 mg by mouth 2 times daily Indications: Pia for prostate      nitroGLYCERIN (NITROSTAT) 0.4 MG SL tablet Place 1 tablet under the tongue every 5 minutes as needed for Chest pain 25 tablet 5    Cinnamon 500 MG CAPS Take 500 mg by mouth Daily      Milk Thistle 1000 MG CAPS Take 1,000 mg by mouth Daily      Ubiquinol 100 MG CAPS Take 1 tablet by mouth daily.       Bilberry 100 MG CAPS Take 100 mg by mouth Daily      fish oil-omega-3 fatty acids 1000 MG capsule Take 1 g by mouth daily      Chromium Picolinate 500 MCG TABS Take 500 mg by mouth daily      Vitamin E 400 UNIT TABS Take 400 mg by mouth daily      Ascorbic Acid  MG TBCR Take 500 mg by mouth daily      Methylsulfonylmethane (MSM) 1500 MG TABS Take 1,500 mg by mouth daily      Lycopene 10 MG CAPS Take by mouth daily 2 cap      Multiple Vitamin (MULTIVITAMIN PO) Take 1 tablet by mouth daily      Cholecalciferol (VITAMIN D3) 2000 UNIT TABS Take 2,000 mg by mouth daily      Pyridoxine HCl (VITAMIN B-6) 50 MG tablet Take 50 mg by mouth 2 times daily      Vitamins-Lipotropics (BALANCED B-50 COMPLEX PO) Take by mouth 2 times daily No current facility-administered medications for this visit. Allergies   Allergen Reactions    Avodart [Dutasteride] Other (See Comments)     Chest tightness    Myrbetriq [Mirabegron] Other (See Comments)     Red spots     Health Maintenance   Topic Date Due    Hepatitis C screen  Never done    DTaP/Tdap/Td vaccine (1 - Tdap) 03/20/2014    Depression Screen  05/26/2023    Annual Wellness Visit (AWV)  06/10/2023    Lipids  12/01/2023    Flu vaccine  Completed    Shingles vaccine  Completed    Pneumococcal 65+ years Vaccine  Completed    COVID-19 Vaccine  Completed    Hepatitis A vaccine  Aged Out    Hib vaccine  Aged Out    Meningococcal (ACWY) vaccine  Aged Out         Objective:     Physical Exam  Constitutional:       General: He is not in acute distress. Appearance: He is well-developed. He is not diaphoretic. HENT:      Head: Normocephalic and atraumatic. Right Ear: External ear normal.      Left Ear: External ear normal.   Eyes:      Conjunctiva/sclera: Conjunctivae normal.   Neck:      Vascular: No JVD. Cardiovascular:      Rate and Rhythm: Normal rate and regular rhythm. Heart sounds: Normal heart sounds. Pulmonary:      Effort: Pulmonary effort is normal.      Breath sounds: Normal breath sounds. No wheezing or rales. Musculoskeletal:         General: No tenderness. Skin:     General: Skin is warm and dry. Coloration: Skin is not pale. Neurological:      Mental Status: He is alert and oriented to person, place, and time. /68 (Site: Left Upper Arm)   Pulse 60   Temp 97.8 °F (36.6 °C) (Oral)   Resp 16   Wt 227 lb 11.2 oz (103.3 kg)   SpO2 97%   BMI 30.88 kg/m²       Impression/Plan:  1. Essential hypertension, benign    2. Viral URI    3. Hyperlipidemia, unspecified hyperlipidemia type    4.  Routine general medical examination at a health care facility      Requested Prescriptions     Signed Prescriptions Disp Refills    metoprolol tartrate (LOPRESSOR) 25 MG tablet 90 tablet 3     Sig: Take 1 tablet by mouth daily    omeprazole (PRILOSEC) 20 MG delayed release capsule 90 capsule 3     Sig: Take one tablet by mouth daily    lisinopril (PRINIVIL;ZESTRIL) 10 MG tablet 90 tablet 3     Sig: Take 1 tablet by mouth daily    blood glucose test strips (GNP TRUE METRIX GLUCOSE STRIPS) strip 50 each 3     Si each by In Vitro route daily As needed. blood glucose monitor kit and supplies 1 kit 0     Sig: Dispense all needed supplies to include: control solutions     Orders Placed This Encounter   Procedures    Lipid Panel     Standing Status:   Future     Standing Expiration Date:   2023     Order Specific Question:   Is Patient Fasting?/# of Hours     Answer:   yes/12    Hemoglobin A1C     Standing Status:   Future     Standing Expiration Date:   2023    Comprehensive Metabolic Panel     Standing Status:   Future     Standing Expiration Date:   2023    CBC with Auto Differential     Standing Status:   Future     Standing Expiration Date:   2023   Over-the-counter medications for cold symptoms    Patient giveneducational materials - see patient instructions. Discussed use, benefit, and side effects of prescribed medications. All patient questions answered. Pt voiced understanding. Reviewed health maintenance. Patient agreedwith treatment plan. Follow up as directed. **This report has been created using voice recognition software. It may contain minor errorswhich are inherent in voice recognition technology. **       Electronically signed by Derik Valero MD on 2022 at 9:31 AM

## 2022-12-19 RX ORDER — AMOXICILLIN 500 MG/1
500 CAPSULE ORAL 2 TIMES DAILY
Qty: 20 CAPSULE | Refills: 0 | Status: SHIPPED | OUTPATIENT
Start: 2022-12-19 | End: 2022-12-29

## 2022-12-19 NOTE — TELEPHONE ENCOUNTER
From: Ann Marie Garcia  To: Dr. Garner Claude: 12/18/2022 8:15 PM EST  Subject: Needed medication    I was in last Thu.for my checkup,when l was there we discussed my congestion,it is not any better, still taking Mucinex, I need something else to check this congestion. Can you call me in something else to take for this continued congestion? Thanks Ajay Cullen on [employer].

## 2023-01-23 ENCOUNTER — OFFICE VISIT (OUTPATIENT)
Dept: CARDIOLOGY CLINIC | Age: 78
End: 2023-01-23
Payer: MEDICARE

## 2023-01-23 VITALS
BODY MASS INDEX: 30.88 KG/M2 | RESPIRATION RATE: 18 BRPM | WEIGHT: 228 LBS | DIASTOLIC BLOOD PRESSURE: 78 MMHG | HEART RATE: 56 BPM | SYSTOLIC BLOOD PRESSURE: 131 MMHG | HEIGHT: 72 IN

## 2023-01-23 DIAGNOSIS — I10 ESSENTIAL HYPERTENSION, BENIGN: Primary | ICD-10-CM

## 2023-01-23 PROCEDURE — G8417 CALC BMI ABV UP PARAM F/U: HCPCS | Performed by: INTERNAL MEDICINE

## 2023-01-23 PROCEDURE — G8427 DOCREV CUR MEDS BY ELIG CLIN: HCPCS | Performed by: INTERNAL MEDICINE

## 2023-01-23 PROCEDURE — 3078F DIAST BP <80 MM HG: CPT | Performed by: INTERNAL MEDICINE

## 2023-01-23 PROCEDURE — G8484 FLU IMMUNIZE NO ADMIN: HCPCS | Performed by: INTERNAL MEDICINE

## 2023-01-23 PROCEDURE — 93000 ELECTROCARDIOGRAM COMPLETE: CPT | Performed by: INTERNAL MEDICINE

## 2023-01-23 PROCEDURE — 3075F SYST BP GE 130 - 139MM HG: CPT | Performed by: INTERNAL MEDICINE

## 2023-01-23 PROCEDURE — 1123F ACP DISCUSS/DSCN MKR DOCD: CPT | Performed by: INTERNAL MEDICINE

## 2023-01-23 PROCEDURE — 1036F TOBACCO NON-USER: CPT | Performed by: INTERNAL MEDICINE

## 2023-01-23 PROCEDURE — 99214 OFFICE O/P EST MOD 30 MIN: CPT | Performed by: INTERNAL MEDICINE

## 2023-01-23 RX ORDER — AZITHROMYCIN 500 MG/1
500 INJECTION, POWDER, LYOPHILIZED, FOR SOLUTION INTRAVENOUS
COMMUNITY

## 2023-01-23 RX ORDER — ATORVASTATIN CALCIUM 40 MG/1
40 TABLET, FILM COATED ORAL NIGHTLY
Qty: 90 TABLET | Refills: 3 | Status: SHIPPED | OUTPATIENT
Start: 2023-01-23

## 2023-01-23 RX ORDER — NITROGLYCERIN 0.4 MG/1
0.4 TABLET SUBLINGUAL EVERY 5 MIN PRN
Qty: 25 TABLET | Refills: 3 | Status: SHIPPED | OUTPATIENT
Start: 2023-01-23

## 2023-01-23 ASSESSMENT — ENCOUNTER SYMPTOMS
COUGH: 0
VOICE CHANGE: 0
APNEA: 0
CHEST TIGHTNESS: 0
VOMITING: 0
ANAL BLEEDING: 0
BLOOD IN STOOL: 0
STRIDOR: 0
TROUBLE SWALLOWING: 0
NAUSEA: 0
ABDOMINAL PAIN: 0
ABDOMINAL DISTENTION: 0
WHEEZING: 0
SHORTNESS OF BREATH: 1
CHOKING: 0
COLOR CHANGE: 0

## 2023-01-23 NOTE — PROGRESS NOTES
[address] [facility]  [place] [address]  Dept: [phone]  Loc: [phone]     1/23/2023       Raman Rodriguez is here today for   Chief Complaint   Patient presents with    Follow-up           Referring Physician:  No ref. provider found     Patient Active Problem List   Diagnosis    Hyperlipidemia    Essential hypertension, benign    Obesity (BMI 30.0-34.9)    Spinal stenosis of lumbar region with neurogenic claudication    Abnormal nuclear cardiac imaging test    Obstructive sleep apnea on CPAP    Primary localized osteoarthrosis, pelvic region and thigh    Anal fissure    Hyperglycemia    Chest pain at rest    Benign prostatic hyperplasia with incomplete bladder emptying    Bladder tumor    Malignant neoplasm of trigone of urinary bladder (HCC)    Malignant neoplasm of posterior wall of urinary bladder (HCC)    BPH with obstruction/lower urinary tract symptoms    Anxiety    Contusion of right foot    History of bladder cancer       Review of Systems   Constitutional:  Negative for activity change, appetite change, fatigue, fever and unexpected weight change. HENT:  Negative for congestion, trouble swallowing and voice change. Eyes:  Negative for visual disturbance. Respiratory:  Positive for shortness of breath. Negative for apnea, cough, choking, chest tightness, wheezing and stridor. Cardiovascular:  Positive for chest pain. Negative for palpitations and leg swelling. Gastrointestinal:  Negative for abdominal distention, abdominal pain, anal bleeding, blood in stool, nausea and vomiting. Endocrine: Negative for cold intolerance and heat intolerance. Genitourinary:  Negative for hematuria. Musculoskeletal:  Negative for arthralgias, gait problem, joint swelling and myalgias. Skin:  Negative for color change and rash.    Allergic/Immunologic: Negative for environmental allergies and food allergies. Neurological:  Negative for dizziness, tremors, syncope, facial asymmetry, weakness, light-headedness, numbness and headaches. Hematological:  Does not bruise/bleed easily. Psychiatric/Behavioral:  Negative for agitation, behavioral problems and sleep disturbance. Past Medical History:   Diagnosis Date    Arthritis     CAD (coronary artery disease)     Chronic obstructive pulmonary disease ([facility] [address] 9/17/2019    GERD (gastroesophageal reflux disease)     Hyperglycemia 3/18/2016    Hyperlipidemia     Hypertension     IBS (irritable bowel syndrome)     Kidney stones     Malignant neoplasm of trigone of urinary bladder (HCC)     Obstructive sleep apnea     wears cpap    Prostatitis        Allergies   Allergen Reactions    Avodart [Dutasteride] Other (See Comments)     Chest tightness    Myrbetriq [Mirabegron] Other (See Comments)     Red spots       Current Outpatient Medications   Medication Sig Dispense Refill    azithromycin (ZITHROMAX) 500 MG injection Infuse 500 mg intravenously      atorvastatin (LIPITOR) 40 MG tablet Take 1 tablet by mouth nightly 90 tablet 3    nitroGLYCERIN (NITROSTAT) 0.4 MG SL tablet Place 1 tablet under the tongue every 5 minutes as needed for Chest pain 25 tablet 3    metoprolol tartrate (LOPRESSOR) 25 MG tablet Take 1 tablet by mouth daily 90 tablet 3    omeprazole (PRILOSEC) 20 MG delayed release capsule Take one tablet by mouth daily 90 capsule 3    lisinopril (PRINIVIL;ZESTRIL) 10 MG tablet Take 1 tablet by mouth daily 90 tablet 3    blood glucose test strips (GNP TRUE METRIX GLUCOSE STRIPS) strip 1 each by In Vitro route daily As needed.  50 each 3    blood glucose monitor kit and supplies Dispense all needed supplies to include: control solutions 1 kit 0    tamsulosin (FLOMAX) 0.4 MG capsule Take 2 capsules by mouth in the morning and at bedtime (Patient taking differently: Take 0.8 mg by mouth 2 times daily) 180 capsule 3    hyoscyamine (NULEV) 125 MCG TBDP dispersible tablet Take 1 tablet by mouth every 4 hours as needed (abd pain) 360 tablet 1    dicyclomine (BENTYL) 20 MG tablet Take 1 tablet by mouth 2 times daily 180 tablet 3    Calcium Polycarbophil (FIBER-CAPS PO) Take 2 tablets by mouth daily      NONFORMULARY as needed Diltiazem 2% gel apply to rectal fissure daily as directed      aspirin 81 MG EC tablet Take 81 mg by mouth daily      Multiple Vitamins-Minerals (PRESERVISION AREDS) TABS Take by mouth 2 times daily      NONFORMULARY Take 450 mg by mouth 2 times daily Indications: [place] for prostate      Cinnamon 500 MG CAPS Take 500 mg by mouth Daily      Milk Thistle 1000 MG CAPS Take 1,000 mg by mouth Daily      Ubiquinol 100 MG CAPS Take 1 tablet by mouth daily. Bilberry 100 MG CAPS Take 100 mg by mouth Daily      fish oil-omega-3 fatty acids 1000 MG capsule Take 1 g by mouth daily      Chromium Picolinate 500 MCG TABS Take 500 mg by mouth daily      Vitamin E 400 UNIT TABS Take 400 mg by mouth daily      Methylsulfonylmethane (MSM) 1500 MG TABS Take 1,500 mg by mouth daily      Lycopene 10 MG CAPS Take by mouth daily 2 cap      Multiple Vitamin (MULTIVITAMIN PO) Take 1 tablet by mouth daily      Cholecalciferol (VITAMIN D3) 2000 UNIT TABS Take 2,000 mg by mouth daily      Pyridoxine HCl (VITAMIN B-6) 50 MG tablet Take 50 mg by mouth 2 times daily      Vitamins-Lipotropics (BALANCED B-50 COMPLEX PO) Take by mouth 2 times daily      UNABLE TO FIND Apple cider vinegar 625mg daily (Patient not taking: Reported on 1/23/2023)      Ascorbic Acid  MG TBCR Take 500 mg by mouth daily       No current facility-administered medications for this visit.        Social History     Socioeconomic History    Marital status:      Spouse name: None    Number of children: None    Years of education: None    Highest education level: None   Occupational History     Employer: [address] [place] [address]   Tobacco Use    Smoking status: Former     Packs/day: 1.00 Years: 25.00     Pack years: 25.00     Types: Cigarettes     Quit date: 1995     Years since quittin.0    Smokeless tobacco: Former   Substance and Sexual Activity    Alcohol use: Yes     Alcohol/week: 1.0 standard drink     Types: 1 Cans of beer per week     Comment: seldom    Drug use: No    Sexual activity: Not Currently     Partners: Female     Social Determinants of Health     Financial Resource Strain: Low Risk     Difficulty of Paying Living Expenses: Not hard at all   Food Insecurity: No Food Insecurity    Worried About Running Out of Food in the Last Year: Never true    Ran Out of Food in the Last Year: Never true   Physical Activity: Inactive    Days of Exercise per Week: 3 days    Minutes of Exercise per Session: 0 min       Family History   Problem Relation Age of Onset    Heart Disease Mother     Diabetes Mother     Kidney Disease Father     Cancer Brother         colon       Blood pressure 131/78, pulse 56, resp. rate 18, height 6' (1.829 m), weight 228 lb (103.4 kg).     Physical Exam:    General Appearance: alert and oriented to person, place and time, in no acute distress  Cardiovascular: normal rate, regular rhythm, normal S1 and S2, no murmurs, rubs, clicks, or gallops, distal pulses intact, no carotid bruits, no JVD  Pulmonary/Chest: clear to auscultation bilaterally- no wheezes, rales or rhonchi, normal air movement, no respiratory distress  Abdomen: soft, non-tender, non-distended, normal bowel sounds, no masses   Extremities: no cyanosis, clubbing or edema, pulse   Skin: warm and dry, no rash or erythema  Head: normocephalic and atraumatic  Eyes: pupils equal, round, and reactive to light  Neck: supple and non-tender without mass, no thyromegaly   Musculoskeletal: normal range of motion, no joint swelling, deformity or tenderness  Neurological: alert, oriented, normal speech, no focal findings or movement disorder noted    Lab Data:    Cardiac Enzymes:  No results for input(s): CKTOTAL, CKMB, CKMBINDEX, TROPONINI in the last 72 hours. CBC:   Lab Results   Component Value Date/Time    WBC 7.2 12/01/2022 07:45 AM    WBC 13.8 09/04/2022 01:48 AM    RBC 4.32 12/01/2022 07:45 AM    HGB 13.2 12/01/2022 07:45 AM    HCT 39.1 12/01/2022 07:45 AM     12/01/2022 07:45 AM     09/04/2022 01:48 AM       CMP:    Lab Results   Component Value Date/Time     12/01/2022 07:45 AM    K 4.1 12/01/2022 07:45 AM     12/01/2022 07:45 AM    CO2 27 12/01/2022 07:45 AM    BUN 20 12/01/2022 07:45 AM    CREATININE 0.96 12/01/2022 07:45 AM    LABGLOM 72 09/04/2022 01:48 AM    GLUCOSE 123 12/01/2022 07:45 AM    CALCIUM 9.0 12/01/2022 07:45 AM       Hepatic Function Panel:    Lab Results   Component Value Date/Time    ALKPHOS 84 12/01/2022 07:45 AM    ALKPHOS 84 08/12/2022 09:03 AM    ALT 27 12/01/2022 07:45 AM    AST 32 12/01/2022 07:45 AM    PROT 6.3 12/01/2022 07:45 AM    BILITOT 1.7 12/01/2022 07:45 AM    BILITOT Negative 03/09/2020 07:06 AM    BILIDIR 0.3 08/12/2022 09:03 AM    LABALBU 4.4 12/01/2022 07:45 AM    LABALBU 4.5 04/03/2012 09:30 AM       Magnesium:  No results found for: MG    PT/INR:    Lab Results   Component Value Date/Time    INR 1.01 02/27/2014 10:04 AM       HgBA1c:    Lab Results   Component Value Date/Time    LABA1C 5.9 12/01/2022 07:45 AM       FLP:    Lab Results   Component Value Date/Time    TRIG 87 12/01/2022 07:45 AM    HDL 59 12/01/2022 07:45 AM    LDLCALC 60 12/01/2022 07:45 AM    LABVLDL 17 12/01/2022 07:45 AM       TSH:    Lab Results   Component Value Date/Time    TSH 0.810 08/03/2017 12:24 PM        Diagnosis Orders   1.  Essential hypertension, benign  61660 - [place] ELECTROCARDIOGRAM, COMPLETE    atorvastatin (LIPITOR) 40 MG tablet    nitroGLYCERIN (NITROSTAT) 0.4 MG SL tablet           Assessment/Plan    It is a 65years old patient has a history history history of her chest pain had a stress test was abnormal he was treated medically continue to be symptomatic has been using more nitro arrangements will need for the patient to have a heart cath possible intervention he understand the procedure the benefit risk alternative methods with possible complication agrees to done. Patient has a prostate surgery he is healed okay now normal with no bleeding history of hypertension hypercholesterolemia under control his EKG shows sinus rhythm right bundle branch block. The patient will continue current medication patient will be scheduled for a heart cath in few weeks he is to seek medical attention if he had any change in clinical condition and follow-up with family physician long-term care thank    Orders Placed This Encounter   Procedures    [address]459 - [place] ELECTROCARDIOGRAM, COMPLETE       Return in about 4 weeks (around 2/20/2023) for after cath.      Gabino Condon MD

## 2023-02-21 RX ORDER — DIPHENHYDRAMINE HCL 25 MG
50 TABLET ORAL ONCE
Status: CANCELLED | OUTPATIENT
Start: 2023-02-21 | End: 2023-02-21

## 2023-02-22 ENCOUNTER — APPOINTMENT (OUTPATIENT)
Dept: CARDIAC CATH/INVASIVE PROCEDURES | Age: 78
End: 2023-02-22
Payer: MEDICARE

## 2023-02-22 ENCOUNTER — HOSPITAL ENCOUNTER (OUTPATIENT)
Dept: INPATIENT UNIT | Age: 78
Discharge: HOME | End: 2023-02-22
Attending: INTERNAL MEDICINE | Admitting: INTERNAL MEDICINE
Payer: MEDICARE

## 2023-02-22 VITALS
WEIGHT: 226 LBS | TEMPERATURE: 98.5 F | BODY MASS INDEX: 30.61 KG/M2 | DIASTOLIC BLOOD PRESSURE: 66 MMHG | SYSTOLIC BLOOD PRESSURE: 120 MMHG | OXYGEN SATURATION: 97 % | HEART RATE: 56 BPM | RESPIRATION RATE: 17 BRPM | HEIGHT: 72 IN

## 2023-02-22 LAB
ABO: NORMAL
ALBUMIN SERPL BCG-MCNC: 4.3 G/DL (ref 3.5–5.1)
ALP SERPL-CCNC: 91 U/L (ref 38–126)
ALT SERPL W/O P-5'-P-CCNC: 20 U/L (ref 11–66)
ANION GAP SERPL CALC-SCNC: 11 MEQ/L (ref 8–16)
ANTIBODY SCREEN: NORMAL
AST SERPL-CCNC: 24 U/L (ref 5–40)
BILIRUB SERPL-MCNC: 1.2 MG/DL (ref 0.3–1.2)
BUN SERPL-MCNC: 21 MG/DL (ref 7–22)
CALCIUM SERPL-MCNC: 8.9 MG/DL (ref 8.5–10.5)
CHLORIDE SERPL-SCNC: 107 MEQ/L (ref 98–111)
CHOLEST SERPL-MCNC: 133 MG/DL (ref 100–199)
CO2 SERPL-SCNC: 24 MEQ/L (ref 23–33)
CREAT SERPL-MCNC: 0.9 MG/DL (ref 0.4–1.2)
DEPRECATED RDW RBC AUTO: 43.8 FL (ref 35–45)
EKG ATRIAL RATE: 56 BPM
EKG P AXIS: 51 DEGREES
EKG P-R INTERVAL: 182 MS
EKG Q-T INTERVAL: 428 MS
EKG QRS DURATION: 118 MS
EKG QTC CALCULATION (BAZETT): 413 MS
EKG R AXIS: -32 DEGREES
EKG T AXIS: 37 DEGREES
EKG VENTRICULAR RATE: 56 BPM
ERYTHROCYTE [DISTWIDTH] IN BLOOD BY AUTOMATED COUNT: 13 % (ref 11.5–14.5)
GFR SERPL CREATININE-BSD FRML MDRD: > 60 ML/MIN/1.73M2
GLUCOSE SERPL-MCNC: 118 MG/DL (ref 70–108)
HCT VFR BLD AUTO: 41.1 % (ref 42–52)
HDLC SERPL-MCNC: 66 MG/DL
HGB BLD-MCNC: 13.7 GM/DL (ref 14–18)
INR PPP: 1.03 (ref 0.85–1.13)
LDLC SERPL CALC-MCNC: 55 MG/DL
MCH RBC QN AUTO: 30.8 PG (ref 26–33)
MCHC RBC AUTO-ENTMCNC: 33.3 GM/DL (ref 32.2–35.5)
MCV RBC AUTO: 92.4 FL (ref 80–94)
PLATELET # BLD AUTO: 178 THOU/MM3 (ref 130–400)
PMV BLD AUTO: 10.7 FL (ref 9.4–12.4)
POTASSIUM SERPL-SCNC: 4.3 MEQ/L (ref 3.5–5.2)
PROT SERPL-MCNC: 6.9 G/DL (ref 6.1–8)
RBC # BLD AUTO: 4.45 MILL/MM3 (ref 4.7–6.1)
RH FACTOR: NORMAL
SODIUM SERPL-SCNC: 142 MEQ/L (ref 135–145)
TRIGL SERPL-MCNC: 60 MG/DL (ref 0–199)
WBC # BLD AUTO: 7.9 THOU/MM3 (ref 4.8–10.8)

## 2023-02-22 PROCEDURE — 80061 LIPID PANEL: CPT

## 2023-02-22 PROCEDURE — 6360000002 HC RX W HCPCS

## 2023-02-22 PROCEDURE — 86900 BLOOD TYPING SEROLOGIC ABO: CPT

## 2023-02-22 PROCEDURE — 85027 COMPLETE CBC AUTOMATED: CPT

## 2023-02-22 PROCEDURE — 93458 L HRT ARTERY/VENTRICLE ANGIO: CPT | Performed by: INTERNAL MEDICINE

## 2023-02-22 PROCEDURE — 2580000003 HC RX 258: Performed by: INTERNAL MEDICINE

## 2023-02-22 PROCEDURE — 2500000003 HC RX 250 WO HCPCS

## 2023-02-22 PROCEDURE — 93005 ELECTROCARDIOGRAM TRACING: CPT | Performed by: INTERNAL MEDICINE

## 2023-02-22 PROCEDURE — 85610 PROTHROMBIN TIME: CPT

## 2023-02-22 PROCEDURE — 86901 BLOOD TYPING SEROLOGIC RH(D): CPT

## 2023-02-22 PROCEDURE — 93010 ELECTROCARDIOGRAM REPORT: CPT | Performed by: NUCLEAR MEDICINE

## 2023-02-22 PROCEDURE — 80053 COMPREHEN METABOLIC PANEL: CPT

## 2023-02-22 PROCEDURE — 93458 L HRT ARTERY/VENTRICLE ANGIO: CPT

## 2023-02-22 PROCEDURE — 99152 MOD SED SAME PHYS/QHP 5/>YRS: CPT | Performed by: INTERNAL MEDICINE

## 2023-02-22 PROCEDURE — 36415 COLL VENOUS BLD VENIPUNCTURE: CPT

## 2023-02-22 PROCEDURE — 6360000004 HC RX CONTRAST MEDICATION: Performed by: INTERNAL MEDICINE

## 2023-02-22 PROCEDURE — 86850 RBC ANTIBODY SCREEN: CPT

## 2023-02-22 RX ORDER — SODIUM CHLORIDE 0.9 % (FLUSH) 0.9 %
5-40 SYRINGE (ML) INJECTION EVERY 12 HOURS SCHEDULED
Status: DISCONTINUED | OUTPATIENT
Start: 2023-02-22 | End: 2023-02-22 | Stop reason: HOSPADM

## 2023-02-22 RX ORDER — SODIUM CHLORIDE 9 MG/ML
INJECTION, SOLUTION INTRAVENOUS PRN
Status: DISCONTINUED | OUTPATIENT
Start: 2023-02-22 | End: 2023-02-22 | Stop reason: HOSPADM

## 2023-02-22 RX ORDER — SODIUM CHLORIDE 0.9 % (FLUSH) 0.9 %
5-40 SYRINGE (ML) INJECTION PRN
Status: DISCONTINUED | OUTPATIENT
Start: 2023-02-22 | End: 2023-02-22 | Stop reason: HOSPADM

## 2023-02-22 RX ORDER — ATROPINE SULFATE 0.4 MG/ML
0.5 INJECTION, SOLUTION INTRAVENOUS
Status: DISCONTINUED | OUTPATIENT
Start: 2023-02-22 | End: 2023-02-22 | Stop reason: HOSPADM

## 2023-02-22 RX ORDER — ASPIRIN 325 MG
325 TABLET ORAL ONCE
Status: DISCONTINUED | OUTPATIENT
Start: 2023-02-22 | End: 2023-02-22 | Stop reason: HOSPADM

## 2023-02-22 RX ORDER — ONDANSETRON 2 MG/ML
4 INJECTION INTRAMUSCULAR; INTRAVENOUS EVERY 6 HOURS PRN
Status: DISCONTINUED | OUTPATIENT
Start: 2023-02-22 | End: 2023-02-22 | Stop reason: HOSPADM

## 2023-02-22 RX ORDER — SODIUM CHLORIDE 9 MG/ML
INJECTION, SOLUTION INTRAVENOUS CONTINUOUS
Status: DISCONTINUED | OUTPATIENT
Start: 2023-02-22 | End: 2023-02-22 | Stop reason: HOSPADM

## 2023-02-22 RX ORDER — TAMSULOSIN HYDROCHLORIDE 0.4 MG/1
0.4 CAPSULE ORAL 2 TIMES DAILY
COMMUNITY

## 2023-02-22 RX ORDER — ACETAMINOPHEN 325 MG/1
650 TABLET ORAL EVERY 4 HOURS PRN
Status: DISCONTINUED | OUTPATIENT
Start: 2023-02-22 | End: 2023-02-22 | Stop reason: HOSPADM

## 2023-02-22 RX ORDER — SODIUM CHLORIDE 9 MG/ML
100 INJECTION, SOLUTION INTRAVENOUS CONTINUOUS
Status: DISCONTINUED | OUTPATIENT
Start: 2023-02-22 | End: 2023-02-22 | Stop reason: HOSPADM

## 2023-02-22 RX ADMIN — IOPAMIDOL 110 ML: 755 INJECTION, SOLUTION INTRAVENOUS at 08:56

## 2023-02-22 RX ADMIN — SODIUM CHLORIDE: 9 INJECTION, SOLUTION INTRAVENOUS at 06:46

## 2023-02-22 NOTE — DISCHARGE INSTRUCTIONS
Call 911 for chest pain. Followup appointment in 4-6 wks, call office for specifics. Pat Juárez MD, MD       POST RADIAL  Take it easy for 3-4 days and limit vigorous activity (contact sports) for 2 weeks after the procedure. No driving for 2 days after the procedure. No lifting of 5 lbs. or more for 5 days with the affected arm. You can shower after 24 hours. Remove the arm board after 24 hours. Apply a clean band aid to the insertion site for 5 days after the procedure. Wash the site with soap and water daily. No creams, ointments, or powders near the insertion site. No bath tubs, swimming, hot tubs or hand - washing dishes for 1 week after the procedure. Watch for signs of infection (redness, warmth, swelling, pus drainage) or if there is coolness of the extremity. Call the physician if this occurs. If there is bleeding from the incision, apply pressure to it and call 911. Watch for numbness / tingling. If this occurs, go to the Emergency Room as soon as possible. May apply ice for 15 minutes with an hour break in between if needed for comfort. Alternate with a heat compress for 15 minutes to reduce the swelling. Do this for 3 - 5 days after procedure if needed. Coronary Angiogram: What to Expect at [address] [place] [address]     A coronary angiogram is a test to examine the large blood vessels of your heart (coronary arteries). The doctor inserted a thin, flexible tube (catheter) into a blood vessel in your groin or wrist.  Your groin or wrist may have a bruise and feel sore for a few days after the procedure. You can do light activities around the house. But do not do anything strenuous until your doctor says it is okay. This may be for several days. This care sheet gives you a general idea about how long it will take for you to recover. But each person recovers at a different pace. Follow the steps below to feel better as quickly as possible.   How can you care for yourself at home?  Activity    If the doctor gave you a sedative: For 24 hours, don't do anything that requires attention to detail, such as going to work, making important decisions, or signing any legal documents. It takes time for the medicine's effects to completely wear off. For your safety, do not drive or operate any machinery that could be dangerous. Wait until the medicine wears off and you can think clearly and react easily. Do not do strenuous exercise and do not lift, pull, or push anything heavy until your doctor says it is okay. This may be for several days. You can walk around the house and do light activity, such as cooking. If the catheter was placed in your groin, try not to walk up stairs for the first couple of days. If the catheter was placed in your wrist, do not bend your wrist deeply for the first couple of days. Be careful using your hand to get into and out of a chair or bed. Get regular exercise. Walking may be a good choice. Try for at least 30 minutes on most days of the week. Diet    Drink plenty of fluids to help your body flush out the dye. If you have kidney, heart, or liver disease and have to limit fluids, talk with your doctor before you increase the amount of fluids you drink. Keep eating a heart-healthy diet that has lots of fruits, vegetables, and whole grains. If you have not been eating this way, talk to your doctor. You also may want to talk to a dietitian. This expert can help you to learn about healthy foods and plan meals. Medicines    Your doctor will tell you if and when you can restart your medicines. You will also be given instructions about taking any new medicines. If you stopped taking aspirin or some other blood thinner, your doctor will tell you when to start taking it again. Your doctor may prescribe a blood-thinning medicine like aspirin or clopidogrel (Plavix).  It is very important that you take these medicines exactly as directed in order to keep the coronary artery open and reduce your risk of a heart attack. Be safe with medicines. Call your doctor if you think you are having a problem with your medicine. Care of the catheter site    For 1 or 2 days, keep a bandage over the spot where the catheter was inserted. The bandage probably will fall off in this time. Put ice or a cold pack on the area for 10 to 20 minutes at a time to help with soreness or swelling. Put a thin cloth between the ice and your skin. You may shower 24 to 48 hours after the procedure, if your doctor okays it. Pat the incision dry. Do not soak the catheter site until it is healed. Don't take a bath for 1 week, or until your doctor tells you it is okay. Watch for bleeding from the site. A small amount of blood (up to the size of a quarter) on the bandage can be normal.     If you are bleeding, lie down and press on the area for 15 minutes to try to make it stop. If the bleeding does not stop, call your doctor or seek immediate medical care. Follow-up care is a key part of your treatment and safety. Be sure to make and go to all appointments, and call your doctor if you are having problems. It's also a good idea to know your test results and keep a list of the medicines you take. When should you call for help? Call 911 anytime you think you may need emergency care. For example, call if:    You passed out (lost consciousness). You have severe trouble breathing. You have sudden chest pain and shortness of breath, or you cough up blood. You have symptoms of a heart attack. These may include:  Chest pain or pressure, or a strange feeling in the chest.  Sweating. Shortness of breath. Nausea or vomiting. Pain, pressure, or a strange feeling in the back, neck, jaw, or upper belly, or in one or both shoulders or arms. Lightheadedness or sudden weakness. A fast or irregular heartbeat.      After you call 911, the  may tell you to chew 1 adult-strength or 2 to 4 low-dose aspirin. Wait for an ambulance. Do not try to drive yourself. You have been diagnosed with angina, and you have symptoms that do not go away with rest or are not getting better within 5 minutes after you take a dose of nitroglycerin. Call your doctor now or seek immediate medical care if:    You are bleeding from the area where the catheter was put in your artery. You have a fast-growing, painful lump at the catheter site. You have signs of infection, such as: Increased pain, swelling, warmth, or redness. Red streaks leading from the catheter site. Pus draining from the catheter site. A fever. Your leg or hand is painful, looks blue, or feels cold, numb, or tingly. Watch closely for changes in your health, and be sure to contact your doctor if you have any problems. Where can you learn more? Go to http://www.woods.com/ and enter D192 to learn more about \"Coronary Angiogram: What to Expect at Home. \"  Current as of: September 7, 2022               Content Version: 13.5  © 9745-9642 [employer]wise, Incorporated. Care instructions adapted under license by [facility] ([facility]). If you have questions about a medical condition or this instruction, always ask your healthcare professional. [address] any warranty or liability for your use of this information.

## 2023-02-22 NOTE — H&P
[facility]   Sedation/Analgesia History and Physical    Pt Name: Yvette Guzman  MRN: [id number]  YOB: 1945  Provider Performing Procedure: Pablo Yanez MD, MD  Primary Care Physician: London Dietrich MD      Pre-Procedure: Chest pain, possible coronary artery disease, abnormal stress test    Consent: I have discussed with the patient and/or the patient representative the indication, alternatives, and the possible risks and/or complications of the planned procedure and the anesthesia methods. The patient and/or representative appear to understand and agree to proceed. Medical History:   has a past medical history of Arthritis, CAD (coronary artery disease), Chronic obstructive pulmonary disease ([facility] [address] GERD (gastroesophageal reflux disease), Hyperglycemia, Hyperlipidemia, Hypertension, IBS (irritable bowel syndrome), Kidney stones, Malignant neoplasm of trigone of urinary bladder ([facility] [address] Obstructive sleep apnea, and Prostatitis. .    Surgical History:     has a past surgical history that includes hernia repair (2005); Colon surgery (2008); Cardiac catheterization (9/2007); Coronary angioplasty with stent (2007); back surgery (07/08/2013); Abdomen surgery; Endoscopy, colon, diagnostic; Dilatation, esophagus; Colonoscopy (2011?); Hip Arthroplasty (Right, 3/25/2014); other surgical history (08/25/2016); Cystoscopy (N/A, 2/28/2020); Cystoscopy (N/A, 10/4/2021); and TURP (N/A, 8/26/2022). Allergies:    Allergies as of 02/22/2023 - Fully Reviewed 02/22/2023   Allergen Reaction Noted    Avodart [dutasteride] Other (See Comments) 07/13/2016    Myrbetriq [mirabegron] Other (See Comments) 09/10/2020       Medications:   Coumadin use last 5 days:  No  Antiplatelet drug therapy use last 5 days:  Yes  Other anticoagulant use last 5 days:  No   sodium chloride flush  5-40 mL IntraVENous 2 times per day    aspirin  325 mg Oral Once     Prior to Admission medications    Medication Sig Start Date End Date Taking? Authorizing Provider   atorvastatin (LIPITOR) 40 MG tablet Take 1 tablet by mouth nightly 1/23/23   Cinthya Roche MD   nitroGLYCERIN (NITROSTAT) 0.4 MG SL tablet Place 1 tablet under the tongue every 5 minutes as needed for Chest pain 1/23/23   Cinthya Roche MD   metoprolol tartrate (LOPRESSOR) 25 MG tablet Take 1 tablet by mouth daily 12/13/22   Schuyler House MD   omeprazole (PRILOSEC) 20 MG delayed release capsule Take one tablet by mouth daily 12/13/22   Schuyler House MD   lisinopril (PRINIVIL;ZESTRIL) 10 MG tablet Take 1 tablet by mouth daily 12/13/22   Schuyler House MD   blood glucose test strips (GNP TRUE METRIX GLUCOSE STRIPS) strip 1 each by In Vitro route daily As needed.  12/13/22   Schuyler House MD   blood glucose monitor kit and supplies Dispense all needed supplies to include: control solutions 12/13/22   Schuyler House MD   tamsulosin [facility]) 0.4 MG capsule Take 2 capsules by mouth in the morning and at bedtime  Patient taking differently: Take 0.4 mg by mouth in the morning and at bedtime 10/25/22 10/25/23  Mely Salguero PA-C   hyoscyamine (NULEV) 125 MCG TBDP dispersible tablet Take 1 tablet by mouth every 4 hours as needed (abd pain) 8/5/22   Schuyler House MD   dicyclomine (BENTYL) 20 MG tablet Take 1 tablet by mouth 2 times daily 4/18/22   Schuyler House MD   Calcium Polycarbophil (FIBER-CAPS PO) Take 2 tablets by mouth daily    Historical Provider, MD   NONFORMULARY as needed Diltiazem 2% gel apply to rectal fissure daily as directed    Historical Provider, MD   aspirin 81 MG EC tablet Take 81 mg by mouth daily    Historical Provider, MD   Multiple Vitamins-Minerals (PRESERVISION AREDS) TABS Take by mouth 2 times daily    Historical Provider, MD   UNABLE TO FIND Apple cider vinegar 625mg daily  Patient not taking: No sig reported    Historical Provider, MD   NONFORMULARY Take 450 mg by mouth 2 times daily Indications: [place] for prostate Historical Provider, MD   Cinnamon 500 MG CAPS Take 500 mg by mouth Daily    Historical Provider, MD   Milk Thistle 1000 MG CAPS Take 1,000 mg by mouth Daily    Historical Provider, MD   Ubiquinol 100 MG CAPS Take 1 tablet by mouth daily. Historical Provider, MD   Bilberry 100 MG CAPS Take 100 mg by mouth Daily    Historical Provider, MD   fish oil-omega-3 fatty acids 1000 MG capsule Take 1 g by mouth daily    Historical Provider, MD   Chromium Picolinate 500 MCG TABS Take 500 mg by mouth daily    Historical Provider, MD   Vitamin E 400 UNIT TABS Take 400 mg by mouth daily    Historical Provider, MD   Ascorbic Acid  MG TBCR Take 500 mg by mouth daily    Historical Provider, MD   Methylsulfonylmethane (MSM) 1500 MG TABS Take 1,500 mg by mouth daily    Historical Provider, MD   Lycopene 10 MG CAPS Take by mouth daily 2 cap    Historical Provider, MD   Multiple Vitamin (MULTIVITAMIN PO) Take 1 tablet by mouth daily    Historical Provider, MD   Cholecalciferol (VITAMIN D3) 2000 UNIT TABS Take 2,000 mg by mouth daily    Historical Provider, MD   Pyridoxine HCl (VITAMIN B-6) 50 MG tablet Take 50 mg by mouth 2 times daily    Historical Provider, MD   Vitamins-Lipotropics (BALANCED B-50 COMPLEX PO) Take by mouth 2 times daily    Historical Provider, MD       Vital Signs  Vitals:    02/22/23 0715   BP: 135/72   Pulse: 57   Resp: 18   Temp: 98.5 °F (36.9 °C)   SpO2: 98%       Physical:  Heart:  Regular rate and rhythm  Lungs:  Clear  Abdomen:  Soft  Mental Status:  Alert and Oriented    Planned Procedure:  Left Heart Cath and Possible Percutaneous Coronary Intervention    Sedation/ Anesthesia Plan: Midazolam and Sublimaze    ASA Classification: Class 3 - A patient with severe systemic disease that limits activity but is not incapacitating    Mallampati Airway Classification: II (soft palate, uvula, fauces visible)    Pre-procedure diagnostic studies complete and results available.    Previous sedation/anesthesia experiences assessed. The patient is an appropriate candidate to undergo the planned procedure sedation and anesthesia. (Refer to nursing sedation/analgesia documentation record)  Formulation and discussion of sedation/procedure plan, risks, and expectations with patient and/or responsible adult completed. Patient examined immediately prior to the procedure.  (Refer to nursing sedation/analgesia documentation record)    Valentino Ferguson, MD, MD  Electronically signed 2/22/2023 at 7:44 AM  Patient Name: Dylon Young Record Number: [id number]  Date: 2/22/2023   Time: 7:44 AM   Room/Bed: 2E-05/005-A

## 2023-02-22 NOTE — OP NOTE
Operative Note      Patient: [address] [facility]  Sedation/Analgesia Post Sedation Record      Pt Name: Gifty Somers  MRN: [id number]  YOB: 1945  Procedure Performed By: Obdulia Naidu MD, MD  Primary Care Physician: Dell Soto MD    POST-PROCEDURE    Physician: Obdulia Naidu MD, MD    Procedure Performed:  Left Heart Cath    Sedation/Anesthesia:  Local Anesthesia and IV Conscious Sedation with continuous O2 monitoring    Estimated Blood Loss:  Minimal    Specimens Removed:  None    Complications:  None     Post Procedure Diagnosis/Findings:  Coronary Artery Disease    Recommendations:  Medical treatment and review films.        Obdulia Naidu MD, MD  Electronically signed 2/22/2023 at 8:55 AM

## 2023-02-22 NOTE — PROCEDURES
[address] [place] [address]                            CARDIAC CATHETERIZATION    PATIENT NAME: Gordo Martino                  :        1945  MED REC NO:   [id number]                           ROOM:       0005  ACCOUNT NO:   [de-identified]                           ADMIT DATE: 2023  PROVIDER:     María Garcia M.D.    Tamiko Rosey:  2023    INDICATION FOR PROCEDURE:  This is a patient who is 58-year-old  gentleman with history of coronary artery disease, chest pain, shortness  of breath. He has been complaining of chest pain and shortness of  breath. He was treated medically, continued to be symptomatic, admitted  for a heart cath, possible intervention. PROCEDURES PERFORMED:  1.  IV conscious sedation. The patient was given IV conscious sedation  in incremental dosage by the circulating cath lab RN, monitored by the  cath lab monitor tech under my supervision. The procedure started at  08:30 a.m. and finished at 9 a.m. No acute complication from procedure. Estimated blood loss about 10 mL. 2.  Left heart cath. The right radial artery was cannulated with a  6-[place] sheath. Coronary angiogram showed the RCA is a dominant RCA. RCA was patent. Left main was patent, bifurcates to the LAD and circumflex. The LAD has  nonobstructive disease in its mid course. Distally, it was patent. Circumflex artery was patent. The left ventriculogram showed preserved systolic function of the left  ventricle and ejection fraction about 55%. No mitral regurg. No  gradient across the aortic valve. Left ventricular end-diastolic  pressure was 13. The patient has no acute complication from the  procedure. IMPRESSION:  1. Preserved systolic function of the left ventricle and ejection  fraction about 55%. No gross wall motion abnormality is seen. No  mitral regurg.   No gradient across the aortic valve.  2.  Essentially patent coronary arteries. PLAN:  At this point, continue with medical treatment, risk factor  modification, and periodic followup. The patient continued to be  symptomatic. Noncardiac cause for his symptoms should be evaluated.         Valeria Cuellar M.D.    D: 02/22/2023 8:58:29       T: 02/22/2023 9:27:25     AS/JUAN RAMON_ROBERTO  Job#: [id number]     Doc#: [id number]    CC:

## 2023-02-22 NOTE — PLAN OF CARE
1300  discharge instructions given to pt and son, both \"voiced understanding\"    18  discharged per wc per transport team with personal belongings   Has home pills, 2 rings, bracelet, cell phone with him

## 2023-02-22 NOTE — PROGRESS NOTES
0600  Pt admitted to  2E05 ambulatory for left heart cath. Pt NPO. Patient accompanied by son, Justina De Oliveira . Vital signs obtained. Assessment and data collection initiated. Oriented to room. Policies and procedures for 2E explained   All questions answered with no further questions at this time. Fall prevention and safety precautions discussed with patient. Care plan reviewed with patient and son .   Patient and son  verbalize understanding of the plan of care and contribute to goal setting.     0750  to procedure per bed

## 2023-02-27 ENCOUNTER — OFFICE VISIT (OUTPATIENT)
Dept: UROLOGY | Age: 78
End: 2023-02-27
Payer: MEDICARE

## 2023-02-27 VITALS
HEIGHT: 72 IN | DIASTOLIC BLOOD PRESSURE: 64 MMHG | WEIGHT: 238 LBS | SYSTOLIC BLOOD PRESSURE: 108 MMHG | BODY MASS INDEX: 32.23 KG/M2

## 2023-02-27 DIAGNOSIS — R35.1 NOCTURIA: ICD-10-CM

## 2023-02-27 DIAGNOSIS — N13.8 BPH WITH OBSTRUCTION/LOWER URINARY TRACT SYMPTOMS: Primary | ICD-10-CM

## 2023-02-27 DIAGNOSIS — N40.1 BPH WITH OBSTRUCTION/LOWER URINARY TRACT SYMPTOMS: Primary | ICD-10-CM

## 2023-02-27 LAB
BILIRUBIN URINE: NEGATIVE
BLOOD URINE, POC: NEGATIVE
CHARACTER, URINE: CLEAR
COLOR, URINE: YELLOW
GLUCOSE URINE: NEGATIVE MG/DL
KETONES, URINE: NEGATIVE
LEUKOCYTE CLUMPS, URINE: NEGATIVE
NITRITE, URINE: NEGATIVE
PH, URINE: 6 (ref 5–9)
POST VOID RESIDUAL (PVR): 114 ML
PROTEIN, URINE: NEGATIVE MG/DL
SPECIFIC GRAVITY, URINE: 1.02 (ref 1–1.03)
UROBILINOGEN, URINE: 0.2 EU/DL (ref 0–1)

## 2023-02-27 PROCEDURE — 51798 US URINE CAPACITY MEASURE: CPT | Performed by: UROLOGY

## 2023-02-27 PROCEDURE — 3078F DIAST BP <80 MM HG: CPT | Performed by: UROLOGY

## 2023-02-27 PROCEDURE — G8417 CALC BMI ABV UP PARAM F/U: HCPCS | Performed by: UROLOGY

## 2023-02-27 PROCEDURE — G8427 DOCREV CUR MEDS BY ELIG CLIN: HCPCS | Performed by: UROLOGY

## 2023-02-27 PROCEDURE — G8484 FLU IMMUNIZE NO ADMIN: HCPCS | Performed by: UROLOGY

## 2023-02-27 PROCEDURE — 1036F TOBACCO NON-USER: CPT | Performed by: UROLOGY

## 2023-02-27 PROCEDURE — 3074F SYST BP LT 130 MM HG: CPT | Performed by: UROLOGY

## 2023-02-27 PROCEDURE — 99213 OFFICE O/P EST LOW 20 MIN: CPT | Performed by: UROLOGY

## 2023-02-27 PROCEDURE — 81003 URINALYSIS AUTO W/O SCOPE: CPT | Performed by: UROLOGY

## 2023-02-27 PROCEDURE — 1123F ACP DISCUSS/DSCN MKR DOCD: CPT | Performed by: UROLOGY

## 2023-02-27 NOTE — PROGRESS NOTES
MD MD [facility] [address] [facility] Consultation / New Patient Visit    Patient:  Bertha Moreau  YOB: 1945  Date: 2/27/2023  Consult requested from Remigio Montesinos MD     HISTORY OF PRESENT ILLNESS:   The patient is a 65 y.o. male who presents today for follow-up for the following problem(s): history of bladder cancer, BPH with LUTs  Overall the problem(s) : are worsening. Associated Symptoms: No dysuria, gross hematuria. Pain Severity:      Today visit:   2/27/23   S/p Greenlight PVP (8/26/22) for BPH (large gland) , post op urinary retention. Still taking Flomax (0.8 mg). - suspected secondary to behaviors and intrinsic bladder pathology and overactivity. Cysto:  Prostate: Patent prostate fossa, Bladder: No tumors or CIS noted    Summary of old records:   (Patient's old records, notes and chart reviewed and summarized above.)    Sp Cysto bladder biopsy  Bladder, biopsy:             Benign urothelial mucosa with von Brunn's nests. Negative for malignancy. Cystoscopy Operative Note  Surgeon: Muriel Estrada MD   Anesthesia: Urethral 2%  Indications: bladder cancer  Position: supine  Findings:   The patient was prepped and draped in the usual sterile fashion. The flexible cystoscope was advanced through the urethra and into the bladder. The bladder was thoroughly inspected and the following was noted:    Summary of old records:   (Patient's old records, notes and chart reviewed and summarized above.)    Residual Urine:  Significant  Urethra: No abnormalities of the urethra are noted. Prostate: Large gland Complete obstruction by lateral & small median lobe of prostate. Bladder: No tumors or CIS noted. No bladder diverticulum. Severe trabeculation noted. Ureters: Clear efflux from both ureters. Orifices with normal configuration and location. The cystoscope was removed. The patient tolerated the procedure well.   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 He is on Flomax and Marlen Hotchkiss and Praful with respectable improvement although not completely satisfied. He feels he does not empty his bladder completely. He has nocturia x2.  UA today: WNL  PVR today: 105 mL. Last several PSA's:  Lab Results   Component Value Date    PSA 0.8 05/19/2016    PSA 0.7 10/06/2015    PSA 0.8 12/16/2014       Last total testosterone:  No results found for: TESTOSTERONE    Urinalysis today:  No results found for this visit on 02/27/23. Last BUN and creatinine:  Lab Results   Component Value Date    BUN 21 02/22/2023     Lab Results   Component Value Date    CREATININE 0.9 02/22/2023       Imaging Reviewed during this Office Visit:   (results were independently reviewed by physician and radiology report verified)    PAST MEDICAL, FAMILY AND SOCIAL HISTORY:  Past Medical History:   Diagnosis Date    Arthritis     CAD (coronary artery disease)     Chronic obstructive pulmonary disease ([address] 9/17/2019    GERD (gastroesophageal reflux disease)     Hyperglycemia 3/18/2016    Hyperlipidemia     Hypertension     IBS (irritable bowel syndrome)     Kidney stones     Malignant neoplasm of trigone of urinary bladder ([address]     Obstructive sleep apnea     wears cpap    Prostatitis      Past Surgical History:   Procedure Laterality Date    ABDOMEN SURGERY      BACK SURGERY  07/08/2013    Lumbar Coyle L2-S-1, revision, PLIF L5-S-1 w/ grafting    CARDIAC CATHETERIZATION  9/2007    COLON SURGERY  2008    Dr. Lena Bautista  2011?     Dr. Triston Johnson  2007    CYSTOSCOPY N/A 2/28/2020    CYSTOSCOPY TRANSURETHRAL RESECTION BLADDER TUMOR performed by Santiago Prader, MD at [facility] [address] 10/4/2021    CYSTOSCOPY, BLADDER BIOPSY WITH FULGURATION performed by Naa Smith MD at [address] [facility], ESOPHAGUS      ENDOSCOPY, Sony Mackey      Dr. Mohsen Leo Right 3/25/2014    right total hip    OTHER SURGICAL HISTORY  2016    TURBT by Dr July NUR N/A 2022    CYSTO WITH GREENLIGHT PHOTOVAPORIZATION OF THE PROSTATE performed by Naa Smith MD at [address] History   Problem Relation Age of Onset    Heart Disease Mother     Diabetes Mother     Kidney Disease Father     Cancer Brother         colon     No outpatient medications have been marked as taking for the 23 encounter (Appointment) with Naa Smith MD.       Avodart [dutasteride] and Myrbetriq [mirabegron]  Social History     Tobacco Use   Smoking Status Former    Packs/day: 1.00    Years: 25.00    Pack years: 25.00    Types: Cigarettes    Quit date: 1995    Years since quittin.1   Smokeless Tobacco Former       Social History     Substance and Sexual Activity   Alcohol Use Yes    Alcohol/week: 1.0 standard drink    Types: 1 Cans of beer per week    Comment: seldom       REVIEW OF SYSTEMS:  Constitutional: negative  Eyes: negative  Respiratory: negative  Cardiovascular: negative  Gastrointestinal: negative  Musculoskeletal: negative  Genitourinary: negative  Skin: negative   Neurological: negative  Hematological/Lymphatic: negative  Psychological: negative    Physical Exam:    This a 65 y.o. male   There were no vitals filed for this visit. Constitutional: Patient in no acute distress   Neuro: alert and oriented to person place and time. Psych: Mood and affect normal.  Head: atraumatic normocephalic  Eyes: EOMi  HEENT: neck supple, trachea midline  Lungs: Respiratory effort normal  Cardiovascular:  Normal peripheral pulses  Abdomen: Soft, non-tender, non-distended, No CVA  Bladder: non-tender and not distended.   FROMx4, no cyanosis clubbing edema  Skin: warm and dry      Assessment and Plan      1. BPH with obstruction/lower urinary tract symptoms    2. Nocturia         Plan:      No follow-ups on file. Greenlight PVP for BPH with LUTs, persistent irritative symptoms. AUASS: 18/3. PVR: 114 ml.  - Nocturia - suspected behavioral in nature  - discontinue flomax.

## 2023-03-24 ENCOUNTER — OFFICE VISIT (OUTPATIENT)
Dept: CARDIOLOGY CLINIC | Age: 78
End: 2023-03-24
Payer: MEDICARE

## 2023-03-24 VITALS
BODY MASS INDEX: 31.83 KG/M2 | WEIGHT: 235 LBS | HEIGHT: 72 IN | HEART RATE: 60 BPM | SYSTOLIC BLOOD PRESSURE: 114 MMHG | DIASTOLIC BLOOD PRESSURE: 67 MMHG | RESPIRATION RATE: 18 BRPM

## 2023-03-24 DIAGNOSIS — I10 ESSENTIAL HYPERTENSION, BENIGN: ICD-10-CM

## 2023-03-24 DIAGNOSIS — E78.2 MIXED HYPERLIPIDEMIA: ICD-10-CM

## 2023-03-24 DIAGNOSIS — I25.10 CORONARY ARTERY DISEASE DUE TO LIPID RICH PLAQUE: ICD-10-CM

## 2023-03-24 DIAGNOSIS — G47.33 OSA (OBSTRUCTIVE SLEEP APNEA): ICD-10-CM

## 2023-03-24 DIAGNOSIS — I25.83 CORONARY ARTERY DISEASE DUE TO LIPID RICH PLAQUE: ICD-10-CM

## 2023-03-24 DIAGNOSIS — R07.9 CHEST PAIN AT REST: Primary | ICD-10-CM

## 2023-03-24 PROCEDURE — 93000 ELECTROCARDIOGRAM COMPLETE: CPT | Performed by: INTERNAL MEDICINE

## 2023-03-24 RX ORDER — NITROGLYCERIN 0.4 MG/1
0.4 TABLET SUBLINGUAL EVERY 5 MIN PRN
Qty: 25 TABLET | Refills: 1 | Status: SHIPPED | OUTPATIENT
Start: 2023-03-24

## 2023-03-24 ASSESSMENT — ENCOUNTER SYMPTOMS
CHEST TIGHTNESS: 1
GASTROINTESTINAL NEGATIVE: 1

## 2023-03-24 NOTE — PROGRESS NOTES
17150 - NE ELECTROCARDIOGRAM, COMPLETE      3. Coronary artery disease due to lipid rich plaque        4. Mixed hyperlipidemia        5. ANTWON (obstructive sleep apnea)              Assessment and Plan:  Ryan Ross is a pleasant 67-year-old gentleman who has no complaints of he states that since his cardiac catheterization he has had chest pain where he took a nitro and it seemed to help -he states that he woke up in the middle the night with chest pressure no radiation no associated symptoms took a nitro and went back to sleep. He does have sleep apnea that he does not use his CPAP machine. Sp cath 2/22/23  1. Preserved systolic function of the left ventricle and ejection  fraction about 55%. No gross wall motion abnormality is seen. No  mitral regurg. No gradient across the aortic valve. 2.  Essentially patent coronary arteries. HTN  HLP  LDL 55    Obstructive sleep apnea-noncompliant with CPAP therapy    Ryan Ross is doing well from a cardiac standpoint. His last cardiac catheterization revealed patent coronary arteries. His chest pressure sounds like it is sleep apnea related. He refuses to wear his CPAP and refuses to go back to the pulmonologist for advanced therapies. He is happy with where he is at he only takes his nitro as needed which is about once a month. I told him we will see him in 1 year or sooner if he should have any difficulties. Orders Placed This Encounter   Procedures    Jacob Vick current treatment plan    There are no Patient Instructions on file for this visit. Return in about 1 year (around 3/24/2024), or if symptoms worsen or fail to improve, for CAD.     Follow up with Dr Alexandra Byrne as scheduled or sooner if needed    CHRISTA Zuleta - CNP

## 2023-06-05 LAB
ABSOLUTE BASO #: 0.06 K/UL (ref 0–0.2)
ABSOLUTE EOS #: 0.27 K/UL (ref 0–0.5)
ABSOLUTE LYMPH #: 2.49 K/UL (ref 1–4)
ABSOLUTE MONO #: 0.66 K/UL (ref 0.2–1)
ABSOLUTE NEUT #: 3.34 K/UL (ref 1.5–7.5)
ALBUMIN SERPL-MCNC: 4.6 G/DL (ref 3.5–5.2)
ALK PHOSPHATASE: 81 U/L (ref 40–125)
ALT SERPL-CCNC: 23 U/L (ref 5–50)
ANION GAP SERPL CALCULATED.3IONS-SCNC: 8 MEQ/L (ref 7–16)
AST SERPL-CCNC: 24 U/L (ref 9–50)
AVERAGE GLUCOSE: 123 MG/DL
BASOPHILS RELATIVE PERCENT: 0.9 %
BILIRUB SERPL-MCNC: 2.1 MG/DL
BUN BLDV-MCNC: 20 MG/DL (ref 8–23)
CALCIUM SERPL-MCNC: 9.4 MG/DL (ref 8.5–10.5)
CHLORIDE BLD-SCNC: 105 MEQ/L (ref 95–107)
CHOLESTEROL/HDL RATIO: 2.5 RATIO
CHOLESTEROL: 148 MG/DL
CO2: 29 MEQ/L (ref 19–31)
CREAT SERPL-MCNC: 1.05 MG/DL (ref 0.8–1.4)
EGFR IF NONAFRICAN AMERICAN: 73 ML/MIN/1.73
EOSINOPHILS RELATIVE PERCENT: 3.9 %
GLUCOSE: 114 MG/DL (ref 70–99)
HBA1C MFR BLD: 5.9 % (ref 4.2–5.6)
HCT VFR BLD CALC: 42.6 % (ref 40–51)
HDLC SERPL-MCNC: 59 MG/DL
HEMOGLOBIN: 14.6 G/DL (ref 13.5–17)
LDL CHOLESTEROL CALCULATED: 70 MG/DL
LDL/HDL RATIO: 1.2 RATIO
LYMPHOCYTE %: 36.4 %
MCH RBC QN AUTO: 31.3 PG (ref 25–33)
MCHC RBC AUTO-ENTMCNC: 34.3 G/DL (ref 31–36)
MCV RBC AUTO: 91.2 FL (ref 80–99)
MONOCYTES # BLD: 9.6 %
NEUTROPHILS RELATIVE PERCENT: 48.9 %
PDW BLD-RTO: 12.5 % (ref 11.5–15)
PLATELETS: 191 K/UL (ref 130–400)
PMV BLD AUTO: 11.1 FL (ref 9.3–13)
POTASSIUM SERPL-SCNC: 4.8 MEQ/L (ref 3.5–5.4)
RBC: 4.67 M/UL (ref 4.5–6.1)
SODIUM BLD-SCNC: 142 MEQ/L (ref 133–146)
TOTAL PROTEIN: 6.8 G/DL (ref 6.1–8.3)
TRIGL SERPL-MCNC: 96 MG/DL
VLDLC SERPL CALC-MCNC: 19 MG/DL
WBC: 6.8 K/UL (ref 3.5–11)

## 2023-07-31 RX ORDER — BLOOD-GLUCOSE CONTROL, LOW
EACH MISCELLANEOUS
Qty: 1 EACH | Refills: 0 | Status: SHIPPED | OUTPATIENT
Start: 2023-07-31

## 2023-07-31 RX ORDER — GLUCOSAM/CHON-MSM1/C/MANG/BOSW 500-416.6
TABLET ORAL
Qty: 100 EACH | Refills: 3 | Status: SHIPPED | OUTPATIENT
Start: 2023-07-31

## 2023-08-21 RX ORDER — HYOSCYAMINE SULFATE 0.125 MG
0.12 TABLET,DISINTEGRATING ORAL EVERY 4 HOURS PRN
Qty: 360 TABLET | Refills: 1 | Status: SHIPPED | OUTPATIENT
Start: 2023-08-21

## 2023-08-28 ENCOUNTER — OFFICE VISIT (OUTPATIENT)
Dept: UROLOGY | Age: 78
End: 2023-08-28
Payer: MEDICARE

## 2023-08-28 VITALS — HEIGHT: 70 IN | WEIGHT: 234 LBS | BODY MASS INDEX: 33.5 KG/M2 | RESPIRATION RATE: 16 BRPM

## 2023-08-28 DIAGNOSIS — N40.1 BPH WITH OBSTRUCTION/LOWER URINARY TRACT SYMPTOMS: Primary | ICD-10-CM

## 2023-08-28 DIAGNOSIS — N13.8 BPH WITH OBSTRUCTION/LOWER URINARY TRACT SYMPTOMS: Primary | ICD-10-CM

## 2023-08-28 LAB
BILIRUBIN URINE: NEGATIVE
BLOOD URINE, POC: NEGATIVE
CHARACTER, URINE: CLEAR
COLOR, URINE: YELLOW
GLUCOSE URINE: NEGATIVE MG/DL
KETONES, URINE: NEGATIVE
LEUKOCYTE CLUMPS, URINE: NEGATIVE
NITRITE, URINE: NEGATIVE
PH, URINE: 5.5 (ref 5–9)
POST VOID RESIDUAL (PVR): 119 ML
PROTEIN, URINE: NEGATIVE MG/DL
SPECIFIC GRAVITY, URINE: 1.02 (ref 1–1.03)
UROBILINOGEN, URINE: 0.2 EU/DL (ref 0–1)

## 2023-08-28 PROCEDURE — 51798 US URINE CAPACITY MEASURE: CPT

## 2023-08-28 PROCEDURE — G8427 DOCREV CUR MEDS BY ELIG CLIN: HCPCS

## 2023-08-28 PROCEDURE — 99214 OFFICE O/P EST MOD 30 MIN: CPT

## 2023-08-28 PROCEDURE — 1036F TOBACCO NON-USER: CPT

## 2023-08-28 PROCEDURE — 81003 URINALYSIS AUTO W/O SCOPE: CPT

## 2023-08-28 PROCEDURE — 1123F ACP DISCUSS/DSCN MKR DOCD: CPT

## 2023-08-28 PROCEDURE — G8417 CALC BMI ABV UP PARAM F/U: HCPCS

## 2023-08-28 ASSESSMENT — ENCOUNTER SYMPTOMS
SHORTNESS OF BREATH: 0
EYE REDNESS: 0
ABDOMINAL PAIN: 0
EYE DISCHARGE: 0
COLOR CHANGE: 0

## 2023-08-28 NOTE — PROGRESS NOTES
[address] [facility]  [address] [place] [address]  Dept: [phone]  Loc: [phone]    Visit Date: 8/28/2023        HPI:     Damián Puri is a 65 y.o. male who presents today for:  No chief complaint on file. HPI  Mr. Deloise Dakins is a 69-year-old male that follows in the clinic for BPH and urinary retention. 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 He discontinued Flomax. Current Outpatient Medications   Medication Sig Dispense Refill    hyoscyamine (NULEV) 125 MCG TBDP dispersible tablet Take 1 tablet by mouth every 4 hours as needed (abd pain) 360 tablet 1    Blood Glucose Calibration (TRUE METRIX LEVEL 2) Normal SOLN USE AS DIRECTED WITH GLUCOSE METER 1 each 0    Blood Glucose Calibration (TRUE METRIX LEVEL 1) Low SOLN USE AS DIRECTED WITH GLUCOSE METER 1 each 0    TRUEplus Lancets 33G MISC TEST BLOOD SUGAR EVERY DAY AS NEEDED 100 each 3    azithromycin (ZITHROMAX) 500 MG tablet       terbinafine (LAMISIL AT ATHLETES FOOT) 1 % cream Apply topically 2 times daily. 15 g 0    sildenafil (VIAGRA) 100 MG tablet Take 1 tablet by mouth daily as needed for Erectile Dysfunction 30 tablet 1    dicyclomine (BENTYL) 20 MG tablet Take 1 tablet by mouth 2 times daily 180 tablet 3    atorvastatin (LIPITOR) 40 MG tablet Take 1 tablet by mouth nightly 90 tablet 3    metoprolol tartrate (LOPRESSOR) 25 MG tablet Take 1 tablet by mouth daily 90 tablet 3    omeprazole (PRILOSEC) 20 MG delayed release capsule Take one tablet by mouth daily 90 capsule 3    lisinopril (PRINIVIL;ZESTRIL) 10 MG tablet Take 1 tablet by mouth daily 90 tablet 3    blood glucose test strips (GNP TRUE METRIX GLUCOSE STRIPS) strip 1 each by In Vitro route daily As needed.  50 each 3    blood glucose monitor kit

## 2023-08-28 NOTE — PATIENT INSTRUCTIONS
Follow-up in 3 months with PVR and for surveillance cystoscopy. Get PSA   Call with questions, comments, or concerns. I recommend going to the ED for further evaluation if you develop fever, chills, nausea, vomiting, chest pain, SOB, or calf pain.

## 2023-12-05 LAB
ALBUMIN SERPL-MCNC: 4.5 G/DL (ref 3.5–5.2)
ALK PHOSPHATASE: 87 U/L (ref 40–125)
ALT SERPL-CCNC: 23 U/L (ref 5–50)
ANION GAP SERPL CALCULATED.3IONS-SCNC: 11 MEQ/L (ref 7–16)
AST SERPL-CCNC: 26 U/L (ref 9–50)
AVERAGE GLUCOSE: 123 MG/DL
BILIRUB SERPL-MCNC: 2 MG/DL
BUN BLDV-MCNC: 20 MG/DL (ref 8–23)
CALCIUM SERPL-MCNC: 9.3 MG/DL (ref 8.5–10.5)
CHLORIDE BLD-SCNC: 104 MEQ/L (ref 95–107)
CHOLESTEROL/HDL RATIO: 2.2 RATIO
CHOLESTEROL: 147 MG/DL
CO2: 26 MEQ/L (ref 19–31)
CREAT SERPL-MCNC: 0.97 MG/DL (ref 0.8–1.4)
EGFR IF NONAFRICAN AMERICAN: 79 ML/MIN/1.73
GLUCOSE: 116 MG/DL (ref 70–99)
HBA1C MFR BLD: 5.9 % (ref 4.2–5.6)
HDLC SERPL-MCNC: 67 MG/DL
LDL CHOLESTEROL CALCULATED: 63 MG/DL
LDL/HDL RATIO: 0.9 RATIO
POTASSIUM SERPL-SCNC: 4.4 MEQ/L (ref 3.5–5.4)
PSA, ULTRASENSITIVE: 0.53 NG/ML
SODIUM BLD-SCNC: 141 MEQ/L (ref 133–146)
TOTAL PROTEIN: 7.1 G/DL (ref 6.1–8.3)
TRIGL SERPL-MCNC: 86 MG/DL
VLDLC SERPL CALC-MCNC: 17 MG/DL

## 2023-12-08 ENCOUNTER — TELEPHONE (OUTPATIENT)
Dept: UROLOGY | Age: 78
End: 2023-12-08

## 2023-12-12 ENCOUNTER — OFFICE VISIT (OUTPATIENT)
Dept: FAMILY MEDICINE CLINIC | Age: 78
End: 2023-12-12
Payer: MEDICARE

## 2023-12-12 VITALS
SYSTOLIC BLOOD PRESSURE: 118 MMHG | HEART RATE: 63 BPM | WEIGHT: 235.6 LBS | DIASTOLIC BLOOD PRESSURE: 72 MMHG | BODY MASS INDEX: 33.81 KG/M2 | TEMPERATURE: 98.7 F | OXYGEN SATURATION: 96 %

## 2023-12-12 DIAGNOSIS — R73.9 HYPERGLYCEMIA: ICD-10-CM

## 2023-12-12 DIAGNOSIS — B35.3 TINEA PEDIS OF LEFT FOOT: ICD-10-CM

## 2023-12-12 DIAGNOSIS — M75.52 SUBACROMIAL BURSITIS OF LEFT SHOULDER JOINT: Primary | ICD-10-CM

## 2023-12-12 DIAGNOSIS — I25.83 CORONARY ARTERY DISEASE DUE TO LIPID RICH PLAQUE: ICD-10-CM

## 2023-12-12 DIAGNOSIS — I10 ESSENTIAL HYPERTENSION, BENIGN: ICD-10-CM

## 2023-12-12 DIAGNOSIS — I25.10 CORONARY ARTERY DISEASE DUE TO LIPID RICH PLAQUE: ICD-10-CM

## 2023-12-12 DIAGNOSIS — E78.5 HYPERLIPIDEMIA, UNSPECIFIED HYPERLIPIDEMIA TYPE: ICD-10-CM

## 2023-12-12 PROCEDURE — G8427 DOCREV CUR MEDS BY ELIG CLIN: HCPCS | Performed by: FAMILY MEDICINE

## 2023-12-12 PROCEDURE — 3078F DIAST BP <80 MM HG: CPT | Performed by: FAMILY MEDICINE

## 2023-12-12 PROCEDURE — G8484 FLU IMMUNIZE NO ADMIN: HCPCS | Performed by: FAMILY MEDICINE

## 2023-12-12 PROCEDURE — 99214 OFFICE O/P EST MOD 30 MIN: CPT | Performed by: FAMILY MEDICINE

## 2023-12-12 PROCEDURE — 1123F ACP DISCUSS/DSCN MKR DOCD: CPT | Performed by: FAMILY MEDICINE

## 2023-12-12 PROCEDURE — 3074F SYST BP LT 130 MM HG: CPT | Performed by: FAMILY MEDICINE

## 2023-12-12 PROCEDURE — 1036F TOBACCO NON-USER: CPT | Performed by: FAMILY MEDICINE

## 2023-12-12 PROCEDURE — 96372 THER/PROPH/DIAG INJ SC/IM: CPT | Performed by: FAMILY MEDICINE

## 2023-12-12 PROCEDURE — G8417 CALC BMI ABV UP PARAM F/U: HCPCS | Performed by: FAMILY MEDICINE

## 2023-12-12 RX ORDER — NITROGLYCERIN 0.4 MG/1
0.4 TABLET SUBLINGUAL EVERY 5 MIN PRN
Qty: 25 TABLET | Refills: 1 | Status: CANCELLED | OUTPATIENT
Start: 2023-12-12

## 2023-12-12 RX ORDER — CLOTRIMAZOLE AND BETAMETHASONE DIPROPIONATE 10; .64 MG/G; MG/G
CREAM TOPICAL
Qty: 45 G | Refills: 0 | Status: SHIPPED | OUTPATIENT
Start: 2023-12-12

## 2023-12-12 RX ORDER — LISINOPRIL 10 MG/1
10 TABLET ORAL DAILY
Qty: 90 TABLET | Refills: 3 | Status: SHIPPED | OUTPATIENT
Start: 2023-12-12

## 2023-12-12 RX ORDER — METHYLPREDNISOLONE ACETATE 80 MG/ML
160 INJECTION, SUSPENSION INTRA-ARTICULAR; INTRALESIONAL; INTRAMUSCULAR; SOFT TISSUE ONCE
Status: COMPLETED | OUTPATIENT
Start: 2023-12-12 | End: 2023-12-12

## 2023-12-12 RX ORDER — AZITHROMYCIN 500 MG/1
500 TABLET, FILM COATED ORAL PRN
Qty: 4 TABLET | Refills: 0 | Status: SHIPPED | OUTPATIENT
Start: 2023-12-12

## 2023-12-12 RX ADMIN — METHYLPREDNISOLONE ACETATE 160 MG: 80 INJECTION, SUSPENSION INTRA-ARTICULAR; INTRALESIONAL; INTRAMUSCULAR; SOFT TISSUE at 14:22

## 2023-12-13 ASSESSMENT — ENCOUNTER SYMPTOMS
NAUSEA: 0
RHINORRHEA: 0
VOMITING: 0
DIARRHEA: 0
SORE THROAT: 0
COUGH: 0
SHORTNESS OF BREATH: 0
BACK PAIN: 0
WHEEZING: 0
CONSTIPATION: 0
ABDOMINAL PAIN: 0

## 2024-01-03 ENCOUNTER — TELEPHONE (OUTPATIENT)
Dept: FAMILY MEDICINE CLINIC | Age: 79
End: 2024-01-03

## 2024-01-03 DIAGNOSIS — B35.3 TINEA PEDIS OF LEFT FOOT: Primary | ICD-10-CM

## 2024-01-03 NOTE — TELEPHONE ENCOUNTER
----- Message from Facundo Monsivais sent at 1/2/2024  9:34 PM EST -----  Regarding: Clotrim/Beta cream 1-0.05cre tar  Contact: [phone]  The subject prescriptions from 12-12-23 does not heal the open crack  between my toes, still the same,open crack,you told me to let you know after 2 weeks of twice a day applying.  Thanks  Ed

## 2024-01-08 ENCOUNTER — HOSPITAL ENCOUNTER (OUTPATIENT)
Dept: UROLOGY | Age: 79
Discharge: HOME | End: 2024-01-08
Payer: MEDICARE

## 2024-01-08 VITALS
HEART RATE: 60 BPM | OXYGEN SATURATION: 98 % | WEIGHT: 232.2 LBS | SYSTOLIC BLOOD PRESSURE: 141 MMHG | RESPIRATION RATE: 16 BRPM | TEMPERATURE: 97.8 F | BODY MASS INDEX: 31.45 KG/M2 | HEIGHT: 72 IN | DIASTOLIC BLOOD PRESSURE: 87 MMHG

## 2024-01-08 LAB
BILIRUBIN URINE: NEGATIVE
BLOOD URINE, POC: ABNORMAL
CHARACTER, URINE: CLEAR
COLOR, URINE: YELLOW
GLUCOSE URINE: NEGATIVE MG/DL
KETONES, URINE: NEGATIVE
LEUKOCYTE CLUMPS, URINE: ABNORMAL
NITRITE, URINE: NEGATIVE
PH, URINE: 5.5 (ref 5–9)
PROTEIN, URINE: NEGATIVE MG/DL
SPECIFIC GRAVITY, URINE: 1.02 (ref 1–1.03)
UROBILINOGEN, URINE: 0.2 EU/DL (ref 0–1)

## 2024-01-08 PROCEDURE — 52000 CYSTOURETHROSCOPY: CPT

## 2024-01-08 PROCEDURE — 87086 URINE CULTURE/COLONY COUNT: CPT

## 2024-01-08 RX ORDER — DOXYCYCLINE HYCLATE 100 MG
100 TABLET ORAL 2 TIMES DAILY
Qty: 6 TABLET | Refills: 0 | Status: SHIPPED | OUTPATIENT
Start: 2024-01-08 | End: 2024-01-11

## 2024-01-08 NOTE — DISCHARGE INSTRUCTIONS
Discharge instructions: Cystoscopy  You May experience painful urination and see blood in the urine after your procedure.  This should resolve over time.      Pt ok to discharge home in good condition  No heavy lifting, >10 lbs for today  Pt should avoid strenuous activity for today  Pt should walk moderately at home  Pt ok to shower   Pt may resume diet as tolerated  Please call attending physician or hospital  with questions  Call or Present to ED if fever (> 101F), intractable nausea vomiting or pain.    Begin taking doxycycline    Bladder tumor removal.  Office will call to schedule.

## 2024-01-08 NOTE — OP NOTE
Cystoscopy Operative Note - PVP (8/2023)   Surgeon: Reynold Walker Jr, MD   Anesthesia: Urethral 2%  Indications: Hematuria, BPH  Position: supine  EBL: 1 cc  Specimen: none  Findings:   The patient was prepped and draped in the usual sterile fashion.  The flexible cystoscope was advanced through the urethra and into the bladder.  The bladder was thoroughly inspected and the following was noted:    Residual Urine:  Moderate   Urethra: No abnormalities of the urethra are noted.  Prostate:  Patent prostate fossa, no BNC no calcificiations  Bladder:  Small papillary lesions on the posterior bladder wall.  No tumors or CIS noted.  No bladder diverticulum.    Moderate  trabeculation noted.  Ureters: Clear efflux from both ureters.  Orifices with normal configuration and location.  The cystoscope was removed.  The patient tolerated the procedure well.   Plan: Good candidate for TURBT and Gemcitibine

## 2024-01-08 NOTE — PROGRESS NOTES
[facility] in [facility] for [facility]  This procedure has been fully reviewed with the patient and written informed consent has been obtained.  0948 Procedure started with Dr. Walker  0949 Procedure completed; patient tolerated well.  Dr. Walker talked to patient in length about procedure findings.  Patient discharged.    PLAN     TURBT and Gemcitibine.  Office will call to schedule.

## 2024-01-08 NOTE — H&P
Use    Smoking status: Former     Current packs/day: 0.00     Average packs/day: 1 pack/day for 25.0 years (25.0 ttl pk-yrs)     Types: Cigarettes     Start date: 1970     Quit date: 1995     Years since quittin.0    Smokeless tobacco: Former   Vaping Use    Vaping Use: Not on file   Substance and Sexual Activity    Alcohol use: Not Currently     Alcohol/week: 1.0 standard drink of alcohol     Types: 1 Cans of beer per week     Comment: seldom    Drug use: No    Sexual activity: Not Currently     Partners: Female   Other Topics Concern    Not on file   Social History Narrative    Not on file     Social Determinants of Health     Financial Resource Strain: Low Risk  (6/10/2023)    Overall Financial Resource Strain (CARDIA)     Difficulty of Paying Living Expenses: Not very hard   Food Insecurity: Not on file (6/10/2023)   Transportation Needs: Unknown (6/10/2023)    PRAPARE - Transportation     Lack of Transportation (Medical): Not on file     Lack of Transportation (Non-Medical): No   Physical Activity: Insufficiently Active (6/10/2023)    Exercise Vital Sign     Days of Exercise per Week: 2 days     Minutes of Exercise per Session: 10 min   Stress: Not on file   Social Connections: Not on file   Intimate Partner Violence: Not on file   Housing Stability: Unknown (6/10/2023)    Housing Stability Vital Sign     Unable to Pay for Housing in the Last Year: Not on file     Number of Places Lived in the Last Year: Not on file     Unstable Housing in the Last Year: No       Family History:    Family History   Problem Relation Age of Onset    Heart Disease Mother     Diabetes Mother     Kidney Disease Father     Cancer Brother         colon       REVIEW OF SYSTEMS:  All systems reviewed and negative except for that already noted in the HPI.    Physical Exam:      Patient Vitals for the past 24 hrs:   BP Temp Temp src Pulse Resp SpO2 Height Weight   24 0921 (!) 141/87 97.8 °F (36.6 °C) Temporal 60 16 98 %

## 2024-01-09 ENCOUNTER — TELEPHONE (OUTPATIENT)
Dept: UROLOGY | Age: 79
End: 2024-01-09

## 2024-01-09 DIAGNOSIS — Z01.818 PRE-OP TESTING: ICD-10-CM

## 2024-01-09 DIAGNOSIS — C67.8 MALIGNANT NEOPLASM OF OVERLAPPING SITES OF BLADDER (HCC): Primary | ICD-10-CM

## 2024-01-09 LAB
BACTERIA UR CULT: ABNORMAL
ORGANISM: ABNORMAL

## 2024-01-09 NOTE — TELEPHONE ENCOUNTER
No pre cert needed for CPT 04347 or  per Lillie BRIDGES with [employer]  Ref # [id number]    No pre cert needed for  per Rhina JENSEN with [employer] [facility]  Ref # [id number]

## 2024-01-09 NOTE — TELEPHONE ENCOUNTER
SURGERY SCHEDULING FORM   [address] [address]      Phone *[phone] *[phone]   Surgical Scheduling Direct Line Phone *[phone] Fax *[phone]      Facundo Monsivais 1945 male    [address] [address]  Marital Status:          Home Phone: [phone]      Cell Phone:    Telephone Information:   Mobile [phone]          Surgeon: Dr. Walker Surgery Date: 1/29/24   Time: 9:30 am    Procedure: Transurethral Resection of Bladder Tumor with installation of Gemcitabine    Diagnosis: Small Papillary Lesions on Posterior Bladder Wall    Important Medical History:  In [facility]    Special Inst/Equip:                GEMCITABINE ORDER SENT TO [facility]    CPT Codes:    57160,   Latex Allergy: No     Cardiac Device:  No    Anesthesia:  General          Admission Type:  Same Day                        Admit Prior to Day of Surgery: No    Case Location:  Main OR            Preadmission Testing:  Phone Call          PAT Date and Time:______________________________________________________    PAT Confirmation #: ______________________________________________________    Post Op Visit: ___________________________________________________________    Need Preop Cardiac Clearance: Yes    Does Patient have Cardiologist/physician?     Dr Wallis    Surgery Confirmation #: __________________________________________________    : ________________________   Date: __________________________     Insurance Company Name: [employer]                                          
DO NOT TAKE  FISH OIL, MOBIC, IBUPROFEN, MOTRIN-LIKE DRUGS AND ANY MULTIVITAMINS OR OVER THE COUNTER SUPPLEMENTS 14 DAYS PRIOR TO SURGERY.    HOLD ASPIRIN 5 DAYS PRIOR TO SURGERY    IF YOU TAKE GLUCOPHAGE, METFORMIN OR JANUMET, HOLD 2 DAYS PRIOR TO SURGERY    MUST HAVE AN ADULT OVER THE AGE OF 18 WITH YOU AT THE TIME OF THE DISCHARGE AND WITH YOU AT HOME AFTER THE PROCEDURE FOR 24 HOURS        Facundo Monsivais 1945     Surgical Physician: Dr. Walker      You have been scheduled for the procedure marked below:      Surgery: Transurethral Resection of Bladder Tumor with installation of Gemcitabine         Date: 1/29/24     Anesthesia: Anesthesiologist (General/Spinal)     Place of Service: [facility] Second Floor Same Day Surgery         Arrive to same day surgery at:  7:30 am  (Surgery time is subject to change)      INSTRUCTIONS AS MARKED BELOW:    1.  DO NOT eat or drink anything after midnight before surgery.  2.  We prefer you shower or bathe with an antibacterial soap (Dial) the morning of surgery.  3  Please bring a current medication list, photo ID and insurance card(s) with you  4. Okay to take Tylenol  5. Take blood pressure or heart medication as directed, if taken in the morning take with a small sip of water  6.The office will call you in 1-2 days after your procedure to schedule a follow up.    DATE SENSITIVE PRE OP TESTING:    TO AVOID YOUR SURGERY BEING CANCELLED DO ON THE DATE LISTED *WALK IN *NO APPOINTMENT.      DO THE PRE OP CBC AND CHEST XRAY ON 1/17/24 AT [facility] TESTING ON 1/17/24. ORDERS INCLUDED        Date: 1/9/2024    
Patient is scheduled for surgery with  on 1/29/24. Surgery consent to be done on arrival. Dr. Wallis to clear. Patient to do pre op fasting labs and chest xray on 1/17/24 Surgery instructions gone over with patient verbally in the office or mailed to the patient.     Gemcitabine order faxed to [facility]    Patient informed an adult over the age of 18 must be with them at the time of surgery, upon discharge and at home for 24 hours after the procedure.   
___Acceptable risk for surgery ___Risk Unacceptable-Communication to Follow  Comments:_____________________________________________________  ________________________________________________________________________  Physician ___________________________  Date:_______________  Physician Printed Name:  _________________________    Fax  [phone]

## 2024-01-10 ENCOUNTER — TELEPHONE (OUTPATIENT)
Dept: UROLOGY | Age: 79
End: 2024-01-10

## 2024-01-10 NOTE — TELEPHONE ENCOUNTER
Patient scheduled for a Transurethral Resection of Bladder Tumor with installation of Gemcitabine with Dr Walker on 1/29/24. Dr Wallis patient. Their office said they sent the clearance request to C[facility] to review for the clearance.

## 2024-01-12 ENCOUNTER — PREP FOR PROCEDURE (OUTPATIENT)
Dept: UROLOGY | Age: 79
End: 2024-01-12

## 2024-01-15 RX ORDER — SODIUM CHLORIDE 0.9 % (FLUSH) 0.9 %
5-40 SYRINGE (ML) INJECTION PRN
Status: CANCELLED | OUTPATIENT
Start: 2024-01-15

## 2024-01-15 RX ORDER — SODIUM CHLORIDE 0.9 % (FLUSH) 0.9 %
5-40 SYRINGE (ML) INJECTION EVERY 12 HOURS SCHEDULED
Status: CANCELLED | OUTPATIENT
Start: 2024-01-15

## 2024-01-15 RX ORDER — SODIUM CHLORIDE 9 MG/ML
INJECTION, SOLUTION INTRAVENOUS PRN
Status: CANCELLED | OUTPATIENT
Start: 2024-01-15

## 2024-01-17 ENCOUNTER — HOSPITAL ENCOUNTER (OUTPATIENT)
Age: 79
Discharge: HOME | End: 2024-01-17
Payer: MEDICARE

## 2024-01-17 ENCOUNTER — OFFICE VISIT (OUTPATIENT)
Dept: CARDIOLOGY CLINIC | Age: 79
End: 2024-01-17

## 2024-01-17 ENCOUNTER — HOSPITAL ENCOUNTER (OUTPATIENT)
Dept: GENERAL RADIOLOGY | Age: 79
Discharge: HOME | End: 2024-01-17
Payer: MEDICARE

## 2024-01-17 VITALS
BODY MASS INDEX: 31.3 KG/M2 | HEIGHT: 72 IN | DIASTOLIC BLOOD PRESSURE: 74 MMHG | SYSTOLIC BLOOD PRESSURE: 122 MMHG | WEIGHT: 231.1 LBS

## 2024-01-17 DIAGNOSIS — Z01.818 PRE-OP TESTING: ICD-10-CM

## 2024-01-17 DIAGNOSIS — C67.8 MALIGNANT NEOPLASM OF OVERLAPPING SITES OF BLADDER (HCC): ICD-10-CM

## 2024-01-17 DIAGNOSIS — I10 ESSENTIAL HYPERTENSION, BENIGN: ICD-10-CM

## 2024-01-17 DIAGNOSIS — Z01.818 PRE-OP EXAM: Primary | ICD-10-CM

## 2024-01-17 DIAGNOSIS — R07.9 CHEST PAIN AT REST: ICD-10-CM

## 2024-01-17 LAB
BASOPHILS ABSOLUTE: 0.1 THOU/MM3 (ref 0–0.1)
BASOPHILS NFR BLD AUTO: 0.5 %
DEPRECATED RDW RBC AUTO: 44.6 FL (ref 35–45)
EOSINOPHIL NFR BLD AUTO: 0.8 %
EOSINOPHILS ABSOLUTE: 0.1 THOU/MM3 (ref 0–0.4)
ERYTHROCYTE [DISTWIDTH] IN BLOOD BY AUTOMATED COUNT: 13 % (ref 11.5–14.5)
HCT VFR BLD AUTO: 43.5 % (ref 42–52)
HGB BLD-MCNC: 14.5 GM/DL (ref 14–18)
IMM GRANULOCYTES # BLD AUTO: 0.05 THOU/MM3 (ref 0–0.07)
IMM GRANULOCYTES NFR BLD AUTO: 0.5 %
LYMPHOCYTES ABSOLUTE: 1.7 THOU/MM3 (ref 1–4.8)
LYMPHOCYTES NFR BLD AUTO: 16.5 %
MCH RBC QN AUTO: 31.4 PG (ref 26–33)
MCHC RBC AUTO-ENTMCNC: 33.3 GM/DL (ref 32.2–35.5)
MCV RBC AUTO: 94.2 FL (ref 80–94)
MONOCYTES ABSOLUTE: 0.8 THOU/MM3 (ref 0.4–1.3)
MONOCYTES NFR BLD AUTO: 8 %
NEUTROPHILS NFR BLD AUTO: 73.7 %
NRBC BLD AUTO-RTO: 0 /100 WBC
PLATELET # BLD AUTO: 207 THOU/MM3 (ref 130–400)
PMV BLD AUTO: 10.8 FL (ref 9.4–12.4)
RBC # BLD AUTO: 4.62 MILL/MM3 (ref 4.7–6.1)
SEGMENTED NEUTROPHILS ABSOLUTE COUNT: 7.5 THOU/MM3 (ref 1.8–7.7)
WBC # BLD AUTO: 10.2 THOU/MM3 (ref 4.8–10.8)

## 2024-01-17 PROCEDURE — 85025 COMPLETE CBC W/AUTO DIFF WBC: CPT

## 2024-01-17 PROCEDURE — 36415 COLL VENOUS BLD VENIPUNCTURE: CPT

## 2024-01-17 PROCEDURE — 71046 X-RAY EXAM CHEST 2 VIEWS: CPT

## 2024-01-17 NOTE — PROGRESS NOTES
All medications, allergies to medication, and pharmacy are updated.    Pt states he is not having any chest pains, SOB or dizziness. Need surgery clearance for Dr. Walker. Also a pt of Dr. Wallis.    EKG completed. Chest XR 01/09/24. Surgery on 01/29/24.  
MD at [facility] OR    CYSTOSCOPY N/A 10/04/2021    CYSTOSCOPY, BLADDER BIOPSY WITH FULGURATION performed by Reynold Walker Jr., MD at [facility] OR    DILATATION, ESOPHAGUS      ENDOSCOPY, COLON, DIAGNOSTIC      EYE SURGERY  2021    HERNIA REPAIR  2005    Dr. Cullen--[facility]    HIP ARTHROPLASTY Right 03/25/2014    right total hip    OTHER SURGICAL HISTORY  08/25/2016    TURBT by Dr Simón NUR N/A 08/26/2022    CYSTO WITH GREENLIGHT PHOTOVAPORIZATION OF THE PROSTATE performed by Reynold Walker Jr., MD at [facility] OR       Review of Systems   Constitutional: Negative for chills and fever  HENT: Negative for congestion, sinus pressure, sneezing and sore throat.    Eyes: Negative for pain, discharge, redness and itching.   Respiratory: Negative for apnea, cough  Gastrointestinal: Negative for blood in stool, constipation, diarrhea   Endocrine: Negative for cold intolerance, heat intolerance, polydipsia.  Genitourinary: Negative for dysuria, enuresis, flank pain and hematuria.   Musculoskeletal: Negative for arthralgias, joint swelling and neck pain.   Neurological: Negative for numbness and headaches.   Psychiatric/Behavioral: Negative for agitation, confusion, decreased concentration and dysphoric mood.     Objective:     There were no vitals filed for this visit.     Wt Readings from Last 3 Encounters:   01/08/24 105.3 kg (232 lb 3.2 oz)   12/12/23 106.9 kg (235 lb 9.6 oz)   08/28/23 106.1 kg (234 lb)     BP Readings from Last 3 Encounters:   01/08/24 (!) 141/87   12/12/23 118/72   06/13/23 118/74       Nursing note and vitals reviewed.    Physical Exam   Constitutional: Oriented to person, place, and time. Appears well-developed and well-nourished.   ENT: Moist mucous membranes. No bleeding. Tongue is midline.    Head: Normocephalic and atraumatic.   Eyes: EOM are normal. Pupils are equal, round, and reactive to light.   Neck: Normal range of motion. Neck supple. No JVD present.   Cardiovascular: Normal rate, regular

## 2024-01-19 NOTE — PROGRESS NOTES
Follow all instructions given by your physician  Do not eat or drink anything after midnight prior to surgery(includes water, chewing gum, mints and ice chips)  Sips of water am of surgery with allowed medications  May brush teeth do not swallow water  Bring insurance info and photo ID  Bring pertinent paperwork with you from Doctor or surgeons's office  Wear clean comfortable, loose-fitting clothing   Case for glasses.  Shower the night before and the morning of surgery with cleansing soap provided or a liquid antibacterial soap, dry with new fresh clean towel after each shower, no lotions, creams or powder.  Clean sheets and pillowcase on bed night before surgery  Bring medications in original bottles  Bring CPAP/BIPAP machine if you have one ( you may be charged if one is needed in recovery room )    Our pharmacy has a Meds to Beds program where they will deliver any new prescriptions you may have to your room before you leave. Our Pharmacy will clear it through your insurance; for example (same co pay). This enables you to take your new RX as soon as you need when you get home and avoids stop/wait delays on the way home.  Please have a form of payment with you and have someone designated as your Pharmacy contact with their phone # as you may not feel well or still be under the influence of anesthesia.    Please refer to the SSI-Surgical Site Infection Flyer you hopefully received in the mail-together we can prevent infections; signs and symptoms reviewed.  When discharged be sure you understand how to care for your wound and that you have the Dr./office phone # to call if you have any concerns or questions about your wound.     needed at discharge and someone over 18 to stay with you for 24 hours overnight (surgery may be cancelled if you don't have this)  Report to [facility] on 2nd floor  If you would become ill prior to surgery, please call the surgeon  May have a visitor with you, we request that you limit to

## 2024-01-29 ENCOUNTER — HOSPITAL ENCOUNTER (OUTPATIENT)
Age: 79
Setting detail: OUTPATIENT SURGERY
Discharge: HOME | End: 2024-01-29
Attending: UROLOGY | Admitting: UROLOGY
Payer: MEDICARE

## 2024-01-29 ENCOUNTER — ANESTHESIA (OUTPATIENT)
Dept: OPERATING ROOM | Age: 79
End: 2024-01-29
Payer: MEDICARE

## 2024-01-29 ENCOUNTER — ANESTHESIA EVENT (OUTPATIENT)
Dept: OPERATING ROOM | Age: 79
End: 2024-01-29
Payer: MEDICARE

## 2024-01-29 VITALS
BODY MASS INDEX: 30.88 KG/M2 | SYSTOLIC BLOOD PRESSURE: 151 MMHG | DIASTOLIC BLOOD PRESSURE: 87 MMHG | RESPIRATION RATE: 16 BRPM | OXYGEN SATURATION: 95 % | HEART RATE: 63 BPM | TEMPERATURE: 96.1 F | HEIGHT: 72 IN | WEIGHT: 228 LBS

## 2024-01-29 DIAGNOSIS — N32.9 LESION OF BLADDER: ICD-10-CM

## 2024-01-29 PROCEDURE — 3600000013 HC SURGERY LEVEL 3 ADDTL 15MIN: Performed by: UROLOGY

## 2024-01-29 PROCEDURE — 7100000010 HC PHASE II RECOVERY - FIRST 15 MIN: Performed by: UROLOGY

## 2024-01-29 PROCEDURE — A4216 STERILE WATER/SALINE, 10 ML: HCPCS | Performed by: UROLOGY

## 2024-01-29 PROCEDURE — 7100000000 HC PACU RECOVERY - FIRST 15 MIN: Performed by: UROLOGY

## 2024-01-29 PROCEDURE — 2580000003 HC RX 258: Performed by: UROLOGY

## 2024-01-29 PROCEDURE — 3700000000 HC ANESTHESIA ATTENDED CARE: Performed by: UROLOGY

## 2024-01-29 PROCEDURE — 7100000001 HC PACU RECOVERY - ADDTL 15 MIN: Performed by: UROLOGY

## 2024-01-29 PROCEDURE — 7100000011 HC PHASE II RECOVERY - ADDTL 15 MIN: Performed by: UROLOGY

## 2024-01-29 PROCEDURE — 6360000002 HC RX W HCPCS: Performed by: NURSE ANESTHETIST, CERTIFIED REGISTERED

## 2024-01-29 PROCEDURE — 6360000002 HC RX W HCPCS: Performed by: UROLOGY

## 2024-01-29 PROCEDURE — 3600000003 HC SURGERY LEVEL 3 BASE: Performed by: UROLOGY

## 2024-01-29 PROCEDURE — 2709999900 HC NON-CHARGEABLE SUPPLY: Performed by: UROLOGY

## 2024-01-29 PROCEDURE — 3700000001 HC ADD 15 MINUTES (ANESTHESIA): Performed by: UROLOGY

## 2024-01-29 PROCEDURE — 2720000010 HC SURG SUPPLY STERILE: Performed by: UROLOGY

## 2024-01-29 PROCEDURE — 88305 TISSUE EXAM BY PATHOLOGIST: CPT

## 2024-01-29 RX ORDER — SODIUM CHLORIDE 0.9 % (FLUSH) 0.9 %
5-40 SYRINGE (ML) INJECTION EVERY 12 HOURS SCHEDULED
Status: DISCONTINUED | OUTPATIENT
Start: 2024-01-29 | End: 2024-01-29 | Stop reason: HOSPADM

## 2024-01-29 RX ORDER — SODIUM CHLORIDE 9 MG/ML
INJECTION, SOLUTION INTRAVENOUS PRN
Status: DISCONTINUED | OUTPATIENT
Start: 2024-01-29 | End: 2024-01-29 | Stop reason: HOSPADM

## 2024-01-29 RX ORDER — LIDOCAINE HYDROCHLORIDE 20 MG/ML
INJECTION, SOLUTION INTRAVENOUS PRN
Status: DISCONTINUED | OUTPATIENT
Start: 2024-01-29 | End: 2024-01-29 | Stop reason: SDUPTHER

## 2024-01-29 RX ORDER — PHENAZOPYRIDINE HYDROCHLORIDE 100 MG/1
100 TABLET, FILM COATED ORAL 3 TIMES DAILY PRN
Qty: 15 TABLET | Refills: 0 | Status: SHIPPED | OUTPATIENT
Start: 2024-01-29 | End: 2024-02-03

## 2024-01-29 RX ORDER — SODIUM CHLORIDE 0.9 % (FLUSH) 0.9 %
5-40 SYRINGE (ML) INJECTION PRN
Status: DISCONTINUED | OUTPATIENT
Start: 2024-01-29 | End: 2024-01-29 | Stop reason: HOSPADM

## 2024-01-29 RX ORDER — DEXAMETHASONE SODIUM PHOSPHATE 10 MG/ML
INJECTION, EMULSION INTRAMUSCULAR; INTRAVENOUS PRN
Status: DISCONTINUED | OUTPATIENT
Start: 2024-01-29 | End: 2024-01-29 | Stop reason: SDUPTHER

## 2024-01-29 RX ORDER — DOXYCYCLINE HYCLATE 100 MG
100 TABLET ORAL 2 TIMES DAILY
Qty: 6 TABLET | Refills: 0 | Status: SHIPPED | OUTPATIENT
Start: 2024-01-29 | End: 2024-02-01

## 2024-01-29 RX ORDER — OXYBUTYNIN CHLORIDE 5 MG/1
5 TABLET, EXTENDED RELEASE ORAL DAILY
Qty: 14 TABLET | Refills: 0 | Status: SHIPPED | OUTPATIENT
Start: 2024-01-29 | End: 2024-02-12

## 2024-01-29 RX ORDER — FENTANYL CITRATE 50 UG/ML
50 INJECTION, SOLUTION INTRAMUSCULAR; INTRAVENOUS EVERY 5 MIN PRN
Status: DISCONTINUED | OUTPATIENT
Start: 2024-01-29 | End: 2024-01-29 | Stop reason: HOSPADM

## 2024-01-29 RX ORDER — ONDANSETRON 2 MG/ML
4 INJECTION INTRAMUSCULAR; INTRAVENOUS
Status: DISCONTINUED | OUTPATIENT
Start: 2024-01-29 | End: 2024-01-29 | Stop reason: HOSPADM

## 2024-01-29 RX ORDER — ONDANSETRON 2 MG/ML
INJECTION INTRAMUSCULAR; INTRAVENOUS PRN
Status: DISCONTINUED | OUTPATIENT
Start: 2024-01-29 | End: 2024-01-29 | Stop reason: SDUPTHER

## 2024-01-29 RX ORDER — FENTANYL CITRATE 50 UG/ML
25 INJECTION, SOLUTION INTRAMUSCULAR; INTRAVENOUS EVERY 5 MIN PRN
Status: DISCONTINUED | OUTPATIENT
Start: 2024-01-29 | End: 2024-01-29 | Stop reason: HOSPADM

## 2024-01-29 RX ORDER — FENTANYL CITRATE 50 UG/ML
INJECTION, SOLUTION INTRAMUSCULAR; INTRAVENOUS PRN
Status: DISCONTINUED | OUTPATIENT
Start: 2024-01-29 | End: 2024-01-29 | Stop reason: SDUPTHER

## 2024-01-29 RX ORDER — MEPERIDINE HYDROCHLORIDE 25 MG/ML
12.5 INJECTION INTRAMUSCULAR; INTRAVENOUS; SUBCUTANEOUS EVERY 5 MIN PRN
Status: DISCONTINUED | OUTPATIENT
Start: 2024-01-29 | End: 2024-01-29 | Stop reason: HOSPADM

## 2024-01-29 RX ORDER — PROPOFOL 10 MG/ML
INJECTION, EMULSION INTRAVENOUS PRN
Status: DISCONTINUED | OUTPATIENT
Start: 2024-01-29 | End: 2024-01-29 | Stop reason: SDUPTHER

## 2024-01-29 RX ADMIN — ONDANSETRON 4 MG: 2 INJECTION INTRAMUSCULAR; INTRAVENOUS at 11:52

## 2024-01-29 RX ADMIN — FENTANYL CITRATE 50 MCG: 50 INJECTION, SOLUTION INTRAMUSCULAR; INTRAVENOUS at 11:26

## 2024-01-29 RX ADMIN — LIDOCAINE HYDROCHLORIDE 160 MG: 20 INJECTION, SOLUTION INTRAVENOUS at 11:26

## 2024-01-29 RX ADMIN — FENTANYL CITRATE 50 MCG: 50 INJECTION, SOLUTION INTRAMUSCULAR; INTRAVENOUS at 11:36

## 2024-01-29 RX ADMIN — SODIUM CHLORIDE: 9 INJECTION, SOLUTION INTRAVENOUS at 08:07

## 2024-01-29 RX ADMIN — Medication 2000 MG: at 11:31

## 2024-01-29 RX ADMIN — DEXAMETHASONE SODIUM PHOSPHATE 6 MG: 10 INJECTION, EMULSION INTRAMUSCULAR; INTRAVENOUS at 11:31

## 2024-01-29 RX ADMIN — PROPOFOL 160 MG: 10 INJECTION, EMULSION INTRAVENOUS at 11:26

## 2024-01-29 ASSESSMENT — PAIN DESCRIPTION - DESCRIPTORS
DESCRIPTORS: BURNING
DESCRIPTORS: BURNING

## 2024-01-29 ASSESSMENT — PAIN - FUNCTIONAL ASSESSMENT
PAIN_FUNCTIONAL_ASSESSMENT: 0-10

## 2024-01-29 ASSESSMENT — PAIN SCALES - GENERAL: PAINLEVEL_OUTOF10: 0

## 2024-01-29 NOTE — ANESTHESIA POSTPROCEDURE EVALUATION
Department of Anesthesiology  Postprocedure Note    Patient: Facundo Monsivais  MRN: [id number]  YOB: 1945  Date of evaluation: 1/29/2024    Procedure Summary       Date: 01/29/24 Room / Location: [facility]  / [facility] OR    Anesthesia Start: 1122 Anesthesia Stop: 1203    Procedure: TURBT WITH INSTILLATION OF GEMCITABINE Diagnosis:       Lesion of bladder      (Lesion of bladder [N32.9])    Surgeons: Reynold Walker Jr., MD Responsible Provider: Balbir Chaudhry DO    Anesthesia Type: general ASA Status: 3            Anesthesia Type: No value filed.    Jayleen Phase I: Jayleen Score: 9    Jayleen Phase II:      Anesthesia Post Evaluation    Patient location during evaluation: PACU  Patient participation: complete - patient participated  Level of consciousness: awake and alert  Pain score: 2  Airway patency: patent  Nausea & Vomiting: no nausea and no vomiting  Cardiovascular status: hemodynamically stable and blood pressure returned to baseline  Respiratory status: spontaneous ventilation, room air and acceptable  Hydration status: stable  Pain management: adequate and satisfactory to patient    No notable events documented.

## 2024-01-29 NOTE — PROGRESS NOTES
1158- pt to pacu, eyes closed, non responsive. VSS. Pt appears in no acute distress.    1205- pt remains non responsive. VSS    1214- pt wakes, reprots some burning around penis tip. Otherwise denies pain, nausea. VSS. Pt request ice chips, tolerates well.     1221- pt resting in bed eyes closed. VSS.    1228- pt meets criteria for discharge from pacu.     1234- Pt returned to [facility] in stable condition. Report given to Reyna

## 2024-01-29 NOTE — PROGRESS NOTES
Patient oriented to Same Day department and admitted to Same Day Surgery room 9.   Patient verbalized approval for first name, last initial with physician name on unit whiteboard.     Plan of care reviewed with patient.   Patient room whiteboard filled out and discussed with patient and responsible adult.   Patient and responsible adult offered Same Day Welcome Packet to review.    Call light in reach.   Bed in lowest position, locked, with one bed rail up.   SCDs and warming blanket in place.  Appropriate arm bands on patient.   Bathroom offered.   All questions and concerns of patient addressed.        Meds to Beds:   Patient informed of [facility] Meds to Beds program during admission. Patient is agreeable to program.   Contact information for the pharmacy and the Meds to Beds program:   Name: daughter   Relationship to patient:child   Phone number: [phone]

## 2024-01-29 NOTE — ANESTHESIA PRE PROCEDURE
Department of Anesthesiology  Preprocedure Note       Name:  Facundo Monsivais   Age:  78 y.o.  :  1945                                          MRN:  [id number]         Date:  2024      Surgeon: Surgeon(s):  Reynold Walker Jr., MD    Procedure: Procedure(s):  TURBT WITH INSTILLATION OF GEMCITABINE    Medications prior to admission:   Prior to Admission medications    Medication Sig Start Date End Date Taking? Authorizing Provider   lisinopril (PRINIVIL;ZESTRIL) 10 MG tablet Take 1 tablet by mouth daily 23   Shaheed Mcmahon MD   azithromycin (ZITHROMAX) 500 MG tablet Take 1 tablet by mouth as needed (as needed for dental procedures) 23   Shaheed Mcmahon MD   hyoscyamine (NULEV) 125 MCG TBDP dispersible tablet Take 1 tablet by mouth every 4 hours as needed (abd pain) 23   Shaheed Mcmahon MD   Blood Glucose Calibration (TRUE METRIX LEVEL 2) Normal SOLN USE AS DIRECTED WITH GLUCOSE METER 23   Shaheed Mcmahon MD   Blood Glucose Calibration (TRUE METRIX LEVEL 1) Low SOLN USE AS DIRECTED WITH GLUCOSE METER 23   Shaheed Mcmahon MD   TRUEplus Lancets 33G MISC TEST BLOOD SUGAR EVERY DAY AS NEEDED 23   Shaheed Mcmahon MD   sildenafil (VIAGRA) 100 MG tablet Take 1 tablet by mouth daily as needed for Erectile Dysfunction 23   Shaheed Mcmahon MD   dicyclomine (BENTYL) 20 MG tablet Take 1 tablet by mouth 2 times daily 4/10/23   Shaheed Mcmahon MD   atorvastatin (LIPITOR) 40 MG tablet Take 1 tablet by mouth nightly 23   Ezio Wallis MD   metoprolol tartrate (LOPRESSOR) 25 MG tablet Take 1 tablet by mouth daily 22   Shaheed Mcmahon MD   omeprazole (PRILOSEC) 20 MG delayed release capsule Take one tablet by mouth daily 22   Shaheed Mcmahon MD   blood glucose test strips (GNP TRUE METRIX GLUCOSE STRIPS) strip 1 each by In Vitro route daily As needed. 22   Shaheed Mcmahon MD   blood glucose monitor kit and supplies Dispense all needed supplies to

## 2024-01-29 NOTE — H&P
[facility]  Urology H&P Note     Patient:  Facundo Monsivais  MRN: [id number]  YOB: 1945    ATTENDING: Reynold Walker Jr, MD     CHIEF COMPLAINT:  Bladder tumor    HISTORY OF PRESENT ILLNESS:   The patient is a 78 y.o. male who presents with bladder tumor    Patient's old records, notes and chart reviewed and summarized above.     Past Medical History:    Past Medical History:   Diagnosis Date    Arthritis     CAD (coronary artery disease)     Chronic obstructive pulmonary disease (HCC) 09/17/2019    GERD (gastroesophageal reflux disease)     Hearing loss 2019    Hyperglycemia 03/18/2016    Hyperlipidemia     Hypertension     IBS (irritable bowel syndrome)     Kidney stones     Malignant neoplasm of trigone of urinary bladder (HCC)     Obstructive sleep apnea     wears cpap    Prostatitis        Past Surgical History:    Past Surgical History:   Procedure Laterality Date    ABDOMEN SURGERY      BACK SURGERY  07/08/2013    Lumbar Coyle L2-S-1, revision, PLIF L5-S-1 w/ grafting    CARDIAC CATHETERIZATION  09/2007 2007 & 2023    COLON SURGERY  2008    Dr. Cullen--[facility]    COLONOSCOPY  ???2020??    Dr. Hamlin    CORONARY ANGIOPLASTY WITH STENT PLACEMENT  2007    CYSTOSCOPY N/A 02/28/2020    CYSTOSCOPY TRANSURETHRAL RESECTION BLADDER TUMOR performed by Juan Miguel Yang MD at [facility] OR    CYSTOSCOPY N/A 10/04/2021    CYSTOSCOPY, BLADDER BIOPSY WITH FULGURATION performed by Reynold Walker Jr., MD at [facility] OR    DILATATION, ESOPHAGUS      ENDOSCOPY, COLON, DIAGNOSTIC      EYE SURGERY  2021    bilateral cataracts    HERNIA REPAIR  2005    Dr. Cullen--[facility]    HIP ARTHROPLASTY Right 03/25/2014    right total hip    JOINT REPLACEMENT      right hip  2014    OTHER SURGICAL HISTORY  08/25/2016    TURBT by Dr Simón NUR N/A 08/26/2022    CYSTO WITH GREENLIGHT PHOTOVAPORIZATION OF THE PROSTATE performed by Reynold Walker Jr., MD at [facility] OR       Medications Prior to Admission:   Prior to Admission

## 2024-01-29 NOTE — DISCHARGE INSTRUCTIONS
Transurethral resection of Bladder Tumor with Installation of Gemcitibine:  The patient will have Gemcitibine instilled into bladder and held for 1 hr in PACU.  They should then empty her bladder before discharge.  Dc catheter before discharge    You may see blood in the urine after the procedure.  This should resolve over the next couple days.  Please stay hydrated.  If the blood in the urine becomes significant, and doesn't improve to a clear/pink appearance, please call.  You may experience frequency/urgency of urination after the procedure.  We expect these symptoms to improve over the next couple weeks.      Tylenol for pain control  Pt ok to discharge home in good condition  No heavy lifting, >10 lbs for today  Pt should avoid strenuous activity for today  Pt should walk moderately at home  Pt ok to shower   Pt may resume diet as tolerated  Pt should take Rx as directed  No driving while on narcotics  Please call attending physician or hospital  with questions  Call or Present to ED if fever (> 101F), intractable nausea vomiting or pain.    If taking, Please hold blood thinning medications for 3-5 days till hematuria improves.      Pt should follow up with Dr. Reynold Walker Jr, MD, 1-2 weeks for pathology

## 2024-01-29 NOTE — OP NOTE
FACILITY:  [place], [place]   Facundo Monsivais  1945  [id number]    DATE: 01/29/24  SURGEON:  Dr. Reynold Walker Jr, MD , MD  ASSISTANT: Dr. Reynold Walker Jr, MD MD  PREOPERATIVE DIAGNOSIS: Bladder tumor  POSTOPERATIVE DIAGNOSIS: Bladder tumor  PROCEDURES PERFORMED:  1.)  Cystoscopy, Transurethral resection of bladder tumor, 3 cm size  2.)  Installation of gemcitibine  DRAINS:  22 fr 3 way mohan catheter  SPECIMEN:  Bladder tumor  ANESTHESIA: General  ESTIMATED BLOOD LOSS: None.   COMPLICATIONS: None.    Indications: Facundo Monsivais is a 78 y.o. male with a prior history of a bladder tumor. All treatment options were discussed. Risks, benefits, goals, alternatives, possible complications were discussed with patient. Patient elected for above mentioned procedures. Consent was signed. Patient elected to proceed.     Details of the procedure: Patient was brought back to operating room, laid in supine position. EPC cuffs were placed on and functioning prior to induction of anesthesia. Antibiotics were administered. GETA was administered. Patient was positioned in dorsolithotomy position and genitals were prepped and draped in sterile fashion. Time out was performed. We placed visual obturator scope within the urethra and into the bladder. A pan-cystoscopy was performed and showed tumor at the: posterior, and dome, approximately 3 cm. We switched to a resectoscope. We then resected the tumor systematically until we reached the stalk. We resected al visible tumor and deep enough to incorporate muscle into the specimen. We then obtained adequate hemostasis. We used Ellik evacuator to remove all specimen. This concluded the case. He tolerated the procedure well. We decided to  a mohan catheter in place. We decided to instill gemcitibine 2 gm/100 ml and dwell for 1 hr after case.      Plan/Follow up:   1-2 weeks for pathology with JOSY: Lawrence  3 months surveillance cysto in office

## 2024-02-06 ENCOUNTER — OFFICE VISIT (OUTPATIENT)
Dept: UROLOGY | Age: 79
End: 2024-02-06
Payer: MEDICARE

## 2024-02-06 VITALS — WEIGHT: 230.4 LBS | HEIGHT: 72 IN | RESPIRATION RATE: 16 BRPM | BODY MASS INDEX: 31.21 KG/M2

## 2024-02-06 DIAGNOSIS — C67.8 MALIGNANT NEOPLASM OF OVERLAPPING SITES OF BLADDER (HCC): Primary | ICD-10-CM

## 2024-02-06 DIAGNOSIS — N52.9 ERECTILE DYSFUNCTION, UNSPECIFIED ERECTILE DYSFUNCTION TYPE: ICD-10-CM

## 2024-02-06 DIAGNOSIS — N40.1 BPH WITH OBSTRUCTION/LOWER URINARY TRACT SYMPTOMS: ICD-10-CM

## 2024-02-06 DIAGNOSIS — N13.8 BPH WITH OBSTRUCTION/LOWER URINARY TRACT SYMPTOMS: ICD-10-CM

## 2024-02-06 PROCEDURE — 1036F TOBACCO NON-USER: CPT

## 2024-02-06 PROCEDURE — 1123F ACP DISCUSS/DSCN MKR DOCD: CPT

## 2024-02-06 PROCEDURE — G8417 CALC BMI ABV UP PARAM F/U: HCPCS

## 2024-02-06 PROCEDURE — G8427 DOCREV CUR MEDS BY ELIG CLIN: HCPCS

## 2024-02-06 PROCEDURE — 99214 OFFICE O/P EST MOD 30 MIN: CPT

## 2024-02-06 PROCEDURE — G8484 FLU IMMUNIZE NO ADMIN: HCPCS

## 2024-02-06 RX ORDER — OXYBUTYNIN CHLORIDE 5 MG/1
5 TABLET ORAL 2 TIMES DAILY
Qty: 60 TABLET | Refills: 1 | Status: SHIPPED | OUTPATIENT
Start: 2024-02-06

## 2024-02-06 NOTE — PROGRESS NOTES
[facility] [place] [facility]  [facility]OLOG[facility]  [address]  [address]  [facility] [id number]  Dept: [phone]  Loc: [phone]  Visit Date: 2/6/2024    [facility]  Facundo Monsivais is a 78 y.o. male that presents to the urology clinic for post-op check and pathology review.    Mr. Monsivais is S/P TURBT w/ Gemcitabine Instillation by Dr. Walker on 1/29/24. Tumor measuring ~ 3 cm. 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     Patient's initial diagnosis: low grade urothelial carcinoma in 2017 with history of recurrence in February of 2020. No history of BCG.    S/P Greenlight Photovaporization of the Prostate for BPH (08/26/22). Off Flomax. Stable LUTS- patient denies worsening urinary stream vs previous visits.    Sildenafil 100 mg PRN per PCP for ED. Helping.    Most Recent PSA: 0.53   (12/04/23)      Last BUN and creatinine:  Lab Results   Component Value Date    BUN 20 12/04/2023     Lab Results   Component Value Date    CREATININE 0.97 12/04/2023           PAST MEDICAL, FAMILY AND SOCIAL HISTORY UPDATE:  Past Medical History:   Diagnosis Date    Arthritis     CAD (coronary artery disease)     Chronic obstructive pulmonary disease (HCC) 09/17/2019    GERD (gastroesophageal reflux disease)     Hearing loss 2019    Hyperglycemia 03/18/2016    Hyperlipidemia     Hypertension     IBS (irritable bowel syndrome)     Kidney stones     Malignant neoplasm of trigone of urinary bladder (HCC)     Obstructive sleep apnea     wears cpap    Prostatitis      Past Surgical History:   Procedure Laterality Date    ABDOMEN SURGERY      BACK SURGERY  07/08/2013    Lumbar Coyle L2-S-1, revision, PLIF L5-S-1 w/ grafting    CARDIAC CATHETERIZATION  09/2007 2007 & 2023    COLON SURGERY  2008    Dr. Cullen--[facility]    COLONOSCOPY  ???2020??    Dr. Hamlin    CORONARY ANGIOPLASTY WITH STENT PLACEMENT  2007    CYSTOSCOPY N/A 02/28/2020    CYSTOSCOPY TRANSURETHRAL RESECTION BLADDER TUMOR

## 2024-02-06 NOTE — PATIENT INSTRUCTIONS
Oxybutynin 5 mg immediate release prescribed- to be taken once in the morning and once at night.    Low grade, non-invasive urothelial carcinoma (cancer). Same as previous diagnosis.

## 2024-02-12 ENCOUNTER — HOSPITAL ENCOUNTER (OUTPATIENT)
Dept: UROLOGY | Age: 79
Discharge: HOME | End: 2024-02-12

## 2024-02-19 DIAGNOSIS — I10 ESSENTIAL HYPERTENSION, BENIGN: ICD-10-CM

## 2024-02-19 RX ORDER — LISINOPRIL 10 MG/1
10 TABLET ORAL DAILY
Qty: 90 TABLET | Refills: 2 | Status: SHIPPED | OUTPATIENT
Start: 2024-02-19

## 2024-02-20 RX ORDER — ATORVASTATIN CALCIUM 40 MG/1
40 TABLET, FILM COATED ORAL NIGHTLY
Qty: 90 TABLET | Refills: 3 | Status: SHIPPED | OUTPATIENT
Start: 2024-02-20

## 2024-02-20 RX ORDER — ATORVASTATIN CALCIUM 40 MG/1
40 TABLET, FILM COATED ORAL NIGHTLY
Qty: 90 TABLET | Refills: 3 | OUTPATIENT
Start: 2024-02-20

## 2024-02-26 ENCOUNTER — OFFICE VISIT (OUTPATIENT)
Dept: UROLOGY | Age: 79
End: 2024-02-26
Payer: MEDICARE

## 2024-02-26 VITALS — WEIGHT: 231 LBS | BODY MASS INDEX: 31.29 KG/M2 | RESPIRATION RATE: 16 BRPM | HEIGHT: 72 IN

## 2024-02-26 DIAGNOSIS — N40.1 BPH WITH OBSTRUCTION/LOWER URINARY TRACT SYMPTOMS: ICD-10-CM

## 2024-02-26 DIAGNOSIS — C67.8 MALIGNANT NEOPLASM OF OVERLAPPING SITES OF BLADDER (HCC): ICD-10-CM

## 2024-02-26 DIAGNOSIS — N13.8 BPH WITH OBSTRUCTION/LOWER URINARY TRACT SYMPTOMS: ICD-10-CM

## 2024-02-26 DIAGNOSIS — R35.0 URINARY FREQUENCY: Primary | ICD-10-CM

## 2024-02-26 LAB
BILIRUBIN URINE: NEGATIVE
BLOOD URINE, POC: ABNORMAL
CHARACTER, URINE: CLEAR
COLOR, URINE: YELLOW
GLUCOSE URINE: NEGATIVE MG/DL
KETONES, URINE: NEGATIVE
LEUKOCYTE CLUMPS, URINE: ABNORMAL
NITRITE, URINE: NEGATIVE
PH, URINE: 5.5 (ref 5–9)
PROTEIN, URINE: NEGATIVE MG/DL
SPECIFIC GRAVITY, URINE: 1.01 (ref 1–1.03)
UROBILINOGEN, URINE: 0.2 EU/DL (ref 0–1)

## 2024-02-26 PROCEDURE — 1123F ACP DISCUSS/DSCN MKR DOCD: CPT | Performed by: UROLOGY

## 2024-02-26 PROCEDURE — G8484 FLU IMMUNIZE NO ADMIN: HCPCS | Performed by: UROLOGY

## 2024-02-26 PROCEDURE — 99214 OFFICE O/P EST MOD 30 MIN: CPT | Performed by: UROLOGY

## 2024-02-26 PROCEDURE — 1036F TOBACCO NON-USER: CPT | Performed by: UROLOGY

## 2024-02-26 PROCEDURE — G8427 DOCREV CUR MEDS BY ELIG CLIN: HCPCS | Performed by: UROLOGY

## 2024-02-26 PROCEDURE — G8417 CALC BMI ABV UP PARAM F/U: HCPCS | Performed by: UROLOGY

## 2024-02-26 PROCEDURE — 81003 URINALYSIS AUTO W/O SCOPE: CPT | Performed by: UROLOGY

## 2024-02-26 RX ORDER — CIPROFLOXACIN 500 MG/1
500 TABLET, FILM COATED ORAL 2 TIMES DAILY
Qty: 14 TABLET | Refills: 0 | Status: SHIPPED | OUTPATIENT
Start: 2024-02-26 | End: 2024-03-04

## 2024-02-26 NOTE — PROGRESS NOTES
Obstructive sleep apnea     wears cpap    Prostatitis      Past Surgical History:   Procedure Laterality Date    ABDOMEN SURGERY      BACK SURGERY  07/08/2013    Lumbar Coyle L2-S-1, revision, PLIF L5-S-1 w/ grafting    CARDIAC CATHETERIZATION  09/2007 2007 & 2023    COLON SURGERY  2008    Dr. Cullen--[facility]    COLONOSCOPY  ???2020??    Dr. Hamlin    CORONARY ANGIOPLASTY WITH STENT PLACEMENT  2007    CYSTOSCOPY N/A 02/28/2020    CYSTOSCOPY TRANSURETHRAL RESECTION BLADDER TUMOR performed by Jua nMiguel Yang MD at [facility] OR    CYSTOSCOPY N/A 10/04/2021    CYSTOSCOPY, BLADDER BIOPSY WITH FULGURATION performed by Reynold Walker Jr., MD at [facility] OR    CYSTOSCOPY N/A 1/29/2024    TURBT WITH INSTILLATION OF GEMCITABINE performed by Reynold Walker Jr., MD at [facility] OR    DILATATION, ESOPHAGUS      ENDOSCOPY, COLON, DIAGNOSTIC      EYE SURGERY  2021    bilateral cataracts    HERNIA REPAIR  2005    Dr. Cullen--[facility]    HIP ARTHROPLASTY Right 03/25/2014    right total hip    JOINT REPLACEMENT      right hip  2014    OTHER SURGICAL HISTORY  08/25/2016    TURBT by Dr Simón NUR N/A 08/26/2022    CYSTO WITH GREENLIGHT PHOTOVAPORIZATION OF THE PROSTATE performed by Reynold Walker Jr., MD at [facility] OR     Family History   Problem Relation Age of Onset    Heart Disease Mother     Diabetes Mother     Kidney Disease Father     Cancer Brother         colon     Outpatient Medications Marked as Taking for the 2/26/24 encounter (Office Visit) with Reynold Walker Jr., MD   Medication Sig Dispense Refill    atorvastatin (LIPITOR) 40 MG tablet Take 1 tablet by mouth nightly 90 tablet 3    lisinopril (PRINIVIL;ZESTRIL) 10 MG tablet Take 1 tablet by mouth daily 90 tablet 2    oxyBUTYnin (DITROPAN) 5 MG tablet Take 1 tablet by mouth 2 times daily 60 tablet 1    azithromycin (ZITHROMAX) 500 MG tablet Take 1 tablet by mouth as needed (as needed for dental procedures) 4 tablet 0    hyoscyamine (NULEV) 125 MCG TBDP dispersible tablet

## 2024-02-28 LAB
BACTERIA UR CULT: ABNORMAL
ORGANISM: ABNORMAL

## 2024-03-06 RX ORDER — NITROGLYCERIN 0.4 MG/1
TABLET SUBLINGUAL
Qty: 25 TABLET | Refills: 3 | Status: SHIPPED | OUTPATIENT
Start: 2024-03-06

## 2024-03-06 RX ORDER — NITROGLYCERIN 0.4 MG/1
0.4 TABLET SUBLINGUAL EVERY 5 MIN PRN
COMMUNITY
End: 2024-03-06 | Stop reason: SDUPTHER

## 2024-03-06 NOTE — TELEPHONE ENCOUNTER
PT ASKED FOR REFILL AND IS NOT ON MED LIST WAS REFILLED BY TAL 6/25/23    PT BROUGHT BOTTLE asking for nitrostat only.    PT WAS LAST SEEN 1/17/24

## 2024-03-07 RX ORDER — OMEPRAZOLE 20 MG/1
CAPSULE, DELAYED RELEASE ORAL
Qty: 90 CAPSULE | Refills: 2 | Status: SHIPPED | OUTPATIENT
Start: 2024-03-07

## 2024-03-17 ENCOUNTER — PATIENT MESSAGE (OUTPATIENT)
Dept: UROLOGY | Age: 79
End: 2024-03-17

## 2024-03-18 ENCOUNTER — NURSE ONLY (OUTPATIENT)
Dept: UROLOGY | Age: 79
End: 2024-03-18

## 2024-03-18 DIAGNOSIS — C67.8 MALIGNANT NEOPLASM OF OVERLAPPING SITES OF BLADDER (HCC): ICD-10-CM

## 2024-03-18 DIAGNOSIS — R35.0 URINARY FREQUENCY: Primary | ICD-10-CM

## 2024-03-18 PROCEDURE — 90000 NO LOS: CPT

## 2024-03-18 RX ORDER — PHENAZOPYRIDINE HYDROCHLORIDE 99.5 MG/1
TABLET ORAL
COMMUNITY
End: 2024-03-21 | Stop reason: ALTCHOICE

## 2024-03-18 NOTE — TELEPHONE ENCOUNTER
From: Facundo Monsivais  To: Dr. Reynold Walker  Sent: 3/17/2024 12:10 AM EDT  Subject: Urination burning    Finished ciprofloxacn 500mg, continue to have burning when urinating,and after.Taking Oxybutynin 5mg,which seems to cause me constpation.  Thanks Papi Monsivais

## 2024-03-18 NOTE — PROGRESS NOTES
I have personally verified, reviewed, and approved of these actions as documented by Britney Ochoa MA.

## 2024-03-20 ENCOUNTER — TELEPHONE (OUTPATIENT)
Dept: UROLOGY | Age: 79
End: 2024-03-20

## 2024-03-21 RX ORDER — PHENAZOPYRIDINE HYDROCHLORIDE 100 MG/1
100 TABLET, FILM COATED ORAL 3 TIMES DAILY PRN
Qty: 9 TABLET | Refills: 0 | Status: SHIPPED | OUTPATIENT
Start: 2024-03-21 | End: 2024-03-24

## 2024-03-21 NOTE — TELEPHONE ENCOUNTER
He can try pyridium for dysuria. Will send script.  Make sure he is aware can change urine color.    Did previous cipro help the dysuria?  Does he have a good stream?  Urine guidance was negative for infection.   If symptoms persist may need to follow up.

## 2024-03-21 NOTE — TELEPHONE ENCOUNTER
Patient stated the stream is decent and the cipro did not help. If the pyridium does not help he will make a follow up appointment.

## 2024-03-21 NOTE — TELEPHONE ENCOUNTER
Patient wife was advised of the message and will have him  the pyridium. She is aware it will turn the urine orange.    She will have the patient call back with information regarding his stream and if the cipro helped.

## 2024-03-21 NOTE — TELEPHONE ENCOUNTER
Patient advised the Guidance test was negative. He was wondering if the prostate could be causing the burning. Is there anything he can take for the burning?

## 2024-03-25 RX ORDER — OMEPRAZOLE 20 MG/1
CAPSULE, DELAYED RELEASE ORAL
Qty: 90 CAPSULE | Refills: 2 | Status: CANCELLED | OUTPATIENT
Start: 2024-03-25

## 2024-04-15 ENCOUNTER — PATIENT MESSAGE (OUTPATIENT)
Dept: FAMILY MEDICINE CLINIC | Age: 79
End: 2024-04-15

## 2024-04-15 DIAGNOSIS — G47.33 OBSTRUCTIVE SLEEP APNEA ON CPAP: Primary | ICD-10-CM

## 2024-04-15 NOTE — TELEPHONE ENCOUNTER
From: Facundo Monsivais  To: Dr. Shaheed Mcmahon  Sent: 4/15/2024 10:39 AM EDT  Subject: New script for sleep apnea supplies    Please send a new perssription to [facility] medical [facility], for me to be able to get sleep apnea supplies. The prescription you sent last year has .  Thanks Papi Monsivais

## 2024-04-22 RX ORDER — DICYCLOMINE HCL 20 MG
20 TABLET ORAL 2 TIMES DAILY
Qty: 180 TABLET | Refills: 2 | Status: SHIPPED | OUTPATIENT
Start: 2024-04-22

## 2024-04-24 ENCOUNTER — TELEPHONE (OUTPATIENT)
Dept: FAMILY MEDICINE CLINIC | Age: 79
End: 2024-04-24

## 2024-04-24 NOTE — TELEPHONE ENCOUNTER
He is scheduled for 4-25-24 at 2:00.  
Requesting nurse visit for a depo medrol for left shoulder pain to be put in left deltoid. Last one was 160 mg on 12-12-23.       Ok for nurse visit?  
ok  
within normal limits

## 2024-04-25 ENCOUNTER — NURSE ONLY (OUTPATIENT)
Dept: FAMILY MEDICINE CLINIC | Age: 79
End: 2024-04-25
Payer: MEDICARE

## 2024-04-25 DIAGNOSIS — M75.52 SUBACROMIAL BURSITIS OF LEFT SHOULDER JOINT: Primary | ICD-10-CM

## 2024-04-25 PROCEDURE — 96372 THER/PROPH/DIAG INJ SC/IM: CPT | Performed by: FAMILY MEDICINE

## 2024-04-25 RX ORDER — METHYLPREDNISOLONE ACETATE 80 MG/ML
160 INJECTION, SUSPENSION INTRA-ARTICULAR; INTRALESIONAL; INTRAMUSCULAR; SOFT TISSUE ONCE
Status: COMPLETED | OUTPATIENT
Start: 2024-04-25 | End: 2024-04-25

## 2024-04-25 RX ADMIN — METHYLPREDNISOLONE ACETATE 160 MG: 80 INJECTION, SUSPENSION INTRA-ARTICULAR; INTRALESIONAL; INTRAMUSCULAR; SOFT TISSUE at 14:02

## 2024-04-25 NOTE — PROGRESS NOTES
Administrations This Visit       methylPREDNISolone acetate (DEPO-MEDROL) injection 160 mg       Admin Date  04/25/2024  14:02 Action  Given Dose  160 mg Route  IntraMUSCular Site  Deltoid Left Administered By  Peyton Cordova CMA ([facility])    Ordering Provider: Shaheed Mcmahon MD    NDC: 6042-4005-12    Lot#: [id number]    : [employer] [place]    Patient Supplied?: No

## 2024-05-13 ENCOUNTER — HOSPITAL ENCOUNTER (OUTPATIENT)
Dept: UROLOGY | Age: 79
Discharge: HOME | End: 2024-05-13
Payer: MEDICARE

## 2024-05-13 VITALS
RESPIRATION RATE: 16 BRPM | TEMPERATURE: 97.5 F | SYSTOLIC BLOOD PRESSURE: 114 MMHG | HEART RATE: 57 BPM | BODY MASS INDEX: 30.62 KG/M2 | OXYGEN SATURATION: 96 % | HEIGHT: 72 IN | WEIGHT: 226.1 LBS | DIASTOLIC BLOOD PRESSURE: 72 MMHG

## 2024-05-13 LAB
BILIRUBIN, URINE: NEGATIVE
BLOOD URINE, POC: NEGATIVE
CHARACTER, URINE: CLEAR
COLOR: YELLOW
GLUCOSE URINE: NEGATIVE MG/DL
KETONES, URINE: NEGATIVE
LEUKOCYTE CLUMPS, URINE: NEGATIVE
NITRITE, URINE: NEGATIVE
PH, URINE: 5.5 (ref 5–9)
PROTEIN, URINE: NEGATIVE MG/DL
SPECIFIC GRAVITY UA: 1.02 (ref 1–1.03)
UROBILINOGEN, URINE: 0.2 EU/DL (ref 0–1)

## 2024-05-13 PROCEDURE — 52000 CYSTOURETHROSCOPY: CPT

## 2024-05-13 RX ORDER — CIPROFLOXACIN 500 MG/1
500 TABLET, FILM COATED ORAL 2 TIMES DAILY
Qty: 6 TABLET | Refills: 0 | Status: SHIPPED | OUTPATIENT
Start: 2024-05-13 | End: 2024-05-16

## 2024-05-13 NOTE — PROGRESS NOTES
Patient arrived in [facility]rology Center for Cystoscopy  This procedure has been fully reviewed with the patient and written informed consent has been obtained.  1440 Procedure started with Dr. Walker  1441 Procedure completed; patient tolerated well.  Dr. Walker talked to patient in length about procedure findings.  Patient discharged.    PLAN     Surveillance cystoscopy scheduled 08/12/2024 at 2:00pm

## 2024-05-13 NOTE — H&P
[facility]  Urology H&P Note     Patient:  Facundo Monsivais  MRN: [id number]  YOB: 1945    ATTENDING: Reynold Walker Jr, MD     CHIEF COMPLAINT:  Unusual bladder symptoms    HISTORY OF PRESENT ILLNESS:   The patient is a 78 y.o. male who presents with unusual bladder symptoms    Patient's old records, notes and chart reviewed and summarized above.     Past Medical History:    Past Medical History:   Diagnosis Date    Arthritis     CAD (coronary artery disease)     Chronic obstructive pulmonary disease (HCC) 09/17/2019    GERD (gastroesophageal reflux disease)     Hearing loss 2019    Hyperglycemia 03/18/2016    Hyperlipidemia     Hypertension     IBS (irritable bowel syndrome)     Kidney stones     Malignant neoplasm of trigone of urinary bladder (HCC)     Obstructive sleep apnea     wears cpap    Prostatitis        Past Surgical History:    Past Surgical History:   Procedure Laterality Date    ABDOMEN SURGERY      BACK SURGERY  07/08/2013    Lumbar Coyle L2-S-1, revision, PLIF L5-S-1 w/ grafting    CARDIAC CATHETERIZATION  09/2007 2007 & 2023    COLON SURGERY  2008    Dr. Cullen--[facility]    COLONOSCOPY  ???2020??    Dr. Hamlin    CORONARY ANGIOPLASTY WITH STENT PLACEMENT  2007    CYSTOSCOPY N/A 02/28/2020    CYSTOSCOPY TRANSURETHRAL RESECTION BLADDER TUMOR performed by Juan Miguel Yang MD at [facility] OR    CYSTOSCOPY N/A 10/04/2021    CYSTOSCOPY, BLADDER BIOPSY WITH FULGURATION performed by Reynold Walker Jr., MD at [facility] OR    CYSTOSCOPY N/A 1/29/2024    TURBT WITH INSTILLATION OF GEMCITABINE performed by Reynold Walker Jr., MD at [facility] OR    DILATATION, ESOPHAGUS      ENDOSCOPY, COLON, DIAGNOSTIC      EYE SURGERY  2021    bilateral cataracts    HERNIA REPAIR  2005    Dr. Cullen--[facility]    HIP ARTHROPLASTY Right 03/25/2014    right total hip    JOINT REPLACEMENT      right hip  2014    OTHER SURGICAL HISTORY  08/25/2016    TURBT by Dr Simón NUR N/A 08/26/2022    CYSTO WITH GREENLIGHT

## 2024-05-13 NOTE — DISCHARGE INSTRUCTIONS
Discharge instructions: Cystoscopy  You May experience painful urination and see blood in the urine after your procedure.  This should resolve over time.      Pt ok to discharge home in good condition  No heavy lifting, >10 lbs for today  Pt should avoid strenuous activity for today  Pt should walk moderately at home  Pt ok to shower   Pt may resume diet as tolerated  Please call attending physician or hospital  with questions  Call or Present to ED if fever (> 101F), intractable nausea vomiting or pain.    Surveillance cystoscopy scheduled 08/12/2024 at 2:00pm

## 2024-05-13 NOTE — OP NOTE
Cystoscopy Operative Note  Surgeon: Reynold Walker Jr, MD   Anesthesia: Urethral 2%  Indications: Bladder tumor, BPH  Position: supine  EBL: 1 cc  Specimen: none  Findings:   The patient was prepped and draped in the usual sterile fashion.  The flexible cystoscope was advanced through the urethra and into the bladder.  The bladder was thoroughly inspected and the following was noted:    Residual Urine:  Moderate   Urethra: No abnormalities of the urethra are noted.  Prostate:  Medium gland (<80 gm) Complete obstruction by lateral  & median lobe of prostate.  Bladder: No tumors or CIS noted.  No bladder diverticulum.   Moderate  trabeculation noted.  Ureters: Clear efflux from both ureters.  Orifices with normal configuration and location.  The cystoscope was removed.  The patient tolerated the procedure well.   Plan: Good candidate for 3 months cysto

## 2024-06-10 LAB
ALBUMIN: 4.5 G/DL (ref 3.5–5.2)
ALK PHOSPHATASE: 81 U/L (ref 40–125)
ALT SERPL-CCNC: 20 U/L (ref 5–50)
ANION GAP SERPL CALCULATED.3IONS-SCNC: 13 MEQ/L (ref 7–16)
AST SERPL-CCNC: 22 U/L (ref 9–50)
BASOPHILS ABSOLUTE: 0.05 K/UL (ref 0–0.2)
BASOPHILS RELATIVE PERCENT: 0.6 %
BILIRUB SERPL-MCNC: 1.7 MG/DL
BUN BLDV-MCNC: 18 MG/DL (ref 8–23)
CALCIUM SERPL-MCNC: 9.2 MG/DL (ref 8.5–10.5)
CHLORIDE BLD-SCNC: 106 MEQ/L (ref 95–107)
CHOLESTEROL, TOTAL: 143 MG/DL
CHOLESTEROL/HDL RATIO: 2.2 RATIO
CO2: 22 MEQ/L (ref 19–31)
CREAT SERPL-MCNC: 0.97 MG/DL (ref 0.8–1.4)
EGFR IF NONAFRICAN AMERICAN: 79 ML/MIN/1.73
EOSINOPHILS ABSOLUTE: 0.16 K/UL (ref 0–0.5)
EOSINOPHILS RELATIVE PERCENT: 2.1 %
ESTIMATED AVERAGE GLUCOSE: 126 MG/DL
GLUCOSE: 136 MG/DL (ref 70–99)
HBA1C MFR BLD: 6 % (ref 4.2–5.6)
HCT VFR BLD CALC: 40.9 % (ref 40–51)
HDLC SERPL-MCNC: 64 MG/DL
HEMOGLOBIN: 14 G/DL (ref 13.5–17)
IMMATURE GRANS (ABS): 0.04
IMMATURE GRANULOCYTES %: 0.5 %
LDL CHOLESTEROL: 64 MG/DL
LDL/HDL RATIO: 1 RATIO
LYMPHOCYTES ABSOLUTE: 2.83 K/UL (ref 1–4)
LYMPHOCYTES RELATIVE PERCENT: 36.7 %
MCH RBC QN AUTO: 31.3 PG (ref 25–33)
MCHC RBC AUTO-ENTMCNC: 34.2 G/DL (ref 31–36)
MCV RBC AUTO: 91.3 FL (ref 80–99)
MONOCYTES ABSOLUTE: 0.75 K/UL (ref 0.2–1)
MONOCYTES RELATIVE PERCENT: 9.7 %
NEUTROPHILS ABSOLUTE: 3.89 K/UL (ref 1.5–7.5)
NEUTROPHILS RELATIVE PERCENT: 50.4 %
PDW BLD-RTO: 12.3 % (ref 11.5–15)
PLATELET # BLD: 207 K/UL (ref 130–400)
PMV BLD AUTO: 11 FL (ref 9.3–13)
POTASSIUM SERPL-SCNC: 4.3 MEQ/L (ref 3.5–5.4)
RBC # BLD: 4.48 M/UL (ref 4.5–6.1)
SODIUM BLD-SCNC: 141 MEQ/L (ref 133–146)
TOTAL PROTEIN: 6.8 G/DL (ref 6.1–8.3)
TRIGL SERPL-MCNC: 77 MG/DL
VLDLC SERPL CALC-MCNC: 15 MG/DL
WBC # BLD: 7.7 K/UL (ref 3.5–11)

## 2024-07-06 SDOH — ECONOMIC STABILITY: HOUSING INSECURITY
IN THE LAST 12 MONTHS, WAS THERE A TIME WHEN YOU DID NOT HAVE A STEADY PLACE TO SLEEP OR SLEPT IN A SHELTER (INCLUDING NOW)?: NO

## 2024-07-06 SDOH — ECONOMIC STABILITY: INCOME INSECURITY: HOW HARD IS IT FOR YOU TO PAY FOR THE VERY BASICS LIKE FOOD, HOUSING, MEDICAL CARE, AND HEATING?: NOT VERY HARD

## 2024-07-06 SDOH — ECONOMIC STABILITY: FOOD INSECURITY: WITHIN THE PAST 12 MONTHS, YOU WORRIED THAT YOUR FOOD WOULD RUN OUT BEFORE YOU GOT MONEY TO BUY MORE.: NEVER TRUE

## 2024-07-06 SDOH — ECONOMIC STABILITY: FOOD INSECURITY: WITHIN THE PAST 12 MONTHS, THE FOOD YOU BOUGHT JUST DIDN'T LAST AND YOU DIDN'T HAVE MONEY TO GET MORE.: NEVER TRUE

## 2024-07-06 SDOH — HEALTH STABILITY: PHYSICAL HEALTH: ON AVERAGE, HOW MANY DAYS PER WEEK DO YOU ENGAGE IN MODERATE TO STRENUOUS EXERCISE (LIKE A BRISK WALK)?: 1 DAY

## 2024-07-06 SDOH — HEALTH STABILITY: PHYSICAL HEALTH: ON AVERAGE, HOW MANY MINUTES DO YOU ENGAGE IN EXERCISE AT THIS LEVEL?: 10 MIN

## 2024-07-06 ASSESSMENT — LIFESTYLE VARIABLES
HOW MANY STANDARD DRINKS CONTAINING ALCOHOL DO YOU HAVE ON A TYPICAL DAY: 1 OR 2
HOW MANY STANDARD DRINKS CONTAINING ALCOHOL DO YOU HAVE ON A TYPICAL DAY: 1
HOW OFTEN DO YOU HAVE A DRINK CONTAINING ALCOHOL: MONTHLY OR LESS
HOW OFTEN DO YOU HAVE SIX OR MORE DRINKS ON ONE OCCASION: 1
HOW OFTEN DO YOU HAVE A DRINK CONTAINING ALCOHOL: 2

## 2024-07-06 ASSESSMENT — PATIENT HEALTH QUESTIONNAIRE - PHQ9
SUM OF ALL RESPONSES TO PHQ QUESTIONS 1-9: 0
SUM OF ALL RESPONSES TO PHQ QUESTIONS 1-9: 0
1. LITTLE INTEREST OR PLEASURE IN DOING THINGS: NOT AT ALL
2. FEELING DOWN, DEPRESSED OR HOPELESS: NOT AT ALL
SUM OF ALL RESPONSES TO PHQ QUESTIONS 1-9: 0
SUM OF ALL RESPONSES TO PHQ QUESTIONS 1-9: 0
SUM OF ALL RESPONSES TO PHQ9 QUESTIONS 1 & 2: 0

## 2024-07-09 ENCOUNTER — OFFICE VISIT (OUTPATIENT)
Dept: FAMILY MEDICINE CLINIC | Age: 79
End: 2024-07-09
Payer: MEDICARE

## 2024-07-09 VITALS
SYSTOLIC BLOOD PRESSURE: 130 MMHG | HEIGHT: 70 IN | HEART RATE: 58 BPM | OXYGEN SATURATION: 97 % | DIASTOLIC BLOOD PRESSURE: 80 MMHG | BODY MASS INDEX: 32.5 KG/M2 | TEMPERATURE: 97.6 F | WEIGHT: 227 LBS | RESPIRATION RATE: 16 BRPM

## 2024-07-09 DIAGNOSIS — I10 ESSENTIAL HYPERTENSION, BENIGN: ICD-10-CM

## 2024-07-09 DIAGNOSIS — R73.9 HYPERGLYCEMIA: ICD-10-CM

## 2024-07-09 DIAGNOSIS — E78.5 HYPERLIPIDEMIA, UNSPECIFIED HYPERLIPIDEMIA TYPE: ICD-10-CM

## 2024-07-09 DIAGNOSIS — Z00.00 MEDICARE ANNUAL WELLNESS VISIT, SUBSEQUENT: Primary | ICD-10-CM

## 2024-07-09 PROCEDURE — 1123F ACP DISCUSS/DSCN MKR DOCD: CPT | Performed by: FAMILY MEDICINE

## 2024-07-09 PROCEDURE — 3079F DIAST BP 80-89 MM HG: CPT | Performed by: FAMILY MEDICINE

## 2024-07-09 PROCEDURE — 3075F SYST BP GE 130 - 139MM HG: CPT | Performed by: FAMILY MEDICINE

## 2024-07-09 PROCEDURE — G0439 PPPS, SUBSEQ VISIT: HCPCS | Performed by: FAMILY MEDICINE

## 2024-07-13 NOTE — PATIENT INSTRUCTIONS
Foods high in fiber can reduce your cholesterol and provide important vitamins and minerals. High-fiber foods include whole-grain cereals and breads, oatmeal, beans, brown rice, citrus fruits, and apples.     Eat lean proteins. Heart-healthy proteins include seafood, lean meats and poultry, eggs, beans, peas, nuts, seeds, and soy products.     Limit drinks and foods with added sugar. These include candy, desserts, and soda pop.   Heart-healthy lifestyle    If your doctor recommends it, get more exercise. For many people, walking is a good choice. Or you may want to swim, bike, or do other activities. Bit by bit, increase the time you're active every day. Try for at least 30 minutes on most days of the week.     Try to quit or cut back on using tobacco and other nicotine products. This includes smoking and vaping. If you need help quitting, talk to your doctor about stop-smoking programs and medicines. These can increase your chances of quitting for good. Quitting is one of the most important things you can do to protect your heart. It is never too late to quit. Try to avoid secondhand smoke too.     Stay at a weight that's healthy for you. Talk to your doctor if you need help losing weight.     Try to get 7 to 9 hours of sleep each night.     Limit alcohol to 2 drinks a day for men and 1 drink a day for women. Too much alcohol can cause health problems.     Manage other health problems such as diabetes, high blood pressure, and high cholesterol. If you think you may have a problem with alcohol or drug use, talk to your doctor.   Medicines    Take your medicines exactly as prescribed. Call your doctor if you think you are having a problem with your medicine.     If your doctor recommends aspirin, take the amount directed each day. Make sure you take aspirin and not another kind of pain reliever, such as acetaminophen (Tylenol).   When should you call for help?   Call 911 if you have symptoms of a heart attack. These

## 2024-07-13 NOTE — PROGRESS NOTES
25 MG tablet Take 1 tablet by mouth daily Yes Shaheed Mcmahon MD   omeprazole (PRILOSEC) 20 MG delayed release capsule Take one tablet by mouth daily Yes Shaheed Mcmahon MD   nitroGLYCERIN (NITROSTAT) 0.4 MG SL tablet up to max of 3 total doses. If no relief after 1 dose, call 911. Yes Eddie Cabrera MD   atorvastatin (LIPITOR) 40 MG tablet Take 1 tablet by mouth nightly Yes Carly Wilkins APRN - CNP   lisinopril (PRINIVIL;ZESTRIL) 10 MG tablet Take 1 tablet by mouth daily Yes Shaheed Mcmahon MD   oxyBUTYnin (DITROPAN) 5 MG tablet Take 1 tablet by mouth 2 times daily Yes Lawrence Luu PA-C   hyoscyamine (NULEV) 125 MCG TBDP dispersible tablet Take 1 tablet by mouth every 4 hours as needed (abd pain) Yes Shaheed Mcmahon MD   Blood Glucose Calibration (TRUE METRIX LEVEL 2) Normal SOLN USE AS DIRECTED WITH GLUCOSE METER Yes Shaheed Mcmahon MD   Blood Glucose Calibration (TRUE METRIX LEVEL 1) Low SOLN USE AS DIRECTED WITH GLUCOSE METER Yes Shaheed Mcmahon MD   TRUEplus Lancets 33G MISC TEST BLOOD SUGAR EVERY DAY AS NEEDED Yes Shaheed Mcmahon MD   sildenafil (VIAGRA) 100 MG tablet Take 1 tablet by mouth daily as needed for Erectile Dysfunction Yes Shaheed Mcmahon MD   blood glucose test strips (GNP TRUE METRIX GLUCOSE STRIPS) strip 1 each by In Vitro route daily As needed. Yes Shaheed Mcmahon MD   blood glucose monitor kit and supplies Dispense all needed supplies to include: control solutions Yes Shaheed Mcmahon MD   Calcium Polycarbophil (FIBER-CAPS PO) Take 2 tablets by mouth daily Yes Av Malcolm MD   aspirin 81 MG EC tablet Take 1 tablet by mouth daily Yes Av Malcolm MD   Multiple Vitamins-Minerals (PRESERVISION AREDS) TABS Take by mouth 2 times daily Yes Av Malcolm MD   UNABLE TO FIND Apple cider vinegar 625mg daily Yes Av Malcolm MD   NONFORMULARY Take 450 mg by mouth 2 times daily Indications: [place] for prostate Yes Av Malcolm MD

## 2024-07-15 ENCOUNTER — TELEPHONE (OUTPATIENT)
Dept: UROLOGY | Age: 79
End: 2024-07-15

## 2024-08-12 ENCOUNTER — HOSPITAL ENCOUNTER (OUTPATIENT)
Dept: UROLOGY | Age: 79
Discharge: HOME | End: 2024-08-12
Payer: MEDICARE

## 2024-08-12 VITALS
HEIGHT: 72 IN | OXYGEN SATURATION: 98 % | SYSTOLIC BLOOD PRESSURE: 131 MMHG | BODY MASS INDEX: 30.34 KG/M2 | HEART RATE: 61 BPM | DIASTOLIC BLOOD PRESSURE: 75 MMHG | TEMPERATURE: 97.5 F | RESPIRATION RATE: 18 BRPM | WEIGHT: 224 LBS

## 2024-08-12 LAB
BILIRUBIN, URINE: NEGATIVE
BLOOD URINE, POC: NEGATIVE
CHARACTER, URINE: CLEAR
COLOR, UA: ABNORMAL
GLUCOSE URINE: NEGATIVE MG/DL
KETONES, URINE: NEGATIVE
LEUKOCYTE CLUMPS, URINE: NEGATIVE
NITRITE, URINE: NEGATIVE
PH, URINE: 6 (ref 5–9)
PROTEIN, URINE: NEGATIVE MG/DL
SPECIFIC GRAVITY UA: 1.02 (ref 1–1.03)
UROBILINOGEN, URINE: 0.2 EU/DL (ref 0–1)

## 2024-08-12 PROCEDURE — 52000 CYSTOURETHROSCOPY: CPT

## 2024-08-12 RX ORDER — DOXYCYCLINE HYCLATE 100 MG
100 TABLET ORAL 2 TIMES DAILY
Qty: 6 TABLET | Refills: 0 | Status: SHIPPED | OUTPATIENT
Start: 2024-08-12 | End: 2024-08-15

## 2024-08-12 NOTE — PROGRESS NOTES
Patient arrived in [facility]rology Center for Cystoscopy  This procedure has been fully reviewed with the patient and written informed consent has been obtained.  1435 Procedure started with Dr. Walker  1436 Procedure completed; patient tolerated well.  Dr. Walker talked to patient in length about procedure findings.  Patient discharged.    PLAN     Surveillance cystoscopy scheduled 02/10/2025 at 1:00pm

## 2024-08-12 NOTE — DISCHARGE INSTRUCTIONS
Discharge instructions: Cystoscopy  You May experience painful urination and see blood in the urine after your procedure.  This should resolve over time.      Pt ok to discharge home in good condition  No heavy lifting, >10 lbs for today  Pt should avoid strenuous activity for today  Pt should walk moderately at home  Pt ok to shower   Pt may resume diet as tolerated  Please call attending physician or hospital  with questions  Call or Present to ED if fever (> 101F), intractable nausea vomiting or pain.    Surveillance cystoscopy scheduled 02/10/2025 at 1:00pm

## 2024-08-12 NOTE — H&P
PHOTOVAPORIZATION OF THE PROSTATE performed by Reynold Walker Jr., MD at [facility] OR       Medications Prior to Admission:   Prior to Admission medications    Medication Sig Start Date End Date Taking? Authorizing Provider   dicyclomine (BENTYL) 20 MG tablet Take 1 tablet by mouth 2 times daily 4/22/24   Shaheed Mcmahon MD   metoprolol tartrate (LOPRESSOR) 25 MG tablet Take 1 tablet by mouth daily 3/7/24   Shaheed Mcmahon MD   omeprazole (PRILOSEC) 20 MG delayed release capsule Take one tablet by mouth daily 3/7/24   Shaheed Mcmahon MD   nitroGLYCERIN (NITROSTAT) 0.4 MG SL tablet up to max of 3 total doses. If no relief after 1 dose, call 911. 3/6/24   Eddie Cabrera MD   atorvastatin (LIPITOR) 40 MG tablet Take 1 tablet by mouth nightly 2/20/24   Carly Wilkins APRN - CNP   lisinopril (PRINIVIL;ZESTRIL) 10 MG tablet Take 1 tablet by mouth daily 2/19/24   Shaheed Mcmahon MD   oxyBUTYnin (DITROPAN) 5 MG tablet Take 1 tablet by mouth 2 times daily 2/6/24   Lawrence Luu PA-C   hyoscyamine (NULEV) 125 MCG TBDP dispersible tablet Take 1 tablet by mouth every 4 hours as needed (abd pain) 8/21/23   Shaheed Mcmahon MD   Blood Glucose Calibration (TRUE METRIX LEVEL 2) Normal SOLN USE AS DIRECTED WITH GLUCOSE METER 7/31/23   Shaheed Mcmahon MD   Blood Glucose Calibration (TRUE METRIX LEVEL 1) Low SOLN USE AS DIRECTED WITH GLUCOSE METER 7/31/23   Shaheed Mcmahon MD   TRUEplus Lancets 33G MISC TEST BLOOD SUGAR EVERY DAY AS NEEDED 7/31/23   Shaheed Mcmahon MD   sildenafil (VIAGRA) 100 MG tablet Take 1 tablet by mouth daily as needed for Erectile Dysfunction 6/13/23   Shaheed Mcmahon MD   blood glucose test strips (GNP TRUE METRIX GLUCOSE STRIPS) strip 1 each by In Vitro route daily As needed. 12/13/22   Shaheed Mcmahon MD   blood glucose monitor kit and supplies Dispense all needed supplies to include: control solutions 12/13/22   Shaheed Mcmahon MD   Calcium Polycarbophil (FIBER-CAPS PO) Take 2 tablets by

## 2024-08-12 NOTE — OP NOTE
Cystoscopy Operative Note  Surgeon: Reynold Walker Jr, MD   Anesthesia: Urethral 2%  Indications:  BPH bladder cancer  Position: supine  EBL: 1 cc  Specimen: none  Findings:   The patient was prepped and draped in the usual sterile fashion.  The flexible cystoscope was advanced through the urethra and into the bladder.  The bladder was thoroughly inspected and the following was noted:    Residual Urine:  Moderate   Urethra: No abnormalities of the urethra are noted.  Prostate:  s/p greenlight PVP  Bladder: No tumors or CIS noted.  No bladder diverticulum.   Moderate  trabeculation noted.  Ureters: Clear efflux from both ureters.  Orifices with normal configuration and location.  The cystoscope was removed.  The patient tolerated the procedure well.   Plan: Good candidate for: 6 months surveillance

## 2024-09-09 RX ORDER — BLOOD-GLUCOSE CONTROL, LOW
EACH MISCELLANEOUS
Qty: 1 EACH | Refills: 0 | Status: SHIPPED | OUTPATIENT
Start: 2024-09-09

## 2024-09-09 RX ORDER — DEXTROSE 3.75 G
1 TABLET,CHEWABLE ORAL DAILY
Qty: 50 EACH | Refills: 11 | Status: SHIPPED | OUTPATIENT
Start: 2024-09-09

## 2024-10-14 RX ORDER — NITROGLYCERIN 0.4 MG/1
TABLET SUBLINGUAL
Qty: 25 TABLET | Refills: 2 | Status: SHIPPED | OUTPATIENT
Start: 2024-10-14

## 2024-10-14 RX ORDER — DICYCLOMINE HCL 20 MG
20 TABLET ORAL 2 TIMES DAILY
Qty: 180 TABLET | Refills: 2 | Status: SHIPPED | OUTPATIENT
Start: 2024-10-14

## 2024-12-02 RX ORDER — METOPROLOL TARTRATE 25 MG/1
25 TABLET, FILM COATED ORAL DAILY
Qty: 90 TABLET | Refills: 2 | Status: SHIPPED | OUTPATIENT
Start: 2024-12-02

## 2024-12-02 RX ORDER — LISINOPRIL 10 MG/1
10 TABLET ORAL DAILY
Qty: 90 TABLET | Refills: 2 | Status: SHIPPED | OUTPATIENT
Start: 2024-12-02

## 2025-01-08 LAB
ALBUMIN: 4.3 G/DL (ref 3.5–5.2)
ALK PHOSPHATASE: 81 U/L (ref 39–118)
ALT SERPL-CCNC: 22 U/L (ref 5–41)
ANION GAP SERPL CALCULATED.3IONS-SCNC: 12 MMOL/L (ref 7–16)
AST SERPL-CCNC: 24 U/L (ref 9–50)
BILIRUB SERPL-MCNC: 1.8 MG/DL
BUN BLDV-MCNC: 21 MG/DL (ref 8–23)
CALCIUM SERPL-MCNC: 8.9 MG/DL (ref 8.6–10.5)
CHLORIDE BLD-SCNC: 106 MMOL/L (ref 96–107)
CO2: 25 MMOL/L (ref 18–32)
CREAT SERPL-MCNC: 1.08 MG/DL (ref 0.67–1.3)
EGFR IF NONAFRICAN AMERICAN: 69 ML/MIN/1.73M2
ESTIMATED AVERAGE GLUCOSE: 134 MG/DL
GLUCOSE: 133 MG/DL (ref 70–100)
HBA1C MFR BLD: 6.3 %
POTASSIUM SERPL-SCNC: 4.4 MMOL/L (ref 3.5–5.4)
SODIUM BLD-SCNC: 143 MMOL/L (ref 135–148)
TOTAL PROTEIN: 6.7 G/DL (ref 6–8.3)

## 2025-01-11 SDOH — ECONOMIC STABILITY: FOOD INSECURITY: WITHIN THE PAST 12 MONTHS, THE FOOD YOU BOUGHT JUST DIDN'T LAST AND YOU DIDN'T HAVE MONEY TO GET MORE.: NEVER TRUE

## 2025-01-11 SDOH — ECONOMIC STABILITY: FOOD INSECURITY: WITHIN THE PAST 12 MONTHS, YOU WORRIED THAT YOUR FOOD WOULD RUN OUT BEFORE YOU GOT MONEY TO BUY MORE.: NEVER TRUE

## 2025-01-11 SDOH — ECONOMIC STABILITY: INCOME INSECURITY: IN THE LAST 12 MONTHS, WAS THERE A TIME WHEN YOU WERE NOT ABLE TO PAY THE MORTGAGE OR RENT ON TIME?: NO

## 2025-01-11 ASSESSMENT — PATIENT HEALTH QUESTIONNAIRE - PHQ9
SUM OF ALL RESPONSES TO PHQ QUESTIONS 1-9: 0
SUM OF ALL RESPONSES TO PHQ9 QUESTIONS 1 & 2: 0
2. FEELING DOWN, DEPRESSED OR HOPELESS: NOT AT ALL
1. LITTLE INTEREST OR PLEASURE IN DOING THINGS: NOT AT ALL
SUM OF ALL RESPONSES TO PHQ9 QUESTIONS 1 & 2: 0
SUM OF ALL RESPONSES TO PHQ QUESTIONS 1-9: 0
1. LITTLE INTEREST OR PLEASURE IN DOING THINGS: NOT AT ALL
2. FEELING DOWN, DEPRESSED OR HOPELESS: NOT AT ALL
SUM OF ALL RESPONSES TO PHQ QUESTIONS 1-9: 0
SUM OF ALL RESPONSES TO PHQ QUESTIONS 1-9: 0

## 2025-01-14 ENCOUNTER — OFFICE VISIT (OUTPATIENT)
Dept: FAMILY MEDICINE CLINIC | Age: 80
End: 2025-01-14

## 2025-01-14 VITALS
RESPIRATION RATE: 18 BRPM | OXYGEN SATURATION: 96 % | WEIGHT: 224.3 LBS | HEART RATE: 64 BPM | BODY MASS INDEX: 30.38 KG/M2 | DIASTOLIC BLOOD PRESSURE: 68 MMHG | SYSTOLIC BLOOD PRESSURE: 104 MMHG | HEIGHT: 72 IN

## 2025-01-14 DIAGNOSIS — I10 ESSENTIAL HYPERTENSION, BENIGN: ICD-10-CM

## 2025-01-14 DIAGNOSIS — C67.8 MALIGNANT NEOPLASM OF OVERLAPPING SITES OF BLADDER (HCC): ICD-10-CM

## 2025-01-14 DIAGNOSIS — R73.9 HYPERGLYCEMIA: Primary | ICD-10-CM

## 2025-01-14 DIAGNOSIS — E78.5 HYPERLIPIDEMIA, UNSPECIFIED HYPERLIPIDEMIA TYPE: ICD-10-CM

## 2025-01-14 SDOH — ECONOMIC STABILITY: FOOD INSECURITY: WITHIN THE PAST 12 MONTHS, THE FOOD YOU BOUGHT JUST DIDN'T LAST AND YOU DIDN'T HAVE MONEY TO GET MORE.: NEVER TRUE

## 2025-01-14 SDOH — ECONOMIC STABILITY: FOOD INSECURITY: WITHIN THE PAST 12 MONTHS, YOU WORRIED THAT YOUR FOOD WOULD RUN OUT BEFORE YOU GOT MONEY TO BUY MORE.: NEVER TRUE

## 2025-01-15 ENCOUNTER — TELEPHONE (OUTPATIENT)
Dept: UROLOGY | Age: 80
End: 2025-01-15

## 2025-01-18 ASSESSMENT — ENCOUNTER SYMPTOMS
ABDOMINAL PAIN: 0
EYE PAIN: 0
COUGH: 0
DIARRHEA: 0
CONSTIPATION: 0
SINUS PRESSURE: 0
NAUSEA: 0
ABDOMINAL DISTENTION: 0
SHORTNESS OF BREATH: 0
SORE THROAT: 0
RHINORRHEA: 0

## 2025-01-18 NOTE — PROGRESS NOTES
DTaP/Tdap/Td vaccine (1 - Tdap) 03/20/2014    Respiratory Syncytial Virus (RSV) Pregnant or age 60 yrs+ (1 - 1-dose 75+ series) Never done    Annual Wellness Visit (Medicare Advantage)  01/01/2025    Lipids  06/10/2025    Depression Screen  01/11/2026    Flu vaccine  Completed    Shingles vaccine  Completed    Pneumococcal 65+ years Vaccine  Completed    COVID-19 Vaccine  Completed    Hepatitis A vaccine  Aged Out    Hepatitis B vaccine  Aged Out    Hib vaccine  Aged Out    Polio vaccine  Aged Out    Meningococcal (ACWY) vaccine  Aged Out    A1C test (Diabetic or Prediabetic)  Discontinued    AAA screen  Discontinued    Colonoscopy  Discontinued         Objective:    Physical Exam  Constitutional:       General: He is not in acute distress.     Appearance: He is well-developed. He is not diaphoretic.   HENT:      Head: Normocephalic and atraumatic.      Right Ear: External ear normal.      Left Ear: External ear normal.   Eyes:      Conjunctiva/sclera: Conjunctivae normal.   Neck:      Vascular: No JVD.   Cardiovascular:      Rate and Rhythm: Normal rate and regular rhythm.      Heart sounds: Normal heart sounds.   Pulmonary:      Effort: Pulmonary effort is normal.      Breath sounds: Normal breath sounds. No wheezing or rales.   Musculoskeletal:         General: No tenderness.   Skin:     General: Skin is warm and dry.      Coloration: Skin is not pale.   Neurological:      Mental Status: He is alert and oriented to person, place, and time.       /68   Pulse 64   Resp 18   Ht 1.829 m (6')   Wt 101.7 kg (224 lb 4.8 oz)   SpO2 96%   BMI 30.42 kg/m²       Patient giveneducational materials - see patient instructions.  Discussed use, benefit, and side effects of prescribed medications.  All patient questions answered.  Pt voiced understanding. Reviewed health maintenance. Patient agreedwith treatment plan. Follow up as directed.   PDMP Monitoring:    Last PDMP Shaheed as Reviewed:  Review User Review Instant

## 2025-02-10 ENCOUNTER — HOSPITAL ENCOUNTER (OUTPATIENT)
Dept: UROLOGY | Age: 80
Discharge: HOME | End: 2025-02-10
Payer: MEDICARE

## 2025-02-10 VITALS
WEIGHT: 224.3 LBS | TEMPERATURE: 97.8 F | BODY MASS INDEX: 30.38 KG/M2 | RESPIRATION RATE: 18 BRPM | OXYGEN SATURATION: 98 % | HEIGHT: 72 IN | HEART RATE: 63 BPM | SYSTOLIC BLOOD PRESSURE: 124 MMHG | DIASTOLIC BLOOD PRESSURE: 73 MMHG

## 2025-02-10 LAB
BILIRUBIN, URINE: NEGATIVE
BLOOD URINE, POC: ABNORMAL
CHARACTER, URINE: ABNORMAL
COLOR, UA: YELLOW
GLUCOSE URINE: NEGATIVE MG/DL
KETONES, URINE: NEGATIVE
LEUKOCYTE CLUMPS, URINE: ABNORMAL
NITRITE, URINE: NEGATIVE
PH, URINE: 6 (ref 5–9)
PROTEIN, URINE: 30 MG/DL
SPECIFIC GRAVITY UA: 1.02 (ref 1–1.03)
UROBILINOGEN, URINE: 0.2 EU/DL (ref 0–1)

## 2025-02-10 PROCEDURE — 52000 CYSTOURETHROSCOPY: CPT

## 2025-02-10 RX ORDER — CIPROFLOXACIN 500 MG/1
500 TABLET, FILM COATED ORAL 2 TIMES DAILY
Qty: 14 TABLET | Refills: 0 | Status: SHIPPED | OUTPATIENT
Start: 2025-02-10 | End: 2025-02-17

## 2025-02-10 NOTE — PROGRESS NOTES
[facility]  This procedure has been fully reviewed with the patient and written informed consent has been obtained.  0943 Procedure started with   0944 Procedure completed; patient tolerated well.  Dr. Walker talked to patient in length about procedure findings.  Patient discharged.    PLAN     Begin taking cipro    Bladder CX test, office will call to schedule.     Follow up with Dr. Walker in 4-6 weeks to review results.      Surveillance cystoscopy scheduled 08/11/2025 at 1:00pm

## 2025-02-10 NOTE — OP NOTE
Cystoscopy Operative Note  Surgeon: Reynold Walker Jr, MD   Anesthesia: Urethral 2%  Indications:  BPH bladder cancer  Position: supine  EBL: 1 cc  Specimen: none  Findings:   The patient was prepped and draped in the usual sterile fashion.  The flexible cystoscope was advanced through the urethra and into the bladder.  The bladder was thoroughly inspected and the following was noted:     Residual Urine:  Moderate   Urethra: No abnormalities of the urethra are noted.  Prostate:  s/p greenlight PVP  Bladder: No tumors or CIS noted.  No bladder diverticulum.   Moderate  trabeculation noted.  Ureters: Clear efflux from both ureters.  Orifices with normal configuration and location.  The cystoscope was removed.  The patient tolerated the procedure well.   Plan: Good candidate for: 6 months surveillance    CX bladder   Abx for 1 week  FU 4-6 weeks to review - if indicated will consider cysto possible biopsy

## 2025-02-10 NOTE — H&P
[facility]  Urology H&P Note     Patient:  Facundo Monsivais  MRN: [id number]  YOB: 1945    ATTENDING: Reynold Walker Jr, MD     CHIEF COMPLAINT:  Unusual bladder symptoms    HISTORY OF PRESENT ILLNESS:   The patient is a 79 y.o. male who presents with unusual bladder symptoms    Patient's old records, notes and chart reviewed and summarized above.     Past Medical History:    Past Medical History:   Diagnosis Date    Arthritis     CAD (coronary artery disease)     Chronic obstructive pulmonary disease (HCC) 09/17/2019    GERD (gastroesophageal reflux disease)     Hearing loss 2019    Hyperglycemia 03/18/2016    Hyperlipidemia     Hypertension     IBS (irritable bowel syndrome)     Kidney stones     Malignant neoplasm of trigone of urinary bladder (HCC)     Obstructive sleep apnea     wears cpap    Prostatitis        Past Surgical History:    Past Surgical History:   Procedure Laterality Date    ABDOMEN SURGERY      BACK SURGERY  07/08/2013    Lumbar Coyle L2-S-1, revision, PLIF L5-S-1 w/ grafting    CARDIAC CATHETERIZATION  09/2007 2007 & 2023    COLON SURGERY  2008    Dr. Cullen--[facility]    COLONOSCOPY  ???2020??    Dr. Hamlin    CORONARY ANGIOPLASTY WITH STENT PLACEMENT  2007    CYSTOSCOPY N/A 02/28/2020    CYSTOSCOPY TRANSURETHRAL RESECTION BLADDER TUMOR performed by Juan Miguel Yang MD at [facility] OR    CYSTOSCOPY N/A 10/04/2021    CYSTOSCOPY, BLADDER BIOPSY WITH FULGURATION performed by Reynold Walker Jr., MD at [facility] OR    CYSTOSCOPY N/A 1/29/2024    TURBT WITH INSTILLATION OF GEMCITABINE performed by Reynold Walker Jr., MD at [facility] OR    DILATATION, ESOPHAGUS      ENDOSCOPY, COLON, DIAGNOSTIC      EYE SURGERY  2021    bilateral cataracts    HERNIA REPAIR  2005    Dr. Cullen--[facility]    HIP ARTHROPLASTY Right 03/25/2014    right total hip    JOINT REPLACEMENT      right hip  2014    OTHER SURGICAL HISTORY  08/25/2016    TURBT by Dr Simón NUR N/A 08/26/2022    CYSTO WITH GREENLIGHT

## 2025-02-10 NOTE — DISCHARGE INSTRUCTIONS
Discharge instructions: Cystoscopy  You May experience painful urination and see blood in the urine after your procedure.  This should resolve over time.      Pt ok to discharge home in good condition  No heavy lifting, >10 lbs for today  Pt should avoid strenuous activity for today  Pt should walk moderately at home  Pt ok to shower   Pt may resume diet as tolerated  Please call attending physician or hospital  with questions  Call or Present to ED if fever (> 101F), intractable nausea vomiting or pain.      Begin taking cipro    Bladder CX test, office will call to schedule.     Follow up in office with Dr. Walker in 4-6 weeks to review results.      Surveillance cystoscopy scheduled 08/11/2025 at 1:00pm

## 2025-02-11 ENCOUNTER — LAB (OUTPATIENT)
Dept: UROLOGY | Age: 80
End: 2025-02-11

## 2025-02-11 DIAGNOSIS — C67.8 MALIGNANT NEOPLASM OF OVERLAPPING SITES OF BLADDER (HCC): Primary | ICD-10-CM

## 2025-02-11 PROCEDURE — NBSRV NON-BILLABLE SERVICE

## 2025-02-11 NOTE — PROGRESS NOTES
I have personally verified, reviewed, and approved these actions.    Pt has an appt with Dr. Walker on 3/24/2025        Thalia Avila PA-C   Urology

## 2025-02-21 ENCOUNTER — TELEPHONE (OUTPATIENT)
Dept: UROLOGY | Age: 80
End: 2025-02-21

## 2025-02-24 ENCOUNTER — OFFICE VISIT (OUTPATIENT)
Dept: UROLOGY | Age: 80
End: 2025-02-24
Payer: MEDICARE

## 2025-02-24 VITALS — WEIGHT: 224 LBS | HEIGHT: 72 IN | RESPIRATION RATE: 20 BRPM | BODY MASS INDEX: 30.34 KG/M2

## 2025-02-24 DIAGNOSIS — C67.8 MALIGNANT NEOPLASM OF OVERLAPPING SITES OF BLADDER (HCC): Primary | ICD-10-CM

## 2025-02-24 PROCEDURE — 1123F ACP DISCUSS/DSCN MKR DOCD: CPT | Performed by: UROLOGY

## 2025-02-24 PROCEDURE — G8427 DOCREV CUR MEDS BY ELIG CLIN: HCPCS | Performed by: UROLOGY

## 2025-02-24 PROCEDURE — G8417 CALC BMI ABV UP PARAM F/U: HCPCS | Performed by: UROLOGY

## 2025-02-24 PROCEDURE — 99214 OFFICE O/P EST MOD 30 MIN: CPT | Performed by: UROLOGY

## 2025-02-24 PROCEDURE — 1159F MED LIST DOCD IN RCRD: CPT | Performed by: UROLOGY

## 2025-02-24 PROCEDURE — 1036F TOBACCO NON-USER: CPT | Performed by: UROLOGY

## 2025-02-24 NOTE — PROGRESS NOTES
Dr. Reynold Walker Jr, MD MD  [facility] Consultation / New Patient Visit    Patient:  Facundo Monsivais  YOB: 1945  Date: 2/24/2025  Consult requested from Shaheed Mcmahon MD     HISTORY OF PRESENT ILLNESS:   The patient is a 79 y.o. male who presents today for follow-up for the following problem(s): history of bladder cancer, BPH with LUTs  Overall the problem(s) : are worsening.  Associated Symptoms: No dysuria, gross hematuria.   Pain Severity:      Today visit:   2/24/25   S/p Greenlight PVP (8/26/22) for BPH (large gland) , post op urinary retention.  Still taking Flomax (0.8 mg).    TURBT - 1/2024 - LG UCC pTa.   - possible recurrence on last cysto.  CX bladder elevated    Summary of old records:   (Patient's old records, notes and chart reviewed and summarized above.)    Sp Cysto bladder biopsy  Bladder, biopsy:             Benign urothelial mucosa with von Brunn's nests.             Negative for malignancy.         Cystoscopy Operative Note  Surgeon: Reynold Wakler Jr, MD   Anesthesia: Urethral 2%  Indications: bladder cancer  Position: supine  Findings:   The patient was prepped and draped in the usual sterile fashion.  The flexible cystoscope was advanced through the urethra and into the bladder.  The bladder was thoroughly inspected and the following was noted:    Summary of old records:   (Patient's old records, notes and chart reviewed and summarized above.)    Residual Urine:  Significant  Urethra: No abnormalities of the urethra are noted.  Prostate: Large gland Complete obstruction by lateral & small median lobe of prostate.  Bladder: No tumors or CIS noted.  No bladder diverticulum. Severe trabeculation noted.  Ureters: Clear efflux from both ureters.  Orifices with normal configuration and location.  The cystoscope was removed.  The patient tolerated the procedure well.  Plan  Cysot bladder biopsy with fulguration  BPH - Greenlight PVP        75-year-old white

## 2025-02-25 DIAGNOSIS — I10 ESSENTIAL HYPERTENSION, BENIGN: ICD-10-CM

## 2025-02-25 RX ORDER — ATORVASTATIN CALCIUM 40 MG/1
40 TABLET, FILM COATED ORAL NIGHTLY
Qty: 90 TABLET | Refills: 3 | Status: SHIPPED | OUTPATIENT
Start: 2025-02-25

## 2025-02-27 ENCOUNTER — TELEPHONE (OUTPATIENT)
Dept: UROLOGY | Age: 80
End: 2025-02-27

## 2025-02-27 DIAGNOSIS — C67.8 MALIGNANT NEOPLASM OF OVERLAPPING SITES OF BLADDER (HCC): ICD-10-CM

## 2025-02-27 NOTE — TELEPHONE ENCOUNTER
DO NOT TAKE  FISH OIL, MOBIC, IBUPROFEN, MOTRIN-LIKE DRUGS AND ANY MULTIVITAMINS OR OVER THE COUNTER SUPPLEMENTS 14 DAYS PRIOR TO SURGERY.    HOLD ASPIRIN 5 DAYS PRIOR TO SURGERY    IF YOU TAKE THE LISINOPRIL IN THE AM HOLD THE MORNING OF SURGERY-IF YOU TAKE IN THE PM HOLD THE EVENING BEFORE SURGERY    IF YOU TAKE GLUCOPHAGE, TRADJENTA, METFORMIN OR JANUMET, HOLD 2 DAYS PRIOR TO SURGERY    MUST HAVE AN ADULT OVER THE AGE OF 18 WITH YOU AT THE TIME OF THE DISCHARGE         Facundo Monsivais 1945     Surgical Physician: Dr. Walker      You have been scheduled for the procedure marked below:      Surgery: Cystoscopy, Bladder Biopsy with Fulgurtion         Date: 4/7/25     Anesthesia:  General     Place of Service: [facility] --Second Floor Same Day Surgery         CALL SURGERY DESK [phone], THE DAY PRIOR TO SURGERY BETWEEN 8AM-4PM, FOR ARRIVAL TIME (IF SURGERY IS ON A MONDAY, CALL THE FRIDAY PRIOR)       INSTRUCTIONS AS MARKED BELOW:    1.  DO NOT eat or drink anything after midnight before surgery.   2.  We prefer you shower or bathe with an antibacterial soap (Dial) the morning of surgery.  3  Please bring a current medication list, photo ID and insurance card(s) with you  4. Okay to take Tylenol  5. Take blood pressure or heart medication as directed, if taken in the morning take with a small sip of water  6.The office will call you in 1-2 days after your procedure to schedule a follow up.    DATE SENSITIVE PRE OP TESTING:    TO AVOID YOUR SURGERY BEING CANCELLED, DO TESTING ON THE DATE LISTED (*WALK IN* NO APPOINTMENT NEEDED).      DO THE PRE OP CBC, EKG AND CHEST XRAY NOW. ORDERS INCLUDED    DO THE PRE OP URINE CULTURE ON 3/24/25. ORDER INCLUDED        Date: 2/27/2025

## 2025-02-27 NOTE — TELEPHONE ENCOUNTER
Patient:   SHANDA MEJIAS            MRN: [id number]            FIN: [id number]              monday  -DIscussed with patient  Raheel Lewis MD   Patient is scheduled for surgery with  on 4/7/25. Surgery consent to be done on arrival. Dr. Cabrera to clear. Patient to do pre op CBC, chest xray and ekg now. Pre op urine culture on 3/24/25.. Surgery instructions mailed to the patient.    Patient informed an adult over the age of 18 must be with them at the time of surgery and upon discharge

## 2025-02-27 NOTE — TELEPHONE ENCOUNTER
Patient scheduled for a Cystoscopy, Bladder Biopsy with Fulguration with Dr Walker on 4/7/25. We are asking for clearance. Thanks

## 2025-02-28 DIAGNOSIS — R35.0 URINARY FREQUENCY: ICD-10-CM

## 2025-02-28 DIAGNOSIS — C67.8 MALIGNANT NEOPLASM OF OVERLAPPING SITES OF BLADDER (HCC): Primary | ICD-10-CM

## 2025-02-28 DIAGNOSIS — Z01.818 PRE-OP TESTING: ICD-10-CM

## 2025-03-06 ENCOUNTER — TELEPHONE (OUTPATIENT)
Dept: UROLOGY | Age: 80
End: 2025-03-06

## 2025-03-06 ENCOUNTER — HOSPITAL ENCOUNTER (OUTPATIENT)
Age: 80
Discharge: HOME | End: 2025-03-06
Payer: MEDICARE

## 2025-03-06 ENCOUNTER — HOSPITAL ENCOUNTER (OUTPATIENT)
Dept: GENERAL RADIOLOGY | Age: 80
Discharge: HOME | End: 2025-03-06
Payer: MEDICARE

## 2025-03-06 DIAGNOSIS — C67.8 MALIGNANT NEOPLASM OF OVERLAPPING SITES OF BLADDER (HCC): ICD-10-CM

## 2025-03-06 DIAGNOSIS — Z01.818 PRE-OP TESTING: ICD-10-CM

## 2025-03-06 LAB
BASOPHILS ABSOLUTE: 0.1 THOU/MM3 (ref 0–0.1)
BASOPHILS NFR BLD AUTO: 0.8 %
DEPRECATED RDW RBC AUTO: 41.5 FL (ref 35–45)
EKG ATRIAL RATE: 59 BPM
EKG P AXIS: 54 DEGREES
EKG P-R INTERVAL: 160 MS
EKG Q-T INTERVAL: 408 MS
EKG QRS DURATION: 118 MS
EKG QTC CALCULATION (BAZETT): 403 MS
EKG R AXIS: -36 DEGREES
EKG T AXIS: 36 DEGREES
EKG VENTRICULAR RATE: 59 BPM
EOSINOPHIL NFR BLD AUTO: 2.7 %
EOSINOPHILS ABSOLUTE: 0.2 THOU/MM3 (ref 0–0.4)
ERYTHROCYTE [DISTWIDTH] IN BLOOD BY AUTOMATED COUNT: 12.4 % (ref 11.5–14.5)
HCT VFR BLD AUTO: 40.2 % (ref 42–52)
HGB BLD-MCNC: 13.4 GM/DL (ref 14–18)
IMM GRANULOCYTES # BLD AUTO: 0.03 THOU/MM3 (ref 0–0.07)
IMM GRANULOCYTES NFR BLD AUTO: 0.4 %
LYMPHOCYTES ABSOLUTE: 1.5 THOU/MM3 (ref 1–4.8)
LYMPHOCYTES NFR BLD AUTO: 20.1 %
MCH RBC QN AUTO: 30.6 PG (ref 26–33)
MCHC RBC AUTO-ENTMCNC: 33.3 GM/DL (ref 32.2–35.5)
MCV RBC AUTO: 91.8 FL (ref 80–94)
MONOCYTES ABSOLUTE: 1 THOU/MM3 (ref 0.4–1.3)
MONOCYTES NFR BLD AUTO: 13.7 %
NEUTROPHILS ABSOLUTE: 4.6 THOU/MM3 (ref 1.8–7.7)
NEUTROPHILS NFR BLD AUTO: 62.3 %
NRBC BLD AUTO-RTO: 0 /100 WBC
PLATELET # BLD AUTO: 198 THOU/MM3 (ref 130–400)
PMV BLD AUTO: 10.6 FL (ref 9.4–12.4)
RBC # BLD AUTO: 4.38 MILL/MM3 (ref 4.7–6.1)
WBC # BLD AUTO: 7.4 THOU/MM3 (ref 4.8–10.8)

## 2025-03-06 PROCEDURE — 93005 ELECTROCARDIOGRAM TRACING: CPT

## 2025-03-06 PROCEDURE — 85025 COMPLETE CBC W/AUTO DIFF WBC: CPT

## 2025-03-06 PROCEDURE — 71046 X-RAY EXAM CHEST 2 VIEWS: CPT

## 2025-03-06 PROCEDURE — 36415 COLL VENOUS BLD VENIPUNCTURE: CPT

## 2025-03-11 ENCOUNTER — PREP FOR PROCEDURE (OUTPATIENT)
Dept: UROLOGY | Age: 80
End: 2025-03-11

## 2025-03-11 NOTE — PATIENT INSTRUCTIONS
You may receive a survey regarding the care you received during your visit. We encourage you to complete and return your survey, as your input is valuable to us. We hope you will choose us in the future for your healthcare needs. Thank you!    Your Medical Assistant today: ANDRÉS Eckert & SILVER Hernandez  Thank you for coming to our office! It was a pleasure to serve you.

## 2025-03-12 ENCOUNTER — OFFICE VISIT (OUTPATIENT)
Dept: CARDIOLOGY CLINIC | Age: 80
End: 2025-03-12
Payer: MEDICARE

## 2025-03-12 VITALS
WEIGHT: 220.8 LBS | SYSTOLIC BLOOD PRESSURE: 146 MMHG | DIASTOLIC BLOOD PRESSURE: 86 MMHG | HEART RATE: 65 BPM | BODY MASS INDEX: 29.95 KG/M2

## 2025-03-12 DIAGNOSIS — Z01.818 PRE-OP EXAM: Primary | ICD-10-CM

## 2025-03-12 PROCEDURE — 1159F MED LIST DOCD IN RCRD: CPT | Performed by: INTERNAL MEDICINE

## 2025-03-12 PROCEDURE — 1123F ACP DISCUSS/DSCN MKR DOCD: CPT | Performed by: INTERNAL MEDICINE

## 2025-03-12 PROCEDURE — 3079F DIAST BP 80-89 MM HG: CPT | Performed by: INTERNAL MEDICINE

## 2025-03-12 PROCEDURE — 3077F SYST BP >= 140 MM HG: CPT | Performed by: INTERNAL MEDICINE

## 2025-03-12 PROCEDURE — 99214 OFFICE O/P EST MOD 30 MIN: CPT | Performed by: INTERNAL MEDICINE

## 2025-03-12 PROCEDURE — G8417 CALC BMI ABV UP PARAM F/U: HCPCS | Performed by: INTERNAL MEDICINE

## 2025-03-12 PROCEDURE — 1036F TOBACCO NON-USER: CPT | Performed by: INTERNAL MEDICINE

## 2025-03-12 PROCEDURE — G8427 DOCREV CUR MEDS BY ELIG CLIN: HCPCS | Performed by: INTERNAL MEDICINE

## 2025-03-12 NOTE — PROGRESS NOTES
[facility] [place] [facility]  [facility] CARDIOLOGY  [address]  [address]  [facility] [address]  Dept: [phone]  Dept Fax: [phone]  Loc: [phone]    Visit Date: 3/12/2025  Mr. Monsivais is a 79 y.o. male who presented for:  Preoperative cardiac risk assessment   HPI:   Facundo Monsivais is a pleasant 79 y.o. male who  has a past medical history of Arthritis, CAD (coronary artery disease), Chronic obstructive pulmonary disease (HCC), GERD (gastroesophageal reflux disease), Hearing loss, Hyperglycemia, Hyperlipidemia, Hypertension, IBS (irritable bowel syndrome), Kidney stones, Malignant neoplasm of trigone of urinary bladder (HCC), Obstructive sleep apnea, and Prostatitis. He has h/o angina, CAD. LHC on 2/2023 revealed nonobstructive CAD. The patient is scheduled for urological surgery for bladder cancer, preoperative cardiac risk assessment was requested. Patient denies chest pain, shortness of breath, dyspnea on exertion.       Current Outpatient Medications:     atorvastatin (LIPITOR) 40 MG tablet, Take 1 tablet by mouth nightly, Disp: 90 tablet, Rfl: 3    omeprazole (PRILOSEC) 20 MG delayed release capsule, Take one tablet by mouth daily, Disp: 90 capsule, Rfl: 1    lisinopril (PRINIVIL;ZESTRIL) 10 MG tablet, Take 1 tablet by mouth daily, Disp: 90 tablet, Rfl: 2    metoprolol tartrate (LOPRESSOR) 25 MG tablet, Take 1 tablet by mouth daily, Disp: 90 tablet, Rfl: 2    polyethyl glycol-propyl glycol 0.4-0.3 % (SYSTANE) 0.4-0.3 % ophthalmic solution, Place 1 drop into both eyes daily as needed for Dry Eyes, Disp: 5 mL, Rfl: 2    dicyclomine (BENTYL) 20 MG tablet, Take 1 tablet by mouth 2 times daily, Disp: 180 tablet, Rfl: 2    nitroGLYCERIN (NITROSTAT) 0.4 MG SL tablet, up to max of 3 total doses. If no relief after 1 dose, call 911., Disp: 25 tablet, Rfl: 2    blood glucose test strips (GNP TRUE METRIX GLUCOSE STRIPS) strip, 1 each by In Vitro route daily As needed., Disp:

## 2025-03-14 RX ORDER — SODIUM CHLORIDE 0.9 % (FLUSH) 0.9 %
5-40 SYRINGE (ML) INJECTION PRN
Status: CANCELLED | OUTPATIENT
Start: 2025-03-14

## 2025-03-14 RX ORDER — SODIUM CHLORIDE 9 MG/ML
INJECTION, SOLUTION INTRAVENOUS PRN
Status: CANCELLED | OUTPATIENT
Start: 2025-03-14

## 2025-03-14 RX ORDER — SODIUM CHLORIDE 0.9 % (FLUSH) 0.9 %
5-40 SYRINGE (ML) INJECTION EVERY 12 HOURS SCHEDULED
Status: CANCELLED | OUTPATIENT
Start: 2025-03-14

## 2025-03-16 ENCOUNTER — PATIENT MESSAGE (OUTPATIENT)
Dept: FAMILY MEDICINE CLINIC | Age: 80
End: 2025-03-16

## 2025-03-17 ENCOUNTER — OFFICE VISIT (OUTPATIENT)
Dept: FAMILY MEDICINE CLINIC | Age: 80
End: 2025-03-17
Payer: MEDICARE

## 2025-03-17 VITALS
HEART RATE: 80 BPM | TEMPERATURE: 97.7 F | BODY MASS INDEX: 28.91 KG/M2 | WEIGHT: 213.4 LBS | SYSTOLIC BLOOD PRESSURE: 100 MMHG | HEIGHT: 72 IN | DIASTOLIC BLOOD PRESSURE: 68 MMHG

## 2025-03-17 DIAGNOSIS — J06.9 URI WITH COUGH AND CONGESTION: Primary | ICD-10-CM

## 2025-03-17 PROCEDURE — 1036F TOBACCO NON-USER: CPT | Performed by: NURSE PRACTITIONER

## 2025-03-17 PROCEDURE — 1160F RVW MEDS BY RX/DR IN RCRD: CPT | Performed by: NURSE PRACTITIONER

## 2025-03-17 PROCEDURE — 1159F MED LIST DOCD IN RCRD: CPT | Performed by: NURSE PRACTITIONER

## 2025-03-17 PROCEDURE — 1123F ACP DISCUSS/DSCN MKR DOCD: CPT | Performed by: NURSE PRACTITIONER

## 2025-03-17 PROCEDURE — G8417 CALC BMI ABV UP PARAM F/U: HCPCS | Performed by: NURSE PRACTITIONER

## 2025-03-17 PROCEDURE — G8427 DOCREV CUR MEDS BY ELIG CLIN: HCPCS | Performed by: NURSE PRACTITIONER

## 2025-03-17 PROCEDURE — 99214 OFFICE O/P EST MOD 30 MIN: CPT | Performed by: NURSE PRACTITIONER

## 2025-03-17 PROCEDURE — 3074F SYST BP LT 130 MM HG: CPT | Performed by: NURSE PRACTITIONER

## 2025-03-17 PROCEDURE — 3078F DIAST BP <80 MM HG: CPT | Performed by: NURSE PRACTITIONER

## 2025-03-17 RX ORDER — GUAIFENESIN 600 MG/1
600 TABLET, EXTENDED RELEASE ORAL 2 TIMES DAILY
Qty: 30 TABLET | Refills: 0 | Status: SHIPPED | OUTPATIENT
Start: 2025-03-17 | End: 2025-04-01

## 2025-03-17 RX ORDER — CEFDINIR 300 MG/1
300 CAPSULE ORAL 2 TIMES DAILY
Qty: 20 CAPSULE | Refills: 0 | Status: SHIPPED | OUTPATIENT
Start: 2025-03-17 | End: 2025-03-27

## 2025-03-17 ASSESSMENT — ENCOUNTER SYMPTOMS
COUGH: 1
VOMITING: 0
BACK PAIN: 0
RHINORRHEA: 0
EYE DISCHARGE: 0
CONSTIPATION: 0
DIARRHEA: 0
EYE PAIN: 0
EYE REDNESS: 0
ABDOMINAL PAIN: 0
SHORTNESS OF BREATH: 0
SORE THROAT: 0
WHEEZING: 1
ALLERGIC/IMMUNOLOGIC NEGATIVE: 1
TROUBLE SWALLOWING: 0
NAUSEA: 0

## 2025-03-17 NOTE — PROGRESS NOTES
[facility]  [facility]  [address] [address]  Dept: [phone]  Dept Fax: [phone]  Loc: [phone]     Visit Date:  3/17/2025      Patient:  Facundo Monsivais  YOB: 1945    HPI:     Chief Complaint   Patient presents with    Cough     Cough, chest congestion, chills. Sx since last week. Denies fever. Denies body aches.     History of Present Illness  The patient presents for evaluation of a persistent cough.    She has been experiencing the cough since the previous Thursday, with no signs of improvement or worsening. She reports a cessation of the cough but continues to experience mild chills. She is uncertain about any underlying COPD or lung disease. Additionally, she reports mild diarrhea. She does not report any facial pressure, discomfort under her eyes, or ear-related issues. Despite attempts to expectorate, she has been unsuccessful.    Note-patient is male, not female as indicated by HEIDI.    Cough  Associated symptoms include chills, myalgias and wheezing. Pertinent negatives include no ear pain, eye redness, fever, rash, rhinorrhea, sore throat or shortness of breath.       Medications    Current Outpatient Medications:     cefdinir (OMNICEF) 300 MG capsule, Take 1 capsule by mouth 2 times daily for 10 days, Disp: 20 capsule, Rfl: 0    guaiFENesin (MUCINEX) 600 MG extended release tablet, Take 1 tablet by mouth 2 times daily for 15 days, Disp: 30 tablet, Rfl: 0    atorvastatin (LIPITOR) 40 MG tablet, Take 1 tablet by mouth nightly, Disp: 90 tablet, Rfl: 3    omeprazole (PRILOSEC) 20 MG delayed release capsule, Take one tablet by mouth daily, Disp: 90 capsule, Rfl: 1    lisinopril (PRINIVIL;ZESTRIL) 10 MG tablet, Take 1 tablet by mouth daily, Disp: 90 tablet, Rfl: 2    metoprolol tartrate (LOPRESSOR) 25 MG tablet, Take 1 tablet by mouth daily, Disp: 90 tablet, Rfl: 2    polyethyl glycol-propyl glycol 0.4-0.3 %

## 2025-03-24 ENCOUNTER — HOSPITAL ENCOUNTER (OUTPATIENT)
Age: 80
Discharge: HOME | End: 2025-03-24
Payer: MEDICARE

## 2025-03-24 DIAGNOSIS — R35.0 URINARY FREQUENCY: ICD-10-CM

## 2025-03-24 DIAGNOSIS — C67.8 MALIGNANT NEOPLASM OF OVERLAPPING SITES OF BLADDER (HCC): ICD-10-CM

## 2025-03-24 DIAGNOSIS — Z01.818 PRE-OP TESTING: ICD-10-CM

## 2025-03-24 PROCEDURE — 87086 URINE CULTURE/COLONY COUNT: CPT

## 2025-03-26 ENCOUNTER — RESULTS FOLLOW-UP (OUTPATIENT)
Dept: UROLOGY | Age: 80
End: 2025-03-26

## 2025-03-26 LAB — BACTERIA UR CULT: NORMAL

## 2025-03-31 NOTE — FLOWSHEET NOTE
with you, we request that you limit to 2 visitors in pre-op area  Masks are recommended but not required, new masks at entrance desk  Call [phone] for any questions

## 2025-03-31 NOTE — PROGRESS NOTES
PAT call attempted, patient unavailable, left message to please call us back at your earliest convenience; [phone]

## 2025-04-07 ENCOUNTER — ANESTHESIA EVENT (OUTPATIENT)
Dept: OPERATING ROOM | Age: 80
End: 2025-04-07
Payer: MEDICARE

## 2025-04-07 ENCOUNTER — HOSPITAL ENCOUNTER (OUTPATIENT)
Age: 80
Setting detail: OUTPATIENT SURGERY
Discharge: HOME | End: 2025-04-07
Attending: UROLOGY | Admitting: UROLOGY
Payer: MEDICARE

## 2025-04-07 ENCOUNTER — ANESTHESIA (OUTPATIENT)
Dept: OPERATING ROOM | Age: 80
End: 2025-04-07
Payer: MEDICARE

## 2025-04-07 VITALS
BODY MASS INDEX: 29.28 KG/M2 | HEART RATE: 55 BPM | RESPIRATION RATE: 16 BRPM | OXYGEN SATURATION: 98 % | HEIGHT: 72 IN | SYSTOLIC BLOOD PRESSURE: 150 MMHG | DIASTOLIC BLOOD PRESSURE: 80 MMHG | TEMPERATURE: 97.5 F | WEIGHT: 216.2 LBS

## 2025-04-07 DIAGNOSIS — C67.8 MALIGNANT NEOPLASM OF OVERLAPPING SITES OF BLADDER (HCC): ICD-10-CM

## 2025-04-07 PROCEDURE — 2709999900 HC NON-CHARGEABLE SUPPLY: Performed by: UROLOGY

## 2025-04-07 PROCEDURE — 7100000000 HC PACU RECOVERY - FIRST 15 MIN: Performed by: UROLOGY

## 2025-04-07 PROCEDURE — 3600000013 HC SURGERY LEVEL 3 ADDTL 15MIN: Performed by: UROLOGY

## 2025-04-07 PROCEDURE — 3700000000 HC ANESTHESIA ATTENDED CARE: Performed by: UROLOGY

## 2025-04-07 PROCEDURE — 2580000003 HC RX 258: Performed by: UROLOGY

## 2025-04-07 PROCEDURE — 2720000010 HC SURG SUPPLY STERILE: Performed by: UROLOGY

## 2025-04-07 PROCEDURE — 6360000002 HC RX W HCPCS: Performed by: NURSE ANESTHETIST, CERTIFIED REGISTERED

## 2025-04-07 PROCEDURE — 6360000002 HC RX W HCPCS: Performed by: UROLOGY

## 2025-04-07 PROCEDURE — 3600000003 HC SURGERY LEVEL 3 BASE: Performed by: UROLOGY

## 2025-04-07 PROCEDURE — 2500000003 HC RX 250 WO HCPCS: Performed by: UROLOGY

## 2025-04-07 PROCEDURE — 88307 TISSUE EXAM BY PATHOLOGIST: CPT

## 2025-04-07 PROCEDURE — 7100000010 HC PHASE II RECOVERY - FIRST 15 MIN: Performed by: UROLOGY

## 2025-04-07 PROCEDURE — 3700000001 HC ADD 15 MINUTES (ANESTHESIA): Performed by: UROLOGY

## 2025-04-07 PROCEDURE — 7100000011 HC PHASE II RECOVERY - ADDTL 15 MIN: Performed by: UROLOGY

## 2025-04-07 PROCEDURE — 7100000001 HC PACU RECOVERY - ADDTL 15 MIN: Performed by: UROLOGY

## 2025-04-07 RX ORDER — SODIUM CHLORIDE 9 MG/ML
INJECTION, SOLUTION INTRAVENOUS PRN
Status: DISCONTINUED | OUTPATIENT
Start: 2025-04-07 | End: 2025-04-07 | Stop reason: HOSPADM

## 2025-04-07 RX ORDER — NALOXONE HYDROCHLORIDE 0.4 MG/ML
INJECTION, SOLUTION INTRAMUSCULAR; INTRAVENOUS; SUBCUTANEOUS PRN
Status: DISCONTINUED | OUTPATIENT
Start: 2025-04-07 | End: 2025-04-07 | Stop reason: HOSPADM

## 2025-04-07 RX ORDER — FENTANYL CITRATE 50 UG/ML
INJECTION, SOLUTION INTRAMUSCULAR; INTRAVENOUS
Status: DISCONTINUED | OUTPATIENT
Start: 2025-04-07 | End: 2025-04-07 | Stop reason: SDUPTHER

## 2025-04-07 RX ORDER — ONDANSETRON 2 MG/ML
4 INJECTION INTRAMUSCULAR; INTRAVENOUS
Status: DISCONTINUED | OUTPATIENT
Start: 2025-04-07 | End: 2025-04-07 | Stop reason: HOSPADM

## 2025-04-07 RX ORDER — SODIUM CHLORIDE 0.9 % (FLUSH) 0.9 %
5-40 SYRINGE (ML) INJECTION PRN
Status: DISCONTINUED | OUTPATIENT
Start: 2025-04-07 | End: 2025-04-07 | Stop reason: HOSPADM

## 2025-04-07 RX ORDER — FENTANYL CITRATE 50 UG/ML
50 INJECTION, SOLUTION INTRAMUSCULAR; INTRAVENOUS EVERY 5 MIN PRN
Status: DISCONTINUED | OUTPATIENT
Start: 2025-04-07 | End: 2025-04-07 | Stop reason: HOSPADM

## 2025-04-07 RX ORDER — OXYBUTYNIN CHLORIDE 5 MG/1
5 TABLET, EXTENDED RELEASE ORAL DAILY
Qty: 14 TABLET | Refills: 0 | Status: SHIPPED | OUTPATIENT
Start: 2025-04-07 | End: 2025-04-21

## 2025-04-07 RX ORDER — LIDOCAINE HYDROCHLORIDE 20 MG/ML
INJECTION, SOLUTION EPIDURAL; INFILTRATION; INTRACAUDAL; PERINEURAL
Status: DISCONTINUED | OUTPATIENT
Start: 2025-04-07 | End: 2025-04-07 | Stop reason: SDUPTHER

## 2025-04-07 RX ORDER — DEXAMETHASONE SODIUM PHOSPHATE 4 MG/ML
INJECTION, SOLUTION INTRA-ARTICULAR; INTRALESIONAL; INTRAMUSCULAR; INTRAVENOUS; SOFT TISSUE
Status: DISCONTINUED | OUTPATIENT
Start: 2025-04-07 | End: 2025-04-07 | Stop reason: SDUPTHER

## 2025-04-07 RX ORDER — ONDANSETRON 2 MG/ML
INJECTION INTRAMUSCULAR; INTRAVENOUS
Status: DISCONTINUED | OUTPATIENT
Start: 2025-04-07 | End: 2025-04-07 | Stop reason: SDUPTHER

## 2025-04-07 RX ORDER — SODIUM CHLORIDE 0.9 % (FLUSH) 0.9 %
5-40 SYRINGE (ML) INJECTION EVERY 12 HOURS SCHEDULED
Status: DISCONTINUED | OUTPATIENT
Start: 2025-04-07 | End: 2025-04-07 | Stop reason: HOSPADM

## 2025-04-07 RX ORDER — DIPHENHYDRAMINE HYDROCHLORIDE 50 MG/ML
12.5 INJECTION, SOLUTION INTRAMUSCULAR; INTRAVENOUS
Status: DISCONTINUED | OUTPATIENT
Start: 2025-04-07 | End: 2025-04-07 | Stop reason: HOSPADM

## 2025-04-07 RX ORDER — DOXYCYCLINE HYCLATE 100 MG
100 TABLET ORAL 2 TIMES DAILY
Qty: 6 TABLET | Refills: 0 | Status: SHIPPED | OUTPATIENT
Start: 2025-04-07 | End: 2025-04-10

## 2025-04-07 RX ORDER — PHENAZOPYRIDINE HYDROCHLORIDE 100 MG/1
100 TABLET, FILM COATED ORAL 3 TIMES DAILY PRN
Qty: 15 TABLET | Refills: 0 | Status: SHIPPED | OUTPATIENT
Start: 2025-04-07 | End: 2025-04-12

## 2025-04-07 RX ADMIN — FENTANYL CITRATE 100 MCG: 50 INJECTION, SOLUTION INTRAMUSCULAR; INTRAVENOUS at 14:07

## 2025-04-07 RX ADMIN — SODIUM CHLORIDE: 0.9 INJECTION, SOLUTION INTRAVENOUS at 12:30

## 2025-04-07 RX ADMIN — LIDOCAINE HYDROCHLORIDE 100 MG: 20 INJECTION, SOLUTION EPIDURAL; INFILTRATION; INTRACAUDAL; PERINEURAL at 13:47

## 2025-04-07 RX ADMIN — ONDANSETRON 4 MG: 2 INJECTION INTRAMUSCULAR; INTRAVENOUS at 13:51

## 2025-04-07 RX ADMIN — WATER 2000 MG: 1 INJECTION INTRAMUSCULAR; INTRAVENOUS; SUBCUTANEOUS at 13:40

## 2025-04-07 RX ADMIN — DEXAMETHASONE SODIUM PHOSPHATE 4 MG: 4 INJECTION, SOLUTION INTRAMUSCULAR; INTRAVENOUS at 13:51

## 2025-04-07 RX ADMIN — PROPOFOL 150 MG: 10 INJECTION, EMULSION INTRAVENOUS at 13:47

## 2025-04-07 ASSESSMENT — PAIN SCALES - GENERAL
PAINLEVEL_OUTOF10: 0
PAINLEVEL_OUTOF10: 0

## 2025-04-07 ASSESSMENT — PAIN - FUNCTIONAL ASSESSMENT: PAIN_FUNCTIONAL_ASSESSMENT: 0-10

## 2025-04-07 NOTE — PROGRESS NOTES
Pt returned to SDS room 6. Vitals and assessment as charted. 0.9 infusing, @400ml to count from PACU. Pt has snack and drink. Family at the bedside. Pt and family verbalized understanding of discharge criteria and call light use. Call light in reach.

## 2025-04-07 NOTE — H&P
Aaron Li  Urology H&P Note     Patient:  Facundo Monsivais  MRN: [id number]  YOB: 1945    ATTENDING: Reynold Walker Jr, MD     CHIEF COMPLAINT:  Unusual bladder symptoms    HISTORY OF PRESENT ILLNESS:   The patient is a 79 y.o. male who presents with unusual bladder symptoms    Patient's old records, notes and chart reviewed and summarized above.     Past Medical History:    Past Medical History:   Diagnosis Date    Arthritis     CAD (coronary artery disease)     Chronic obstructive pulmonary disease (HCC) 09/17/2019    GERD (gastroesophageal reflux disease)     Hearing loss 2019    bilateral ears    Hyperglycemia 03/18/2016    Hyperlipidemia     Hypertension     IBS (irritable bowel syndrome)     Kidney stones     Malignant neoplasm of trigone of urinary bladder (HCC)     Obstructive sleep apnea     wears cpap    Prostatitis        Past Surgical History:    Past Surgical History:   Procedure Laterality Date    ABDOMEN SURGERY      BACK SURGERY  07/08/2013    Lumbar Coyle L2-S-1, revision, PLIF L5-S-1 w/ grafting    CARDIAC CATHETERIZATION  09/2007 2007 & 2023    COLON SURGERY  2008    Dr. Cullen--[facility]    COLONOSCOPY  ???2020??    Dr. Hamlin    CORONARY ANGIOPLASTY WITH STENT PLACEMENT  2007    CYSTOSCOPY N/A 02/28/2020    CYSTOSCOPY TRANSURETHRAL RESECTION BLADDER TUMOR performed by Juan Miguel Yang MD at [facility] OR    CYSTOSCOPY N/A 10/04/2021    CYSTOSCOPY, BLADDER BIOPSY WITH FULGURATION performed by Reynold Walker Jr., MD at [facility] OR    CYSTOSCOPY N/A 1/29/2024    TURBT WITH INSTILLATION OF GEMCITABINE performed by Reynold Walker Jr., MD at [facility] OR    DILATATION, ESOPHAGUS      ENDOSCOPY, COLON, DIAGNOSTIC      EYE SURGERY  2021    bilateral cataracts    HERNIA REPAIR  2005    Dr. Cullen--[facility]    HIP ARTHROPLASTY Right 03/25/2014    right total hip    JOINT REPLACEMENT      right hip  2014    OTHER SURGICAL HISTORY  08/25/2016    TURBT by Dr Simón NUR N/A 08/26/2022    CYSTO

## 2025-04-07 NOTE — PROGRESS NOTES
1417 non reactive with spontaneous resp but obstructing , chin lift assist given .  1420 oral airway placed for obstructing    1432 awake and looking around oral airway removed denies any pain and drifts back to sleep   1455 awakens to name denies any pain   1458 meets criteria for discharge , transported to [facility]

## 2025-04-07 NOTE — DISCHARGE INSTRUCTIONS
Cystoscopy bladder biopsy with fulguration.  You may see blood in the urine after the procedure.  This should resolve over the next couple days.  Please stay hydrated.  You may see intermittent blood in the urine while the stent is in place.  This is expected.     Pt ok to discharge home in good condition  No heavy lifting, >10 lbs for today  Pt should avoid strenuous activity for today  Pt should walk moderately at home  Pt ok to shower   Pt may resume diet as tolerated  Please call attending physician or hospital  with questions  Call or Present to ED if fever (> 101F), intractable nausea vomiting or pain.    If taking, Please hold blood thinning medications for 3-5 days till hematuria improves.      Pt should follow up with Dr. Walker, in 1-2 weeks for pathology results, call to confirm appointment

## 2025-04-07 NOTE — PROGRESS NOTES
Patient oriented to Same Day department and admitted to Same Day Surgery room 6.   Patient verbalized approval for first name, last initial with physician name on unit whiteboard.     Plan of care reviewed with patient.   Patient room whiteboard filled out and discussed with patient and responsible adult.   Patient and responsible adult offered Same Day Welcome Packet to review.    Call light in reach.   Bed in lowest position, locked, with one bed rail up.   SCDs and warming blanket in place.  Appropriate arm bands on patient.   Bathroom offered.   All questions and concerns of patient addressed.        Meds to Beds:   Patient informed of [facility] Meds to Beds program during admission. Patient has declined use of program.

## 2025-04-07 NOTE — ANESTHESIA POSTPROCEDURE EVALUATION
Department of Anesthesiology  Postprocedure Note    Patient: Facundo Monsivais  MRN: [id number]  YOB: 1945  Date of evaluation: 4/7/2025    Procedure Summary       Date: 04/07/25 Room / Location: [facility] OR 03 / [facility] OR    Anesthesia Start: 1340 Anesthesia Stop: 1419    Procedure: CYSTOSCOPY BLADDER BIOPSY WIITH FULGURATION (Bladder) Diagnosis:       Malignant neoplasm of overlapping sites of bladder (HCC)      (Malignant neoplasm of overlapping sites of bladder (HCC) [C67.8])    Surgeons: Reynold Walker Jr., MD Responsible Provider: Donato Gaitan DO    Anesthesia Type: general ASA Status: 3            Anesthesia Type: No value filed.    Jayleen Phase I: Jayleen Score: 10    Jayleen Phase II: Jayleen Score: 10    Anesthesia Post Evaluation    Patient location during evaluation: PACU  Patient participation: complete - patient participated  Level of consciousness: awake and alert  Airway patency: patent  Nausea & Vomiting: no nausea  Cardiovascular status: blood pressure returned to baseline and hemodynamically stable  Respiratory status: acceptable and spontaneous ventilation  Hydration status: euvolemic  Pain management: adequate    No notable events documented.

## 2025-04-14 ENCOUNTER — OFFICE VISIT (OUTPATIENT)
Dept: UROLOGY | Age: 80
End: 2025-04-14
Payer: MEDICARE

## 2025-04-14 VITALS — RESPIRATION RATE: 16 BRPM | BODY MASS INDEX: 29.55 KG/M2 | WEIGHT: 218.2 LBS | HEIGHT: 72 IN

## 2025-04-14 DIAGNOSIS — C67.8 MALIGNANT NEOPLASM OF OVERLAPPING SITES OF BLADDER (HCC): Primary | ICD-10-CM

## 2025-04-14 DIAGNOSIS — N13.8 BPH WITH OBSTRUCTION/LOWER URINARY TRACT SYMPTOMS: ICD-10-CM

## 2025-04-14 DIAGNOSIS — N40.1 BPH WITH OBSTRUCTION/LOWER URINARY TRACT SYMPTOMS: ICD-10-CM

## 2025-04-14 LAB
BACTERIA URNS QL MICRO: ABNORMAL /HPF
BILIRUB UR QL STRIP.AUTO: NEGATIVE
CASTS #/AREA URNS LPF: ABNORMAL /LPF
CASTS 2: ABNORMAL /LPF
CHARACTER UR: CLEAR
COLOR, UA: YELLOW
CRYSTALS URNS MICRO: ABNORMAL
EPITHELIAL CELLS, UA: ABNORMAL /HPF
GLUCOSE UR QL STRIP.AUTO: NEGATIVE MG/DL
HGB UR QL STRIP.AUTO: ABNORMAL
KETONES UR QL STRIP.AUTO: NEGATIVE
MISCELLANEOUS 2: ABNORMAL
NITRITE UR QL STRIP: NEGATIVE
PH UR STRIP.AUTO: 6 [PH] (ref 5–9)
PROT UR STRIP.AUTO-MCNC: NEGATIVE MG/DL
RBC URINE: ABNORMAL /HPF
RENAL EPI CELLS #/AREA URNS HPF: ABNORMAL /[HPF]
SP GR UR REFRACT.AUTO: 1.02 (ref 1–1.03)
UROBILINOGEN, URINE: 0.2 EU/DL (ref 0–1)
WBC #/AREA URNS HPF: ABNORMAL /HPF
WBC #/AREA URNS HPF: NEGATIVE /[HPF]
YEAST LIKE FUNGI URNS QL MICRO: ABNORMAL

## 2025-04-14 PROCEDURE — 1159F MED LIST DOCD IN RCRD: CPT | Performed by: UROLOGY

## 2025-04-14 PROCEDURE — 99214 OFFICE O/P EST MOD 30 MIN: CPT | Performed by: UROLOGY

## 2025-04-14 PROCEDURE — G8417 CALC BMI ABV UP PARAM F/U: HCPCS | Performed by: UROLOGY

## 2025-04-14 PROCEDURE — G8427 DOCREV CUR MEDS BY ELIG CLIN: HCPCS | Performed by: UROLOGY

## 2025-04-14 PROCEDURE — 1036F TOBACCO NON-USER: CPT | Performed by: UROLOGY

## 2025-04-14 PROCEDURE — 1123F ACP DISCUSS/DSCN MKR DOCD: CPT | Performed by: UROLOGY

## 2025-04-14 RX ORDER — OXYBUTYNIN CHLORIDE 5 MG/1
5 TABLET, EXTENDED RELEASE ORAL DAILY
Qty: 14 TABLET | Refills: 0 | Status: SHIPPED | OUTPATIENT
Start: 2025-04-14

## 2025-04-14 RX ORDER — PHENAZOPYRIDINE HYDROCHLORIDE 100 MG/1
100 TABLET, FILM COATED ORAL 3 TIMES DAILY PRN
Qty: 15 TABLET | Refills: 0 | Status: SHIPPED | OUTPATIENT
Start: 2025-04-14 | End: 2025-04-19

## 2025-04-14 RX ORDER — DOXYCYCLINE HYCLATE 100 MG
100 TABLET ORAL 2 TIMES DAILY
Qty: 6 TABLET | Refills: 0 | Status: SHIPPED | OUTPATIENT
Start: 2025-04-14 | End: 2025-04-17

## 2025-04-14 NOTE — PROGRESS NOTES
Dr. Reynold Walker Jr, MD MD  [facility] Consultation / New Patient Visit    Patient:  Facundo Monsivais  YOB: 1945  Date: 4/14/2025  Consult requested from Shaheed Mcmahon MD     HISTORY OF PRESENT ILLNESS:   The patient is a 79 y.o. male who presents today for follow-up for the following problem(s): history of bladder cancer, BPH with LUTs  Overall the problem(s) : are worsening.  Associated Symptoms: No dysuria, gross hematuria.   Pain Severity:      Today visit:   4/14/25   S/p Greenlight PVP (8/26/22) for BPH (large gland) , post op urinary retention.  Still taking Flomax (0.8 mg).    TURBT - 4/2025 & 1/2024 - [facility] UCC pTa.   - possible recurrence on last cysto.  CX bladder elevated    Summary of old records:   (Patient's old records, notes and chart reviewed and summarized above.)    Sp Cysto bladder biopsy  Bladder, biopsy:             Benign urothelial mucosa with von Brunn's nests.             Negative for malignancy.         Cystoscopy Operative Note  Surgeon: Reynold Walker Jr, MD   Anesthesia: Urethral 2%  Indications: bladder cancer  Position: supine  Findings:   The patient was prepped and draped in the usual sterile fashion.  The flexible cystoscope was advanced through the urethra and into the bladder.  The bladder was thoroughly inspected and the following was noted:    Summary of old records:   (Patient's old records, notes and chart reviewed and summarized above.)    Residual Urine:  Significant  Urethra: No abnormalities of the urethra are noted.  Prostate: Large gland Complete obstruction by lateral & small median lobe of prostate.  Bladder: No tumors or CIS noted.  No bladder diverticulum. Severe trabeculation noted.  Ureters: Clear efflux from both ureters.  Orifices with normal configuration and location.  The cystoscope was removed.  The patient tolerated the procedure well.  Plan  Cysot bladder biopsy with fulguration  BPH - Greenlight

## 2025-05-06 RX ORDER — BLOOD-GLUCOSE CONTROL, LOW
EACH MISCELLANEOUS
Qty: 1 EACH | Refills: 3 | Status: SHIPPED | OUTPATIENT
Start: 2025-05-06

## 2025-05-09 ENCOUNTER — PATIENT MESSAGE (OUTPATIENT)
Dept: FAMILY MEDICINE CLINIC | Age: 80
End: 2025-05-09

## 2025-05-09 DIAGNOSIS — B35.3 TINEA PEDIS OF LEFT FOOT: Primary | ICD-10-CM

## 2025-05-09 DIAGNOSIS — S90.31XA CONTUSION OF RIGHT FOOT, INITIAL ENCOUNTER: ICD-10-CM

## 2025-06-10 RX ORDER — NITROGLYCERIN 0.4 MG/1
TABLET SUBLINGUAL
Qty: 25 TABLET | Refills: 2 | Status: SHIPPED | OUTPATIENT
Start: 2025-06-10

## 2025-07-08 ENCOUNTER — RESULTS FOLLOW-UP (OUTPATIENT)
Dept: FAMILY MEDICINE CLINIC | Age: 80
End: 2025-07-08

## 2025-07-08 LAB
ALBUMIN: 4.3 G/DL (ref 3.5–5.2)
ALK PHOSPHATASE: 83 U/L (ref 39–118)
ALT SERPL-CCNC: 16 U/L (ref 5–41)
ANION GAP SERPL CALCULATED.3IONS-SCNC: 12 MMOL/L (ref 7–16)
AST SERPL-CCNC: 19 U/L (ref 9–50)
BILIRUB SERPL-MCNC: 1.5 MG/DL
BUN BLDV-MCNC: 24 MG/DL (ref 8–23)
CALCIUM SERPL-MCNC: 8.9 MG/DL (ref 8.6–10.5)
CHLORIDE BLD-SCNC: 106 MMOL/L (ref 96–107)
CHOLESTEROL, TOTAL: 141 MG/DL (ref 100–199)
CHOLESTEROL/HDL RATIO: 2.5 (ref 2–4.5)
CO2: 24 MMOL/L (ref 18–32)
CREAT SERPL-MCNC: 1.1 MG/DL (ref 0.67–1.3)
EGFR IF NONAFRICAN AMERICAN: 68 ML/MIN/1.73M2
GLUCOSE: 129 MG/DL (ref 70–100)
HBA1C MFR BLD: 6.6 %
HDLC SERPL-MCNC: 56 MG/DL
LDL CHOLESTEROL: 72 MG/DL
LDL/HDL RATIO: 1.3
POTASSIUM SERPL-SCNC: 4.5 MMOL/L (ref 3.5–5.4)
SODIUM BLD-SCNC: 142 MMOL/L (ref 135–148)
TOTAL PROTEIN: 6.8 G/DL (ref 6–8.3)
TRIGL SERPL-MCNC: 67 MG/DL (ref 20–149)
VLDLC SERPL CALC-MCNC: 13 MG/DL

## 2025-07-14 ENCOUNTER — OFFICE VISIT (OUTPATIENT)
Dept: FAMILY MEDICINE CLINIC | Age: 80
End: 2025-07-14
Payer: MEDICARE

## 2025-07-14 VITALS
HEIGHT: 72 IN | BODY MASS INDEX: 29.99 KG/M2 | HEART RATE: 55 BPM | OXYGEN SATURATION: 95 % | WEIGHT: 221.4 LBS | DIASTOLIC BLOOD PRESSURE: 62 MMHG | RESPIRATION RATE: 18 BRPM | SYSTOLIC BLOOD PRESSURE: 100 MMHG

## 2025-07-14 DIAGNOSIS — Z11.3 SCREENING FOR STDS (SEXUALLY TRANSMITTED DISEASES): ICD-10-CM

## 2025-07-14 DIAGNOSIS — I10 ESSENTIAL HYPERTENSION, BENIGN: ICD-10-CM

## 2025-07-14 DIAGNOSIS — Z00.00 MEDICARE ANNUAL WELLNESS VISIT, SUBSEQUENT: Primary | ICD-10-CM

## 2025-07-14 DIAGNOSIS — Z72.89 OTHER PROBLEMS RELATED TO LIFESTYLE: ICD-10-CM

## 2025-07-14 DIAGNOSIS — E78.5 HYPERLIPIDEMIA, UNSPECIFIED HYPERLIPIDEMIA TYPE: ICD-10-CM

## 2025-07-14 PROCEDURE — 1159F MED LIST DOCD IN RCRD: CPT | Performed by: FAMILY MEDICINE

## 2025-07-14 PROCEDURE — 3078F DIAST BP <80 MM HG: CPT | Performed by: FAMILY MEDICINE

## 2025-07-14 PROCEDURE — 3074F SYST BP LT 130 MM HG: CPT | Performed by: FAMILY MEDICINE

## 2025-07-14 PROCEDURE — 1160F RVW MEDS BY RX/DR IN RCRD: CPT | Performed by: FAMILY MEDICINE

## 2025-07-14 PROCEDURE — 1123F ACP DISCUSS/DSCN MKR DOCD: CPT | Performed by: FAMILY MEDICINE

## 2025-07-14 PROCEDURE — G0439 PPPS, SUBSEQ VISIT: HCPCS | Performed by: FAMILY MEDICINE

## 2025-07-14 ASSESSMENT — LIFESTYLE VARIABLES
HOW OFTEN DO YOU HAVE A DRINK CONTAINING ALCOHOL: MONTHLY OR LESS
HOW MANY STANDARD DRINKS CONTAINING ALCOHOL DO YOU HAVE ON A TYPICAL DAY: 1 OR 2

## 2025-07-14 ASSESSMENT — PATIENT HEALTH QUESTIONNAIRE - PHQ9
SUM OF ALL RESPONSES TO PHQ QUESTIONS 1-9: 0
SUM OF ALL RESPONSES TO PHQ QUESTIONS 1-9: 0
2. FEELING DOWN, DEPRESSED OR HOPELESS: NOT AT ALL
1. LITTLE INTEREST OR PLEASURE IN DOING THINGS: NOT AT ALL
SUM OF ALL RESPONSES TO PHQ QUESTIONS 1-9: 0
SUM OF ALL RESPONSES TO PHQ QUESTIONS 1-9: 0

## 2025-07-14 NOTE — PROGRESS NOTES
Medicare Annual Wellness Visit    Facundo Monsivais is here for Medicare AWV (Marked yes to std testing) and Other (Patient would like a new handicap Rx. )    Assessment & Plan   Medicare annual wellness visit, subsequent  Essential hypertension, benign  Hyperlipidemia, unspecified hyperlipidemia type       No follow-ups on file.     Subjective   The following acute and/or chronic problems were also addressed today:   Diagnosis Orders   1. Medicare annual wellness visit, subsequent        2. Essential hypertension, benign        3. Hyperlipidemia, unspecified hyperlipidemia type          History of Present Illness  The patient is an 79-year-old male who presents for a routine checkup.    He has reviewed his lab results and noticed a slight elevation in his blood sugar levels, which he understands to be a normal occurrence with aging. He reports no other health concerns at this time. He is able to manage his daily activities independently without any issues.    He experiences occasional constipation, for which he takes medication that sometimes results in diarrhea. He reports no urinary problems.            Patient's complete Health Risk Assessment and screening values have been reviewed and are found in Flowsheets. The following problems were reviewed today and where indicated follow up appointments were made and/or referrals ordered.    Positive Risk Factor Screenings with Interventions:              Inactivity:  On average, how many days per week do you engage in moderate to strenuous exercise (like a brisk walk)?: 1 day (!) Abnormal  On average, how many minutes do you engage in exercise at this level?: 10 min    Interventions:  See A/P for plan and any pertinent orders     Abnormal BMI (obese):  Body mass index is 30.03 kg/m². (!) Abnormal    Interventions:  See A/P for plan and any pertinent orders          Vision Screen:  Do you have difficulty driving, watching TV, or doing any of your daily activities

## 2025-07-14 NOTE — PATIENT INSTRUCTIONS
Learning About Being Active as an Older Adult  Why is being active important as you get older?     Being active is one of the best things you can do for your health. And it's never too late to start. Being active--or getting active, if you aren't already--has definite benefits. It can:  Give you more energy,  Keep your mind sharp.  Improve balance to reduce your risk of falls.  Help you manage chronic illness with fewer medicines.  No matter how old you are, how fit you are, or what health problems you have, there is a form of activity that will work for you. And the more physical activity you can do, the better your overall health will be.  What kinds of activity can help you stay healthy?  Being more active will make your daily activities easier. Physical activity includes planned exercise and things you do in daily life. There are four types of activity:  Aerobic.  Doing aerobic activity makes your heart and lungs strong.  Includes walking, dancing, and gardening.  Aim for at least 2½ hours spread throughout the week.  It improves your energy and can help you sleep better.  Muscle-strengthening.  This type of activity can help maintain muscle and strengthen bones.  Includes climbing stairs, using resistance bands, and lifting or carrying heavy loads.  Aim for at least twice a week.  It can help protect the knees and other joints.  Stretching.  Stretching gives you better range of motion in joints and muscles.  Includes upper arm stretches, calf stretches, and gentle yoga.  Aim for at least twice a week, preferably after your muscles are warmed up from other activities.  It can help you function better in daily life.  Balancing.  This helps you stay coordinated and have good posture.  Includes heel-to-toe walking, ayana chi, and certain types of yoga.  Aim for at least 3 days a week.  It can reduce your risk of falling.  Even if you have a hard time meeting the recommendations, it's better to be more active

## 2025-07-22 DIAGNOSIS — K21.9 GASTROESOPHAGEAL REFLUX DISEASE, UNSPECIFIED WHETHER ESOPHAGITIS PRESENT: Primary | ICD-10-CM

## 2025-07-22 RX ORDER — OMEPRAZOLE 20 MG/1
CAPSULE, DELAYED RELEASE ORAL
Qty: 90 CAPSULE | Refills: 1 | Status: SHIPPED | OUTPATIENT
Start: 2025-07-22

## 2025-07-22 RX ORDER — DICYCLOMINE HCL 20 MG
20 TABLET ORAL 2 TIMES DAILY
Qty: 180 TABLET | Refills: 2 | Status: SHIPPED | OUTPATIENT
Start: 2025-07-22

## 2025-09-02 RX ORDER — METOPROLOL TARTRATE 25 MG/1
25 TABLET, FILM COATED ORAL DAILY
Qty: 90 TABLET | Refills: 2 | Status: SHIPPED | OUTPATIENT
Start: 2025-09-02

## 2025-09-02 RX ORDER — LISINOPRIL 10 MG/1
10 TABLET ORAL DAILY
Qty: 90 TABLET | Refills: 2 | Status: SHIPPED | OUTPATIENT
Start: 2025-09-02

## (undated) DEVICE — CYSTO PACK: Brand: MEDLINE INDUSTRIES, INC.

## (undated) DEVICE — SYRINGE MED 50ML LUERLOCK TIP

## (undated) DEVICE — DALE FOLEY CATHETER HOLDER, LEGBAND, FITS UP TO 30": Brand: DALE FOLEY CATHETER HOLDER

## (undated) DEVICE — DRAINBAG,ANTI-REFLUX TOWER,L/F,2000ML,LL: Brand: MEDLINE

## (undated) DEVICE — CYSTO: Brand: MEDLINE INDUSTRIES, INC.

## (undated) DEVICE — PATIENT RETURN ELECTRODE, SINGLE-USE, CONTACT QUALITY MONITORING, ADULT, WITH 9FT CORD, FOR PATIENTS WEIGING OVER 33LBS. (15KG): Brand: MEGADYNE

## (undated) DEVICE — SOLUTION IRRIG 3000ML 0.9% SOD CHL USP UROMATIC PLAS CONT

## (undated) DEVICE — SYRINGE CATH TIP 50ML

## (undated) DEVICE — YANKAUER,BULB TIP,W/O VENT,RIGID,STERILE: Brand: MEDLINE

## (undated) DEVICE — SOLUTION IRRIG 1000ML STRL H2O USP PLAS POUR BTL

## (undated) DEVICE — LASER SURG W22XH58IN D36IN 475LB SLD STATE FREQ DOUBLED

## (undated) DEVICE — HF-RESECTION ELECTRODE PLASMALOOP LOOP, MEDIUM, 24 FR., 12°-30°, ESG TURIS: Brand: OLYMPUS

## (undated) DEVICE — GLOVE ORANGE PI 7   MSG9070

## (undated) DEVICE — CATHETER URETH 22FR 30CC BLLN F 3 W SPEC M RND TIP TWO

## (undated) DEVICE — SOLUTION IV IRRIG WATER 1000ML POUR BRL 2F7114

## (undated) DEVICE — GOWN,SIRUS,NON REINFRCD,LARGE,SET IN SL: Brand: MEDLINE

## (undated) DEVICE — 1000ML,PRESSURE INFUSER W/STOPCOCK: Brand: MEDLINE

## (undated) DEVICE — TUBING, SUCTION, 1/4" X 20', STRAIGHT: Brand: MEDLINE INDUSTRIES, INC.

## (undated) DEVICE — SOLUTION IRRIG 1000ML 0.9% SOD CHL USP POUR PLAS BTL

## (undated) DEVICE — SOLUTION IRRIG 2000ML STRL H2O UROMATIC PLAS CONT USP

## (undated) DEVICE — EVACUATOR URO BLDR W/ ADPT UROVAC

## (undated) DEVICE — SOLUTION,WATER,IRRIGATION,250ML,STRL: Brand: MEDLINE

## (undated) DEVICE — BAG,LEG,COMFORT-STRAPS,LARGE,32OZ: Brand: MEDLINE

## (undated) DEVICE — SYRINGE, LUER LOCK, 60ML: Brand: MEDLINE

## (undated) DEVICE — SHEET,DRAPE,3/4,53X77,STERILE: Brand: MEDLINE

## (undated) DEVICE — CATHETER URETH 20FR BLLN 30CC F 3 W SPEC M RND TIP 2

## (undated) DEVICE — CATHETER,FOLEY,SILI-ELAST,LTX,16FR,10ML: Brand: MEDLINE

## (undated) DEVICE — BAG DRNGE (SEE COMMENT) UROLOGY TBL 15.5X31 IN W/HOSE

## (undated) DEVICE — Z INACTIVE USE 2660664 SOLUTION IRRIG 3000ML 0.9% SOD CHL USP UROMATIC PLAS CONT